# Patient Record
Sex: MALE | Race: BLACK OR AFRICAN AMERICAN | Employment: FULL TIME | URBAN - METROPOLITAN AREA
[De-identification: names, ages, dates, MRNs, and addresses within clinical notes are randomized per-mention and may not be internally consistent; named-entity substitution may affect disease eponyms.]

---

## 2017-01-09 ENCOUNTER — TELEPHONE (OUTPATIENT)
Dept: PULMONOLOGY | Age: 52
End: 2017-01-09

## 2017-01-10 ENCOUNTER — TELEPHONE (OUTPATIENT)
Dept: PULMONOLOGY | Age: 52
End: 2017-01-10

## 2017-01-17 ENCOUNTER — TELEPHONE (OUTPATIENT)
Dept: FAMILY MEDICINE CLINIC | Age: 52
End: 2017-01-17

## 2017-01-17 RX ORDER — PHENTERMINE HYDROCHLORIDE 37.5 MG/1
37.5 TABLET ORAL
Qty: 30 TABLET | Refills: 0 | Status: SHIPPED | OUTPATIENT
Start: 2017-01-17 | End: 2017-02-15 | Stop reason: SDUPTHER

## 2017-02-15 ENCOUNTER — OFFICE VISIT (OUTPATIENT)
Dept: FAMILY MEDICINE CLINIC | Age: 52
End: 2017-02-15

## 2017-02-15 VITALS
WEIGHT: 255.4 LBS | DIASTOLIC BLOOD PRESSURE: 84 MMHG | HEIGHT: 73 IN | BODY MASS INDEX: 33.85 KG/M2 | SYSTOLIC BLOOD PRESSURE: 126 MMHG | HEART RATE: 74 BPM | RESPIRATION RATE: 16 BRPM | OXYGEN SATURATION: 97 %

## 2017-02-15 DIAGNOSIS — I10 ESSENTIAL HYPERTENSION: Primary | ICD-10-CM

## 2017-02-15 DIAGNOSIS — E66.9 OBESITY (BMI 30.0-34.9): ICD-10-CM

## 2017-02-15 PROCEDURE — 99213 OFFICE O/P EST LOW 20 MIN: CPT | Performed by: FAMILY MEDICINE

## 2017-02-15 RX ORDER — PHENTERMINE HYDROCHLORIDE 37.5 MG/1
37.5 TABLET ORAL
Qty: 30 TABLET | Refills: 0 | Status: SHIPPED | OUTPATIENT
Start: 2017-02-15 | End: 2017-03-23 | Stop reason: SDUPTHER

## 2017-02-28 RX ORDER — ESCITALOPRAM OXALATE 10 MG/1
TABLET ORAL
Qty: 135 TABLET | Refills: 0 | OUTPATIENT
Start: 2017-02-28

## 2017-02-28 RX ORDER — ESCITALOPRAM OXALATE 10 MG/1
TABLET ORAL
Qty: 45 TABLET | Refills: 0 | Status: SHIPPED | OUTPATIENT
Start: 2017-02-28 | End: 2017-04-04 | Stop reason: SDUPTHER

## 2017-03-09 RX ORDER — TESTOSTERONE CYPIONATE 200 MG/ML
INJECTION INTRAMUSCULAR
Qty: 10 ML | Refills: 0 | Status: SHIPPED | OUTPATIENT
Start: 2017-03-09 | End: 2017-08-14 | Stop reason: SDUPTHER

## 2017-03-23 ENCOUNTER — OFFICE VISIT (OUTPATIENT)
Dept: FAMILY MEDICINE CLINIC | Age: 52
End: 2017-03-23

## 2017-03-23 VITALS
SYSTOLIC BLOOD PRESSURE: 130 MMHG | WEIGHT: 252 LBS | DIASTOLIC BLOOD PRESSURE: 78 MMHG | HEART RATE: 86 BPM | HEIGHT: 73 IN | BODY MASS INDEX: 33.4 KG/M2 | RESPIRATION RATE: 18 BRPM | OXYGEN SATURATION: 98 %

## 2017-03-23 DIAGNOSIS — M79.662 BILATERAL CALF PAIN: Primary | ICD-10-CM

## 2017-03-23 DIAGNOSIS — E66.9 OBESITY (BMI 30-39.9): ICD-10-CM

## 2017-03-23 DIAGNOSIS — M79.672 LEFT FOOT PAIN: ICD-10-CM

## 2017-03-23 DIAGNOSIS — M79.661 BILATERAL CALF PAIN: Primary | ICD-10-CM

## 2017-03-23 PROCEDURE — 99213 OFFICE O/P EST LOW 20 MIN: CPT | Performed by: NURSE PRACTITIONER

## 2017-03-23 RX ORDER — PHENTERMINE HYDROCHLORIDE 37.5 MG/1
37.5 TABLET ORAL
Qty: 30 TABLET | Refills: 0 | Status: SHIPPED | OUTPATIENT
Start: 2017-03-23 | End: 2017-04-22

## 2017-04-04 RX ORDER — ESCITALOPRAM OXALATE 10 MG/1
TABLET ORAL
Qty: 45 TABLET | Refills: 0 | Status: SHIPPED | OUTPATIENT
Start: 2017-04-04 | End: 2017-05-09 | Stop reason: SDUPTHER

## 2017-04-06 ENCOUNTER — OFFICE VISIT (OUTPATIENT)
Dept: FAMILY MEDICINE CLINIC | Age: 52
End: 2017-04-06

## 2017-04-06 VITALS
DIASTOLIC BLOOD PRESSURE: 72 MMHG | HEIGHT: 73 IN | WEIGHT: 250.8 LBS | RESPIRATION RATE: 16 BRPM | SYSTOLIC BLOOD PRESSURE: 130 MMHG | BODY MASS INDEX: 33.24 KG/M2 | HEART RATE: 75 BPM | OXYGEN SATURATION: 97 %

## 2017-04-06 DIAGNOSIS — E34.9 TESTOSTERONE DEFICIENCY: ICD-10-CM

## 2017-04-06 DIAGNOSIS — N64.4 NIPPLE SORENESS: Primary | ICD-10-CM

## 2017-04-06 PROCEDURE — 99213 OFFICE O/P EST LOW 20 MIN: CPT | Performed by: FAMILY MEDICINE

## 2017-04-06 ASSESSMENT — PATIENT HEALTH QUESTIONNAIRE - PHQ9
SUM OF ALL RESPONSES TO PHQ9 QUESTIONS 1 & 2: 0
1. LITTLE INTEREST OR PLEASURE IN DOING THINGS: 0
2. FEELING DOWN, DEPRESSED OR HOPELESS: 0
SUM OF ALL RESPONSES TO PHQ QUESTIONS 1-9: 0

## 2017-04-24 ENCOUNTER — OFFICE VISIT (OUTPATIENT)
Dept: FAMILY MEDICINE CLINIC | Age: 52
End: 2017-04-24

## 2017-04-24 VITALS
HEART RATE: 81 BPM | DIASTOLIC BLOOD PRESSURE: 72 MMHG | WEIGHT: 247 LBS | BODY MASS INDEX: 32.74 KG/M2 | SYSTOLIC BLOOD PRESSURE: 126 MMHG | OXYGEN SATURATION: 98 % | HEIGHT: 73 IN | RESPIRATION RATE: 14 BRPM

## 2017-04-24 DIAGNOSIS — E55.9 VITAMIN D DEFICIENCY: ICD-10-CM

## 2017-04-24 DIAGNOSIS — K21.9 GASTROESOPHAGEAL REFLUX DISEASE, ESOPHAGITIS PRESENCE NOT SPECIFIED: ICD-10-CM

## 2017-04-24 DIAGNOSIS — N64.4 BREAST PAIN IN MALE: ICD-10-CM

## 2017-04-24 DIAGNOSIS — R74.8 ELEVATED LIVER ENZYMES: ICD-10-CM

## 2017-04-24 DIAGNOSIS — M79.672 LEFT FOOT PAIN: ICD-10-CM

## 2017-04-24 DIAGNOSIS — E34.9 TESTOSTERONE DEFICIENCY: ICD-10-CM

## 2017-04-24 DIAGNOSIS — E66.9 OBESITY (BMI 30.0-34.9): ICD-10-CM

## 2017-04-24 DIAGNOSIS — I10 ESSENTIAL HYPERTENSION: Primary | ICD-10-CM

## 2017-04-24 LAB
A/G RATIO: 1.9 (ref 1.1–2.2)
ALBUMIN SERPL-MCNC: 4.7 G/DL (ref 3.4–5)
ALP BLD-CCNC: 73 U/L (ref 40–129)
ALT SERPL-CCNC: 34 U/L (ref 10–40)
ANION GAP SERPL CALCULATED.3IONS-SCNC: 14 MMOL/L (ref 3–16)
AST SERPL-CCNC: 27 U/L (ref 15–37)
BILIRUB SERPL-MCNC: 1.4 MG/DL (ref 0–1)
BUN BLDV-MCNC: 16 MG/DL (ref 7–20)
CALCIUM SERPL-MCNC: 9.2 MG/DL (ref 8.3–10.6)
CHLORIDE BLD-SCNC: 102 MMOL/L (ref 99–110)
CO2: 26 MMOL/L (ref 21–32)
CREAT SERPL-MCNC: 1 MG/DL (ref 0.9–1.3)
GFR AFRICAN AMERICAN: >60
GFR NON-AFRICAN AMERICAN: >60
GLOBULIN: 2.5 G/DL
GLUCOSE BLD-MCNC: 98 MG/DL (ref 70–99)
HEPATITIS C ANTIBODY INTERPRETATION: NORMAL
POTASSIUM SERPL-SCNC: 4.9 MMOL/L (ref 3.5–5.1)
PROSTATE SPECIFIC ANTIGEN: 0.58 NG/ML (ref 0–4)
SODIUM BLD-SCNC: 142 MMOL/L (ref 136–145)
TOTAL PROTEIN: 7.2 G/DL (ref 6.4–8.2)
VITAMIN D 25-HYDROXY: 38 NG/ML

## 2017-04-24 PROCEDURE — 99214 OFFICE O/P EST MOD 30 MIN: CPT | Performed by: FAMILY MEDICINE

## 2017-04-24 PROCEDURE — 36415 COLL VENOUS BLD VENIPUNCTURE: CPT | Performed by: FAMILY MEDICINE

## 2017-04-24 RX ORDER — LISINOPRIL 40 MG/1
TABLET ORAL
Qty: 30 TABLET | Refills: 5 | Status: SHIPPED | OUTPATIENT
Start: 2017-04-24 | End: 2017-07-11 | Stop reason: SDUPTHER

## 2017-04-26 LAB
SEX HORMONE BINDING GLOBULIN: 25 NMOL/L (ref 11–80)
TESTOSTERONE FREE PERCENT: 2.1 % (ref 1.6–2.9)
TESTOSTERONE FREE, CALC: 95 PG/ML (ref 47–244)
TESTOSTERONE TOTAL-MALE: 444 NG/DL (ref 300–890)

## 2017-05-10 RX ORDER — ESCITALOPRAM OXALATE 10 MG/1
TABLET ORAL
Qty: 45 TABLET | Refills: 0 | Status: SHIPPED | OUTPATIENT
Start: 2017-05-10 | End: 2017-06-05 | Stop reason: SDUPTHER

## 2017-06-12 RX ORDER — PRAVASTATIN SODIUM 40 MG
TABLET ORAL
Qty: 30 TABLET | Refills: 0 | Status: SHIPPED | OUTPATIENT
Start: 2017-06-12 | End: 2017-07-14

## 2017-07-11 ENCOUNTER — OFFICE VISIT (OUTPATIENT)
Dept: FAMILY MEDICINE CLINIC | Age: 52
End: 2017-07-11

## 2017-07-11 VITALS
HEIGHT: 73 IN | OXYGEN SATURATION: 98 % | WEIGHT: 256 LBS | SYSTOLIC BLOOD PRESSURE: 132 MMHG | RESPIRATION RATE: 10 BRPM | BODY MASS INDEX: 33.93 KG/M2 | HEART RATE: 72 BPM | DIASTOLIC BLOOD PRESSURE: 76 MMHG

## 2017-07-11 DIAGNOSIS — Q61.02 MULTIPLE RENAL CYSTS: ICD-10-CM

## 2017-07-11 DIAGNOSIS — J02.9 PHARYNGITIS, UNSPECIFIED ETIOLOGY: ICD-10-CM

## 2017-07-11 DIAGNOSIS — R11.0 NAUSEA: ICD-10-CM

## 2017-07-11 DIAGNOSIS — J31.0 CHRONIC RHINITIS: ICD-10-CM

## 2017-07-11 DIAGNOSIS — R42 VERTIGO: ICD-10-CM

## 2017-07-11 DIAGNOSIS — R10.11 RUQ ABDOMINAL PAIN: Primary | ICD-10-CM

## 2017-07-11 DIAGNOSIS — I10 ESSENTIAL HYPERTENSION: ICD-10-CM

## 2017-07-11 DIAGNOSIS — F51.01 PRIMARY INSOMNIA: ICD-10-CM

## 2017-07-11 LAB
BASOPHILS ABSOLUTE: 0.1 K/UL (ref 0–0.2)
BASOPHILS RELATIVE PERCENT: 1.1 %
EOSINOPHILS ABSOLUTE: 0.1 K/UL (ref 0–0.6)
EOSINOPHILS RELATIVE PERCENT: 2.3 %
HCT VFR BLD CALC: 51.6 % (ref 40.5–52.5)
HEMOGLOBIN: 17.5 G/DL (ref 13.5–17.5)
LYMPHOCYTES ABSOLUTE: 1.1 K/UL (ref 1–5.1)
LYMPHOCYTES RELATIVE PERCENT: 19.9 %
MCH RBC QN AUTO: 32.2 PG (ref 26–34)
MCHC RBC AUTO-ENTMCNC: 34 G/DL (ref 31–36)
MCV RBC AUTO: 94.8 FL (ref 80–100)
MONOCYTES ABSOLUTE: 0.8 K/UL (ref 0–1.3)
MONOCYTES RELATIVE PERCENT: 14 %
NEUTROPHILS ABSOLUTE: 3.4 K/UL (ref 1.7–7.7)
NEUTROPHILS RELATIVE PERCENT: 62.7 %
PDW BLD-RTO: 14.8 % (ref 12.4–15.4)
PLATELET # BLD: 234 K/UL (ref 135–450)
PMV BLD AUTO: 8.4 FL (ref 5–10.5)
RBC # BLD: 5.44 M/UL (ref 4.2–5.9)
WBC # BLD: 5.4 K/UL (ref 4–11)

## 2017-07-11 PROCEDURE — 36415 COLL VENOUS BLD VENIPUNCTURE: CPT | Performed by: FAMILY MEDICINE

## 2017-07-11 PROCEDURE — 99214 OFFICE O/P EST MOD 30 MIN: CPT | Performed by: FAMILY MEDICINE

## 2017-07-11 RX ORDER — ZOLPIDEM TARTRATE 10 MG/1
10 TABLET ORAL NIGHTLY PRN
Qty: 30 TABLET | Refills: 5 | Status: SHIPPED | OUTPATIENT
Start: 2017-07-11 | End: 2018-01-24 | Stop reason: SDUPTHER

## 2017-07-11 RX ORDER — LISINOPRIL 40 MG/1
TABLET ORAL
Qty: 30 TABLET | Refills: 5 | Status: SHIPPED | OUTPATIENT
Start: 2017-07-11 | End: 2017-09-13

## 2017-07-11 RX ORDER — MONTELUKAST SODIUM 10 MG/1
TABLET ORAL
Qty: 30 TABLET | Refills: 5 | Status: SHIPPED | OUTPATIENT
Start: 2017-07-11 | End: 2017-12-14 | Stop reason: SDUPTHER

## 2017-07-12 LAB
A/G RATIO: 1.5 (ref 1.1–2.2)
ALBUMIN SERPL-MCNC: 4.8 G/DL (ref 3.4–5)
ALP BLD-CCNC: 77 U/L (ref 40–129)
ALT SERPL-CCNC: 37 U/L (ref 10–40)
ANION GAP SERPL CALCULATED.3IONS-SCNC: 19 MMOL/L (ref 3–16)
AST SERPL-CCNC: 32 U/L (ref 15–37)
BILIRUB SERPL-MCNC: 1.4 MG/DL (ref 0–1)
BUN BLDV-MCNC: 14 MG/DL (ref 7–20)
CALCIUM SERPL-MCNC: 9.8 MG/DL (ref 8.3–10.6)
CHLORIDE BLD-SCNC: 99 MMOL/L (ref 99–110)
CO2: 23 MMOL/L (ref 21–32)
CREAT SERPL-MCNC: 1.1 MG/DL (ref 0.9–1.3)
GFR AFRICAN AMERICAN: >60
GFR NON-AFRICAN AMERICAN: >60
GLOBULIN: 3.1 G/DL
GLUCOSE BLD-MCNC: 90 MG/DL (ref 70–99)
LIPASE: 43 U/L (ref 13–60)
POTASSIUM SERPL-SCNC: 4.3 MMOL/L (ref 3.5–5.1)
SODIUM BLD-SCNC: 141 MMOL/L (ref 136–145)
TOTAL PROTEIN: 7.9 G/DL (ref 6.4–8.2)

## 2017-07-13 ENCOUNTER — HOSPITAL ENCOUNTER (OUTPATIENT)
Dept: ULTRASOUND IMAGING | Age: 52
Discharge: HOME | End: 2017-07-13
Attending: FAMILY MEDICINE | Admitting: FAMILY MEDICINE

## 2017-07-13 DIAGNOSIS — R11.0 NAUSEA: ICD-10-CM

## 2017-07-13 DIAGNOSIS — Q61.02 MULTIPLE RENAL CYSTS: ICD-10-CM

## 2017-07-13 DIAGNOSIS — R10.11 RIGHT UPPER QUADRANT PAIN: ICD-10-CM

## 2017-07-13 DIAGNOSIS — R10.11 RUQ ABDOMINAL PAIN: ICD-10-CM

## 2017-07-14 ENCOUNTER — PATIENT MESSAGE (OUTPATIENT)
Dept: FAMILY MEDICINE CLINIC | Age: 52
End: 2017-07-14

## 2017-07-14 DIAGNOSIS — E78.00 HYPERCHOLESTEREMIA: Primary | ICD-10-CM

## 2017-07-14 DIAGNOSIS — I10 ESSENTIAL HYPERTENSION: ICD-10-CM

## 2017-07-14 RX ORDER — ROSUVASTATIN CALCIUM 10 MG/1
10 TABLET, COATED ORAL DAILY
Qty: 90 TABLET | Refills: 0 | Status: SHIPPED | OUTPATIENT
Start: 2017-07-14 | End: 2018-01-01 | Stop reason: SDUPTHER

## 2017-08-14 RX ORDER — TESTOSTERONE CYPIONATE 200 MG/ML
INJECTION INTRAMUSCULAR
Qty: 10 ML | Refills: 0 | Status: SHIPPED | OUTPATIENT
Start: 2017-08-14 | End: 2018-08-09 | Stop reason: SDUPTHER

## 2017-09-13 ENCOUNTER — PATIENT MESSAGE (OUTPATIENT)
Dept: FAMILY MEDICINE CLINIC | Age: 52
End: 2017-09-13

## 2017-09-13 RX ORDER — VALSARTAN 160 MG/1
160 TABLET ORAL DAILY
Qty: 30 TABLET | Refills: 2 | Status: SHIPPED | OUTPATIENT
Start: 2017-09-13 | End: 2017-12-14 | Stop reason: SDUPTHER

## 2017-09-18 DIAGNOSIS — J31.0 CHRONIC RHINITIS: ICD-10-CM

## 2017-09-20 RX ORDER — NAPROXEN 500 MG/1
TABLET ORAL
Qty: 60 TABLET | Refills: 5 | Status: SHIPPED | OUTPATIENT
Start: 2017-09-20 | End: 2018-07-24

## 2017-09-20 RX ORDER — ESOMEPRAZOLE MAGNESIUM 40 MG/1
CAPSULE, DELAYED RELEASE ORAL
Qty: 30 CAPSULE | Refills: 5 | Status: SHIPPED | OUTPATIENT
Start: 2017-09-20 | End: 2018-02-08 | Stop reason: SDUPTHER

## 2017-10-04 RX ORDER — AMLODIPINE BESYLATE 5 MG/1
TABLET ORAL
Qty: 30 TABLET | Refills: 5 | Status: SHIPPED | OUTPATIENT
Start: 2017-10-04 | End: 2018-01-24 | Stop reason: SDUPTHER

## 2017-11-20 ENCOUNTER — OFFICE VISIT (OUTPATIENT)
Dept: FAMILY MEDICINE CLINIC | Age: 52
End: 2017-11-20

## 2017-11-20 VITALS
HEIGHT: 73 IN | RESPIRATION RATE: 16 BRPM | HEART RATE: 82 BPM | DIASTOLIC BLOOD PRESSURE: 82 MMHG | WEIGHT: 267.8 LBS | BODY MASS INDEX: 35.49 KG/M2 | SYSTOLIC BLOOD PRESSURE: 134 MMHG

## 2017-11-20 DIAGNOSIS — J31.0 OTHER CHRONIC RHINITIS: ICD-10-CM

## 2017-11-20 DIAGNOSIS — I10 ESSENTIAL HYPERTENSION: Primary | ICD-10-CM

## 2017-11-20 PROCEDURE — 99213 OFFICE O/P EST LOW 20 MIN: CPT | Performed by: NURSE PRACTITIONER

## 2017-11-20 RX ORDER — PHENTERMINE HYDROCHLORIDE 37.5 MG/1
37.5 TABLET ORAL
Qty: 30 TABLET | Refills: 0 | Status: SHIPPED | OUTPATIENT
Start: 2017-11-20 | End: 2017-12-14 | Stop reason: SDUPTHER

## 2017-11-24 ENCOUNTER — TELEPHONE (OUTPATIENT)
Dept: FAMILY MEDICINE CLINIC | Age: 52
End: 2017-11-24

## 2017-11-24 RX ORDER — FLUTICASONE PROPIONATE 50 MCG
2 SPRAY, SUSPENSION (ML) NASAL DAILY
Qty: 1 BOTTLE | Refills: 11 | Status: SHIPPED | OUTPATIENT
Start: 2017-11-24 | End: 2017-12-14 | Stop reason: ALTCHOICE

## 2017-11-24 NOTE — TELEPHONE ENCOUNTER
UNABLE TO LEAVE [facility] FOR PT. MISAEL CHANGED VERAMYST NASAL SPRAY TO FLUTICASONE DUE TO VERAMYST BEING UNAVAILABLE.

## 2017-12-09 ENCOUNTER — PATIENT MESSAGE (OUTPATIENT)
Dept: FAMILY MEDICINE CLINIC | Age: 52
End: 2017-12-09

## 2017-12-09 DIAGNOSIS — E78.00 HYPERCHOLESTEREMIA: Primary | ICD-10-CM

## 2017-12-09 DIAGNOSIS — Z13.220 NEED FOR LIPID SCREENING: ICD-10-CM

## 2017-12-11 NOTE — TELEPHONE ENCOUNTER
From: Hesham White  To: Benigno Jiang MD  Sent: 12/9/2017 9:32 PM EST  Subject: Non-Urgent Medical Question    I have not a lipid panel this year and need it for my job. I need to get done with results before dec 14. Can I have a standing order to go another [facility] lab? Drawing it on my 12-14 appt may be too late. Thank you.

## 2017-12-12 DIAGNOSIS — E78.00 HYPERCHOLESTEREMIA: ICD-10-CM

## 2017-12-12 DIAGNOSIS — I10 ESSENTIAL HYPERTENSION: ICD-10-CM

## 2017-12-12 LAB
A/G RATIO: 2 (ref 1.1–2.2)
ALBUMIN SERPL-MCNC: 4.9 G/DL (ref 3.4–5)
ALP BLD-CCNC: 74 U/L (ref 40–129)
ALT SERPL-CCNC: 38 U/L (ref 10–40)
ANION GAP SERPL CALCULATED.3IONS-SCNC: 15 MMOL/L (ref 3–16)
AST SERPL-CCNC: 27 U/L (ref 15–37)
BILIRUB SERPL-MCNC: 1.1 MG/DL (ref 0–1)
BUN BLDV-MCNC: 17 MG/DL (ref 7–20)
CALCIUM SERPL-MCNC: 9.6 MG/DL (ref 8.3–10.6)
CHLORIDE BLD-SCNC: 102 MMOL/L (ref 99–110)
CHOLESTEROL, TOTAL: 142 MG/DL (ref 0–199)
CO2: 25 MMOL/L (ref 21–32)
CREAT SERPL-MCNC: 1.3 MG/DL (ref 0.9–1.3)
GFR AFRICAN AMERICAN: >60
GFR NON-AFRICAN AMERICAN: 58
GLOBULIN: 2.5 G/DL
GLUCOSE BLD-MCNC: 85 MG/DL (ref 70–99)
HDLC SERPL-MCNC: 48 MG/DL (ref 40–60)
LDL CHOLESTEROL CALCULATED: 72 MG/DL
POTASSIUM SERPL-SCNC: 4.3 MMOL/L (ref 3.5–5.1)
SODIUM BLD-SCNC: 142 MMOL/L (ref 136–145)
TOTAL PROTEIN: 7.4 G/DL (ref 6.4–8.2)
TRIGL SERPL-MCNC: 112 MG/DL (ref 0–150)
VLDLC SERPL CALC-MCNC: 22 MG/DL

## 2017-12-13 RX ORDER — ESCITALOPRAM OXALATE 10 MG/1
TABLET ORAL
Qty: 45 TABLET | Refills: 0 | Status: SHIPPED | OUTPATIENT
Start: 2017-12-13 | End: 2017-12-14 | Stop reason: SDUPTHER

## 2017-12-14 ENCOUNTER — OFFICE VISIT (OUTPATIENT)
Dept: FAMILY MEDICINE CLINIC | Age: 52
End: 2017-12-14

## 2017-12-14 VITALS
DIASTOLIC BLOOD PRESSURE: 74 MMHG | RESPIRATION RATE: 16 BRPM | HEART RATE: 80 BPM | HEIGHT: 73 IN | BODY MASS INDEX: 33.88 KG/M2 | WEIGHT: 255.6 LBS | SYSTOLIC BLOOD PRESSURE: 128 MMHG

## 2017-12-14 DIAGNOSIS — E66.09 CLASS 1 OBESITY DUE TO EXCESS CALORIES WITH SERIOUS COMORBIDITY AND BODY MASS INDEX (BMI) OF 33.0 TO 33.9 IN ADULT: ICD-10-CM

## 2017-12-14 DIAGNOSIS — J31.0 OTHER CHRONIC RHINITIS: ICD-10-CM

## 2017-12-14 DIAGNOSIS — I10 ESSENTIAL HYPERTENSION: Primary | ICD-10-CM

## 2017-12-14 PROCEDURE — 99213 OFFICE O/P EST LOW 20 MIN: CPT | Performed by: NURSE PRACTITIONER

## 2017-12-14 RX ORDER — PHENTERMINE HYDROCHLORIDE 37.5 MG/1
37.5 TABLET ORAL
Qty: 30 TABLET | Refills: 0 | Status: SHIPPED | OUTPATIENT
Start: 2017-12-14 | End: 2018-01-24 | Stop reason: SDUPTHER

## 2017-12-14 RX ORDER — VALSARTAN 160 MG/1
160 TABLET ORAL DAILY
Qty: 30 TABLET | Refills: 5 | Status: SHIPPED | OUTPATIENT
Start: 2017-12-14 | End: 2018-02-08 | Stop reason: SDUPTHER

## 2017-12-14 RX ORDER — MONTELUKAST SODIUM 10 MG/1
TABLET ORAL
Qty: 30 TABLET | Refills: 5 | Status: SHIPPED | OUTPATIENT
Start: 2017-12-14 | End: 2018-02-08 | Stop reason: SDUPTHER

## 2017-12-14 RX ORDER — ESCITALOPRAM OXALATE 10 MG/1
TABLET ORAL
Qty: 45 TABLET | Refills: 5 | Status: SHIPPED | OUTPATIENT
Start: 2017-12-14 | End: 2018-03-16 | Stop reason: SDUPTHER

## 2017-12-14 NOTE — PROGRESS NOTES
Zoraida Irving  54 y.o. male    1965      CC: Weight loss follow up. Chief Complaint   Patient presents with    Weight Loss     ROUTINE WEIGHT LOSS FOLLOW UP      HPI  End of month 1 on adipex. Down 12 lbs on the medication. Has been doing well on the medication. BP running good. Has been working out, but sporatic related to schedule. Exercise is about 3 days weekly - was 5-6 previously. Does the treadmill mainly   Some [employer] machine. Side effects - none - takes it prior to work or in am on his off days. (works 3rd shift. When took in ams, would have trouble sleeping. That way he gets the best effect from the medication while he is up and eating. Diet - Has been eating less - twice daily with reduced appetite. Schedule dictates, as well. Eats prior to work and at 3 am at work. Has cut down on caffeine. Has cut down on coffee. Soda intake has cut down. Has stopped energy drinks as well. Urine is delayed slightly on the med. Was coughing with lisinopril and had steroid pills for 10 d. Helped. Then had cough again. Noted that it was from the med - tried on and off the medication. Has a cough at times with eating or drinking things. There is a delayed reaction with this swallow. Allergies   Allergen Reactions    Iodine Other (See Comments)     IVP dye reaction. Could not stop sneezing. Used a second time, and took benadryl prior and no issues.  Lisinopril Other (See Comments)     Cough           Physical Exam      Constitutional: Oriented to person, place, and time and well-developed, well-nourished, and in no distress. No distress. Neck: Carotid bruit is not present. Cardiovascular: Normal rate, regular rhythm and normal heart sounds. Exam reveals no gallop and no friction rub. No murmur heard. Pulmonary/Chest: Effort normal and breath sounds normal. No respiratory distress. He has no wheezes. No rales. Exhibits no tenderness.    Musculoskeletal: Exhibits no edema. Neurological: Alert and oriented to person, place, and time. Skin: Skin is warm and dry. No diaphoresis  Psychiatric: Mood, memory, affect and judgment normal.   Nursing note and vitals reviewed. Vitals:    12/14/17 1121   BP: 128/74   Site: Left Arm   Position: Sitting   Cuff Size: Large Adult   Pulse: 80   Resp: 16   Weight: 255 lb 9.6 oz (115.9 kg)   Height: 6' 1\" (1.854 m)     Body mass index is 33.72 kg/m². Wt Readings from Last 3 Encounters:   12/14/17 255 lb 9.6 oz (115.9 kg)   11/20/17 267 lb 12.8 oz (121.5 kg)   07/11/17 256 lb (116.1 kg)     BP Readings from Last 3 Encounters:   12/14/17 128/74   11/20/17 134/82   07/11/17 132/76        No results found for this visit on 12/14/17. Assessment    1. Essential hypertension  phentermine (ADIPEX-P) 37.5 MG tablet   2. Other chronic rhinitis     3. Class 1 obesity due to excess calories with serious comorbidity and body mass index (BMI) of 33.0 to 33.9 in adult  phentermine (ADIPEX-P) 37.5 MG tablet         Plan    Fill of month 2 adipex given. Continue diet and exercise. Congratulated on Kassie morillo. Return in about 1 month (around 1/14/2018) for Weight Management. There are no Patient Instructions on file for this visit.

## 2018-01-02 RX ORDER — ROSUVASTATIN CALCIUM 10 MG/1
10 TABLET, COATED ORAL DAILY
Qty: 90 TABLET | Refills: 0 | Status: SHIPPED | OUTPATIENT
Start: 2018-01-02 | End: 2018-02-08 | Stop reason: SDUPTHER

## 2018-01-24 ENCOUNTER — OFFICE VISIT (OUTPATIENT)
Dept: FAMILY MEDICINE CLINIC | Age: 53
End: 2018-01-24

## 2018-01-24 VITALS
WEIGHT: 252.8 LBS | BODY MASS INDEX: 33.5 KG/M2 | SYSTOLIC BLOOD PRESSURE: 128 MMHG | DIASTOLIC BLOOD PRESSURE: 74 MMHG | HEIGHT: 73 IN

## 2018-01-24 DIAGNOSIS — E66.09 CLASS 1 OBESITY DUE TO EXCESS CALORIES WITH SERIOUS COMORBIDITY AND BODY MASS INDEX (BMI) OF 33.0 TO 33.9 IN ADULT: ICD-10-CM

## 2018-01-24 DIAGNOSIS — I10 ESSENTIAL HYPERTENSION: ICD-10-CM

## 2018-01-24 DIAGNOSIS — N41.0 ACUTE PROSTATITIS: ICD-10-CM

## 2018-01-24 DIAGNOSIS — Z12.5 SCREENING FOR PROSTATE CANCER: Primary | ICD-10-CM

## 2018-01-24 LAB
BILIRUBIN, POC: ABNORMAL
BLOOD URINE, POC: ABNORMAL
CLARITY, POC: CLEAR
COLOR, POC: ABNORMAL
GLUCOSE URINE, POC: ABNORMAL
KETONES, POC: ABNORMAL
LEUKOCYTE EST, POC: ABNORMAL
NITRITE, POC: ABNORMAL
PH, POC: 6.5
PROSTATE SPECIFIC ANTIGEN: 8.47 NG/ML (ref 0–4)
PROTEIN, POC: ABNORMAL
SPECIFIC GRAVITY, POC: 1.02
UROBILINOGEN, POC: 0.2

## 2018-01-24 PROCEDURE — 81002 URINALYSIS NONAUTO W/O SCOPE: CPT | Performed by: NURSE PRACTITIONER

## 2018-01-24 PROCEDURE — 99214 OFFICE O/P EST MOD 30 MIN: CPT | Performed by: NURSE PRACTITIONER

## 2018-01-24 RX ORDER — ZOLPIDEM TARTRATE 10 MG/1
10 TABLET ORAL NIGHTLY PRN
Qty: 30 TABLET | Refills: 2 | Status: SHIPPED | OUTPATIENT
Start: 2018-01-24 | End: 2018-02-23

## 2018-01-24 RX ORDER — CIPROFLOXACIN 500 MG/1
500 TABLET, FILM COATED ORAL 2 TIMES DAILY
Qty: 60 TABLET | Refills: 0 | Status: SHIPPED | OUTPATIENT
Start: 2018-01-24 | End: 2018-02-23

## 2018-01-24 RX ORDER — PHENTERMINE HYDROCHLORIDE 37.5 MG/1
37.5 TABLET ORAL
Qty: 30 TABLET | Refills: 0 | Status: SHIPPED | OUTPATIENT
Start: 2018-01-24 | End: 2018-02-23

## 2018-01-24 RX ORDER — AMLODIPINE BESYLATE 5 MG/1
TABLET ORAL
Qty: 30 TABLET | Refills: 5 | Status: SHIPPED | OUTPATIENT
Start: 2018-01-24 | End: 2018-07-10 | Stop reason: SDUPTHER

## 2018-01-25 ENCOUNTER — PATIENT MESSAGE (OUTPATIENT)
Dept: FAMILY MEDICINE CLINIC | Age: 53
End: 2018-01-25

## 2018-02-09 RX ORDER — ROSUVASTATIN CALCIUM 10 MG/1
10 TABLET, COATED ORAL DAILY
Qty: 90 TABLET | Refills: 0 | Status: SHIPPED | OUTPATIENT
Start: 2018-02-09 | End: 2018-11-08 | Stop reason: SDUPTHER

## 2018-02-09 RX ORDER — MONTELUKAST SODIUM 10 MG/1
TABLET ORAL
Qty: 30 TABLET | Refills: 2 | Status: SHIPPED | OUTPATIENT
Start: 2018-02-09 | End: 2018-04-16 | Stop reason: SDUPTHER

## 2018-02-09 RX ORDER — VALSARTAN 160 MG/1
160 TABLET ORAL DAILY
Qty: 30 TABLET | Refills: 2 | Status: SHIPPED | OUTPATIENT
Start: 2018-02-09 | End: 2018-04-16 | Stop reason: SDUPTHER

## 2018-02-09 RX ORDER — ESOMEPRAZOLE MAGNESIUM 40 MG/1
CAPSULE, DELAYED RELEASE ORAL
Qty: 30 CAPSULE | Refills: 2 | Status: SHIPPED | OUTPATIENT
Start: 2018-02-09 | End: 2018-04-16 | Stop reason: SDUPTHER

## 2018-03-16 ENCOUNTER — OFFICE VISIT (OUTPATIENT)
Dept: FAMILY MEDICINE CLINIC | Age: 53
End: 2018-03-16

## 2018-03-16 VITALS
SYSTOLIC BLOOD PRESSURE: 160 MMHG | HEART RATE: 84 BPM | DIASTOLIC BLOOD PRESSURE: 100 MMHG | WEIGHT: 256 LBS | HEIGHT: 73 IN | BODY MASS INDEX: 33.93 KG/M2

## 2018-03-16 DIAGNOSIS — N40.1 BENIGN PROSTATIC HYPERPLASIA WITH URINARY HESITANCY: ICD-10-CM

## 2018-03-16 DIAGNOSIS — K63.5 POLYP OF COLON, UNSPECIFIED PART OF COLON, UNSPECIFIED TYPE: ICD-10-CM

## 2018-03-16 DIAGNOSIS — R97.20 ELEVATED PSA: ICD-10-CM

## 2018-03-16 DIAGNOSIS — R35.0 URINARY FREQUENCY: ICD-10-CM

## 2018-03-16 DIAGNOSIS — R39.11 BENIGN PROSTATIC HYPERPLASIA WITH URINARY HESITANCY: ICD-10-CM

## 2018-03-16 DIAGNOSIS — L30.9 DERMATITIS: ICD-10-CM

## 2018-03-16 DIAGNOSIS — N41.0 ACUTE PROSTATITIS: Primary | ICD-10-CM

## 2018-03-16 DIAGNOSIS — R79.89 LOW TESTOSTERONE: ICD-10-CM

## 2018-03-16 DIAGNOSIS — I10 ESSENTIAL HYPERTENSION: ICD-10-CM

## 2018-03-16 PROCEDURE — 99214 OFFICE O/P EST MOD 30 MIN: CPT | Performed by: FAMILY MEDICINE

## 2018-03-16 RX ORDER — ESCITALOPRAM OXALATE 10 MG/1
TABLET ORAL
Qty: 45 TABLET | Refills: 5 | Status: SHIPPED | OUTPATIENT
Start: 2018-03-16 | End: 2018-04-16 | Stop reason: SDUPTHER

## 2018-03-16 RX ORDER — DOXAZOSIN 8 MG/1
4-8 TABLET ORAL NIGHTLY
Qty: 30 TABLET | Refills: 5 | Status: SHIPPED | OUTPATIENT
Start: 2018-03-16 | End: 2018-08-02 | Stop reason: SDUPTHER

## 2018-04-16 ENCOUNTER — OFFICE VISIT (OUTPATIENT)
Dept: FAMILY MEDICINE CLINIC | Age: 53
End: 2018-04-16

## 2018-04-16 VITALS
DIASTOLIC BLOOD PRESSURE: 76 MMHG | RESPIRATION RATE: 14 BRPM | OXYGEN SATURATION: 99 % | SYSTOLIC BLOOD PRESSURE: 124 MMHG | HEIGHT: 73 IN | BODY MASS INDEX: 33.4 KG/M2 | HEART RATE: 70 BPM | WEIGHT: 252 LBS

## 2018-04-16 DIAGNOSIS — I10 ESSENTIAL HYPERTENSION: ICD-10-CM

## 2018-04-16 DIAGNOSIS — D12.6 ADENOMATOUS POLYP OF COLON, UNSPECIFIED PART OF COLON: Primary | ICD-10-CM

## 2018-04-16 DIAGNOSIS — R79.89 LOW TESTOSTERONE: ICD-10-CM

## 2018-04-16 DIAGNOSIS — J31.0 OTHER CHRONIC RHINITIS: ICD-10-CM

## 2018-04-16 DIAGNOSIS — R97.20 ELEVATED PSA: ICD-10-CM

## 2018-04-16 DIAGNOSIS — N41.0 ACUTE PROSTATITIS: ICD-10-CM

## 2018-04-16 DIAGNOSIS — E78.00 HYPERCHOLESTEREMIA: ICD-10-CM

## 2018-04-16 DIAGNOSIS — E55.9 VITAMIN D DEFICIENCY: ICD-10-CM

## 2018-04-16 DIAGNOSIS — F33.41 RECURRENT MAJOR DEPRESSIVE DISORDER, IN PARTIAL REMISSION (HCC): ICD-10-CM

## 2018-04-16 DIAGNOSIS — Z13.220 NEED FOR LIPID SCREENING: ICD-10-CM

## 2018-04-16 LAB
BASOPHILS ABSOLUTE: 0 K/UL (ref 0–0.2)
BASOPHILS RELATIVE PERCENT: 0.9 %
EOSINOPHILS ABSOLUTE: 0 K/UL (ref 0–0.6)
EOSINOPHILS RELATIVE PERCENT: 1 %
HCT VFR BLD CALC: 41.6 % (ref 40.5–52.5)
HEMOGLOBIN: 14.7 G/DL (ref 13.5–17.5)
LYMPHOCYTES ABSOLUTE: 0.9 K/UL (ref 1–5.1)
LYMPHOCYTES RELATIVE PERCENT: 23 %
MCH RBC QN AUTO: 32.5 PG (ref 26–34)
MCHC RBC AUTO-ENTMCNC: 35.3 G/DL (ref 31–36)
MCV RBC AUTO: 92.2 FL (ref 80–100)
MONOCYTES ABSOLUTE: 0.5 K/UL (ref 0–1.3)
MONOCYTES RELATIVE PERCENT: 12.4 %
NEUTROPHILS ABSOLUTE: 2.5 K/UL (ref 1.7–7.7)
NEUTROPHILS RELATIVE PERCENT: 62.7 %
PDW BLD-RTO: 14.4 % (ref 12.4–15.4)
PLATELET # BLD: 236 K/UL (ref 135–450)
PMV BLD AUTO: 7.9 FL (ref 5–10.5)
RBC # BLD: 4.51 M/UL (ref 4.2–5.9)
WBC # BLD: 4 K/UL (ref 4–11)

## 2018-04-16 PROCEDURE — 99214 OFFICE O/P EST MOD 30 MIN: CPT | Performed by: FAMILY MEDICINE

## 2018-04-16 RX ORDER — ESCITALOPRAM OXALATE 20 MG/1
TABLET ORAL
Qty: 30 TABLET | Refills: 5 | Status: SHIPPED | OUTPATIENT
Start: 2018-04-16 | End: 2018-09-12 | Stop reason: SDUPTHER

## 2018-04-16 RX ORDER — ESOMEPRAZOLE MAGNESIUM 40 MG/1
CAPSULE, DELAYED RELEASE ORAL
Qty: 30 CAPSULE | Refills: 5 | Status: SHIPPED | OUTPATIENT
Start: 2018-04-16 | End: 2018-09-27 | Stop reason: SDUPTHER

## 2018-04-16 RX ORDER — MONTELUKAST SODIUM 10 MG/1
TABLET ORAL
Qty: 30 TABLET | Refills: 5 | Status: SHIPPED | OUTPATIENT
Start: 2018-04-16 | End: 2019-07-13 | Stop reason: SDUPTHER

## 2018-04-16 RX ORDER — VALSARTAN 160 MG/1
160 TABLET ORAL DAILY
Qty: 30 TABLET | Refills: 5 | Status: SHIPPED | OUTPATIENT
Start: 2018-04-16 | End: 2018-08-02 | Stop reason: ALTCHOICE

## 2018-04-16 ASSESSMENT — PATIENT HEALTH QUESTIONNAIRE - PHQ9
SUM OF ALL RESPONSES TO PHQ9 QUESTIONS 1 & 2: 0
2. FEELING DOWN, DEPRESSED OR HOPELESS: 0
1. LITTLE INTEREST OR PLEASURE IN DOING THINGS: 0
SUM OF ALL RESPONSES TO PHQ QUESTIONS 1-9: 0

## 2018-04-17 LAB
A/G RATIO: 2 (ref 1.1–2.2)
ALBUMIN SERPL-MCNC: 4.9 G/DL (ref 3.4–5)
ALP BLD-CCNC: 76 U/L (ref 40–129)
ALT SERPL-CCNC: 54 U/L (ref 10–40)
ANION GAP SERPL CALCULATED.3IONS-SCNC: 14 MMOL/L (ref 3–16)
AST SERPL-CCNC: 35 U/L (ref 15–37)
BILIRUB SERPL-MCNC: 1.4 MG/DL (ref 0–1)
BUN BLDV-MCNC: 15 MG/DL (ref 7–20)
CALCIUM SERPL-MCNC: 9.6 MG/DL (ref 8.3–10.6)
CHLORIDE BLD-SCNC: 98 MMOL/L (ref 99–110)
CO2: 27 MMOL/L (ref 21–32)
CREAT SERPL-MCNC: 1.2 MG/DL (ref 0.9–1.3)
GFR AFRICAN AMERICAN: >60
GFR NON-AFRICAN AMERICAN: >60
GLOBULIN: 2.4 G/DL
GLUCOSE BLD-MCNC: 102 MG/DL (ref 70–99)
POTASSIUM SERPL-SCNC: 4.2 MMOL/L (ref 3.5–5.1)
SODIUM BLD-SCNC: 139 MMOL/L (ref 136–145)
TOTAL PROTEIN: 7.3 G/DL (ref 6.4–8.2)
VITAMIN D 25-HYDROXY: 23.1 NG/ML

## 2018-04-18 LAB
PROSTATE SPECIFIC ANTIGEN FREE: 0.2 UG/L
PROSTATE SPECIFIC ANTIGEN PERCENT FREE: 28.6 %
PROSTATE SPECIFIC ANTIGEN: 0.7 UG/L (ref 0–4)
SEX HORMONE BINDING GLOBULIN: 28 NMOL/L (ref 11–80)
TESTOSTERONE FREE-NONMALE: 54.3 PG/ML (ref 47–244)
TESTOSTERONE TOTAL: 255 NG/DL (ref 220–1000)

## 2018-05-28 ENCOUNTER — PATIENT MESSAGE (OUTPATIENT)
Dept: FAMILY MEDICINE CLINIC | Age: 53
End: 2018-05-28

## 2018-05-29 RX ORDER — CHOLECALCIFEROL (VITAMIN D3) 125 MCG
5-10 CAPSULE ORAL NIGHTLY
Qty: 60 TABLET | Refills: 2 | Status: SHIPPED | OUTPATIENT
Start: 2018-05-29 | End: 2018-08-02 | Stop reason: SDUPTHER

## 2018-06-04 ENCOUNTER — OFFICE VISIT (OUTPATIENT)
Dept: FAMILY MEDICINE CLINIC | Age: 53
End: 2018-06-04

## 2018-06-04 VITALS
OXYGEN SATURATION: 98 % | BODY MASS INDEX: 35.76 KG/M2 | RESPIRATION RATE: 18 BRPM | TEMPERATURE: 99.5 F | HEIGHT: 73 IN | HEART RATE: 86 BPM | WEIGHT: 269.8 LBS | DIASTOLIC BLOOD PRESSURE: 82 MMHG | SYSTOLIC BLOOD PRESSURE: 152 MMHG

## 2018-06-04 DIAGNOSIS — R10.84 GENERALIZED ABDOMINAL PAIN: ICD-10-CM

## 2018-06-04 DIAGNOSIS — Z77.21 EXPOSURE TO BLOOD OR BODY FLUID: ICD-10-CM

## 2018-06-04 DIAGNOSIS — S16.1XXA STRAIN OF NECK MUSCLE, INITIAL ENCOUNTER: ICD-10-CM

## 2018-06-04 DIAGNOSIS — R39.15 URINARY URGENCY: ICD-10-CM

## 2018-06-04 DIAGNOSIS — R33.9 URINARY RETENTION: Primary | ICD-10-CM

## 2018-06-04 DIAGNOSIS — H83.03 LABYRINTHITIS OF BOTH EARS: ICD-10-CM

## 2018-06-04 DIAGNOSIS — R52 BODY ACHES: ICD-10-CM

## 2018-06-04 DIAGNOSIS — R53.83 FATIGUE, UNSPECIFIED TYPE: ICD-10-CM

## 2018-06-04 DIAGNOSIS — M79.10 MYALGIA: ICD-10-CM

## 2018-06-04 LAB
A/G RATIO: 1.7 (ref 1.1–2.2)
ALBUMIN SERPL-MCNC: 4.5 G/DL (ref 3.4–5)
ALP BLD-CCNC: 87 U/L (ref 40–129)
ALT SERPL-CCNC: 31 U/L (ref 10–40)
ANION GAP SERPL CALCULATED.3IONS-SCNC: 14 MMOL/L (ref 3–16)
AST SERPL-CCNC: 23 U/L (ref 15–37)
BASOPHILS ABSOLUTE: 0 K/UL (ref 0–0.2)
BASOPHILS RELATIVE PERCENT: 0.5 %
BILIRUB SERPL-MCNC: 1.2 MG/DL (ref 0–1)
BILIRUBIN, POC: ABNORMAL
BLOOD URINE, POC: ABNORMAL
BUN BLDV-MCNC: 10 MG/DL (ref 7–20)
C-REACTIVE PROTEIN: 56.5 MG/L (ref 0–5.1)
CALCIUM SERPL-MCNC: 9.4 MG/DL (ref 8.3–10.6)
CHLORIDE BLD-SCNC: 100 MMOL/L (ref 99–110)
CLARITY, POC: CLEAR
CO2: 28 MMOL/L (ref 21–32)
COLOR, POC: YELLOW
CREAT SERPL-MCNC: 1.2 MG/DL (ref 0.9–1.3)
EOSINOPHILS ABSOLUTE: 0 K/UL (ref 0–0.6)
EOSINOPHILS RELATIVE PERCENT: 0.5 %
GFR AFRICAN AMERICAN: >60
GFR NON-AFRICAN AMERICAN: >60
GLOBULIN: 2.7 G/DL
GLUCOSE BLD-MCNC: 117 MG/DL (ref 70–99)
GLUCOSE URINE, POC: ABNORMAL
HAV IGM SER IA-ACNC: NORMAL
HCT VFR BLD CALC: 43.1 % (ref 40.5–52.5)
HEMOGLOBIN: 15.1 G/DL (ref 13.5–17.5)
HEPATITIS B CORE IGM ANTIBODY: NORMAL
HEPATITIS B SURFACE ANTIGEN INTERPRETATION: NORMAL
HEPATITIS C ANTIBODY INTERPRETATION: NORMAL
KETONES, POC: ABNORMAL
LEUKOCYTE EST, POC: ABNORMAL
LIPASE: 28 U/L (ref 13–60)
LYMPHOCYTES ABSOLUTE: 0.5 K/UL (ref 1–5.1)
LYMPHOCYTES RELATIVE PERCENT: 9.2 %
MCH RBC QN AUTO: 32.5 PG (ref 26–34)
MCHC RBC AUTO-ENTMCNC: 35 G/DL (ref 31–36)
MCV RBC AUTO: 92.9 FL (ref 80–100)
MONOCYTES ABSOLUTE: 1.1 K/UL (ref 0–1.3)
MONOCYTES RELATIVE PERCENT: 18.1 %
NEUTROPHILS ABSOLUTE: 4.3 K/UL (ref 1.7–7.7)
NEUTROPHILS RELATIVE PERCENT: 71.7 %
NITRITE, POC: ABNORMAL
PDW BLD-RTO: 14.2 % (ref 12.4–15.4)
PH, POC: 7
PLATELET # BLD: 210 K/UL (ref 135–450)
PMV BLD AUTO: 8 FL (ref 5–10.5)
POTASSIUM SERPL-SCNC: 4.3 MMOL/L (ref 3.5–5.1)
PROTEIN, POC: ABNORMAL
RBC # BLD: 4.64 M/UL (ref 4.2–5.9)
SEDIMENTATION RATE, ERYTHROCYTE: 32 MM/HR (ref 0–20)
SODIUM BLD-SCNC: 142 MMOL/L (ref 136–145)
SPECIFIC GRAVITY, POC: 1.02
TOTAL CK: 177 U/L (ref 39–308)
TOTAL PROTEIN: 7.2 G/DL (ref 6.4–8.2)
TSH SERPL DL<=0.05 MIU/L-ACNC: 2.72 UIU/ML (ref 0.27–4.2)
UROBILINOGEN, POC: 0.2
WBC # BLD: 6 K/UL (ref 4–11)

## 2018-06-04 PROCEDURE — 81002 URINALYSIS NONAUTO W/O SCOPE: CPT | Performed by: NURSE PRACTITIONER

## 2018-06-04 PROCEDURE — 99214 OFFICE O/P EST MOD 30 MIN: CPT | Performed by: NURSE PRACTITIONER

## 2018-06-04 RX ORDER — PREDNISONE 10 MG/1
TABLET ORAL
Qty: 36 TABLET | Refills: 0 | Status: SHIPPED | OUTPATIENT
Start: 2018-06-04 | End: 2018-07-05 | Stop reason: SDUPTHER

## 2018-06-05 LAB
HIV AG/AB: NORMAL
HIV ANTIGEN: NORMAL
HIV-1 ANTIBODY: NORMAL
HIV-2 AB: NORMAL

## 2018-06-06 ENCOUNTER — PATIENT MESSAGE (OUTPATIENT)
Dept: FAMILY MEDICINE CLINIC | Age: 53
End: 2018-06-06

## 2018-06-06 DIAGNOSIS — R79.82 ELEVATED C-REACTIVE PROTEIN (CRP): Primary | ICD-10-CM

## 2018-06-06 DIAGNOSIS — M25.50 ARTHRALGIA, UNSPECIFIED JOINT: ICD-10-CM

## 2018-06-06 LAB
PROSTATE SPECIFIC ANTIGEN FREE: 0.1 UG/L
PROSTATE SPECIFIC ANTIGEN PERCENT FREE: 20 %
PROSTATE SPECIFIC ANTIGEN: 0.5 UG/L (ref 0–4)

## 2018-06-07 DIAGNOSIS — R79.82 ELEVATED C-REACTIVE PROTEIN (CRP): ICD-10-CM

## 2018-06-07 DIAGNOSIS — M25.50 ARTHRALGIA, UNSPECIFIED JOINT: ICD-10-CM

## 2018-06-07 LAB — RHEUMATOID FACTOR: <10 IU/ML

## 2018-06-08 ENCOUNTER — TELEPHONE (OUTPATIENT)
Dept: FAMILY MEDICINE CLINIC | Age: 53
End: 2018-06-08

## 2018-06-08 LAB
ANA INTERPRETATION: NORMAL
ANTI-NUCLEAR ANTIBODY (ANA): NEGATIVE

## 2018-06-09 LAB — CCP IGG ANTIBODIES: 7 UNITS (ref 0–19)

## 2018-06-25 RX ORDER — DOXAZOSIN 8 MG/1
4-8 TABLET ORAL NIGHTLY
Qty: 30 TABLET | Refills: 5 | OUTPATIENT
Start: 2018-06-25

## 2018-06-28 ENCOUNTER — INITIAL CONSULT (OUTPATIENT)
Dept: SURGERY | Age: 53
End: 2018-06-28

## 2018-06-28 VITALS
HEIGHT: 73 IN | DIASTOLIC BLOOD PRESSURE: 95 MMHG | HEART RATE: 67 BPM | SYSTOLIC BLOOD PRESSURE: 157 MMHG | WEIGHT: 271 LBS | BODY MASS INDEX: 35.92 KG/M2

## 2018-06-28 DIAGNOSIS — D17.1 LIPOMA OF ANTERIOR CHEST WALL: Primary | ICD-10-CM

## 2018-06-28 PROCEDURE — 99203 OFFICE O/P NEW LOW 30 MIN: CPT | Performed by: SURGERY

## 2018-06-28 ASSESSMENT — ENCOUNTER SYMPTOMS
GASTROINTESTINAL NEGATIVE: 1
RESPIRATORY NEGATIVE: 1

## 2018-07-03 ENCOUNTER — PATIENT MESSAGE (OUTPATIENT)
Dept: FAMILY MEDICINE CLINIC | Age: 53
End: 2018-07-03

## 2018-07-03 DIAGNOSIS — H83.03 LABYRINTHITIS OF BOTH EARS: ICD-10-CM

## 2018-07-03 DIAGNOSIS — S16.1XXA STRAIN OF NECK MUSCLE, INITIAL ENCOUNTER: ICD-10-CM

## 2018-07-05 RX ORDER — PREDNISONE 10 MG/1
TABLET ORAL
Qty: 36 TABLET | Refills: 0 | Status: SHIPPED | OUTPATIENT
Start: 2018-07-05 | End: 2018-07-24

## 2018-07-06 ENCOUNTER — TELEPHONE (OUTPATIENT)
Dept: INTERNAL MEDICINE CLINIC | Age: 53
End: 2018-07-06

## 2018-07-10 RX ORDER — ESCITALOPRAM OXALATE 20 MG/1
TABLET ORAL
Qty: 30 TABLET | Refills: 5 | OUTPATIENT
Start: 2018-07-10

## 2018-07-10 RX ORDER — AMLODIPINE BESYLATE 5 MG/1
TABLET ORAL
Qty: 30 TABLET | Refills: 5 | Status: SHIPPED | OUTPATIENT
Start: 2018-07-10 | End: 2019-01-12 | Stop reason: SDUPTHER

## 2018-07-10 RX ORDER — DOXAZOSIN 8 MG/1
4-8 TABLET ORAL NIGHTLY
Qty: 30 TABLET | Refills: 5 | OUTPATIENT
Start: 2018-07-10

## 2018-07-10 NOTE — TELEPHONE ENCOUNTER
From: Marcos Arce  Sent: 7/10/2018 10:12 AM EDT  Subject: Medication Renewal Request    Marcos Arce would like a refill of the following medications:     amLODIPine (NORVASC) 5 MG tablet JORGE ALBERTO Child - CNP]    Preferred pharmacy: [address] [place] [address],[address] [facility], [address] [place] [address] [phone] -  [phone]    Comment:      Medication renewals requested in this message routed separately:     doxazosin (CARDURA) 8 MG tablet Too Henning MD]     vitamin D-3 (CHOLECALCIFEROL) 5000 units TABS Too Henning MD]

## 2018-07-12 DIAGNOSIS — R73.9 HYPERGLYCEMIA: Primary | ICD-10-CM

## 2018-07-12 RX ORDER — BLOOD-GLUCOSE METER
1 KIT MISCELLANEOUS DAILY
Qty: 1 DEVICE | Refills: 0 | Status: SHIPPED | OUTPATIENT
Start: 2018-07-12 | End: 2018-10-05

## 2018-07-12 RX ORDER — LANCETS 30 GAUGE
EACH MISCELLANEOUS
Qty: 50 EACH | Refills: 1 | Status: SHIPPED | OUTPATIENT
Start: 2018-07-12 | End: 2018-08-02 | Stop reason: SDUPTHER

## 2018-07-24 ENCOUNTER — OFFICE VISIT (OUTPATIENT)
Dept: RHEUMATOLOGY | Age: 53
End: 2018-07-24

## 2018-07-24 ENCOUNTER — TELEPHONE (OUTPATIENT)
Dept: SURGERY | Age: 53
End: 2018-07-24

## 2018-07-24 ENCOUNTER — TELEPHONE (OUTPATIENT)
Dept: INTERNAL MEDICINE CLINIC | Age: 53
End: 2018-07-24

## 2018-07-24 VITALS
SYSTOLIC BLOOD PRESSURE: 136 MMHG | HEIGHT: 73 IN | DIASTOLIC BLOOD PRESSURE: 84 MMHG | WEIGHT: 269.4 LBS | HEART RATE: 77 BPM | BODY MASS INDEX: 35.7 KG/M2 | OXYGEN SATURATION: 96 %

## 2018-07-24 DIAGNOSIS — M31.6 TEMPORAL ARTERITIS (HCC): Primary | ICD-10-CM

## 2018-07-24 DIAGNOSIS — M35.3 POLYMYALGIA RHEUMATICA (HCC): ICD-10-CM

## 2018-07-24 DIAGNOSIS — Z13.820 SCREENING FOR OSTEOPOROSIS: ICD-10-CM

## 2018-07-24 DIAGNOSIS — M31.6 TEMPORAL ARTERITIS (HCC): ICD-10-CM

## 2018-07-24 LAB
A/G RATIO: 2 (ref 1.1–2.2)
ALBUMIN SERPL-MCNC: 4.7 G/DL (ref 3.4–5)
ALP BLD-CCNC: 78 U/L (ref 40–129)
ALT SERPL-CCNC: 42 U/L (ref 10–40)
ANION GAP SERPL CALCULATED.3IONS-SCNC: 16 MMOL/L (ref 3–16)
AST SERPL-CCNC: 27 U/L (ref 15–37)
BASOPHILS ABSOLUTE: 0 K/UL (ref 0–0.2)
BASOPHILS RELATIVE PERCENT: 0.7 %
BILIRUB SERPL-MCNC: 1.5 MG/DL (ref 0–1)
BUN BLDV-MCNC: 12 MG/DL (ref 7–20)
C-REACTIVE PROTEIN: 6.1 MG/L (ref 0–5.1)
CALCIUM SERPL-MCNC: 9.6 MG/DL (ref 8.3–10.6)
CHLORIDE BLD-SCNC: 103 MMOL/L (ref 99–110)
CO2: 24 MMOL/L (ref 21–32)
CREAT SERPL-MCNC: 1.3 MG/DL (ref 0.9–1.3)
EOSINOPHILS ABSOLUTE: 0.1 K/UL (ref 0–0.6)
EOSINOPHILS RELATIVE PERCENT: 1.3 %
GFR AFRICAN AMERICAN: >60
GFR NON-AFRICAN AMERICAN: 58
GLOBULIN: 2.4 G/DL
GLUCOSE BLD-MCNC: 111 MG/DL (ref 70–99)
HCT VFR BLD CALC: 45.9 % (ref 40.5–52.5)
HEMOGLOBIN: 15.6 G/DL (ref 13.5–17.5)
LYMPHOCYTES ABSOLUTE: 0.8 K/UL (ref 1–5.1)
LYMPHOCYTES RELATIVE PERCENT: 19.1 %
MCH RBC QN AUTO: 32.1 PG (ref 26–34)
MCHC RBC AUTO-ENTMCNC: 33.9 G/DL (ref 31–36)
MCV RBC AUTO: 94.7 FL (ref 80–100)
MONOCYTES ABSOLUTE: 0.6 K/UL (ref 0–1.3)
MONOCYTES RELATIVE PERCENT: 13.5 %
NEUTROPHILS ABSOLUTE: 2.8 K/UL (ref 1.7–7.7)
NEUTROPHILS RELATIVE PERCENT: 65.4 %
PDW BLD-RTO: 14.7 % (ref 12.4–15.4)
PLATELET # BLD: 190 K/UL (ref 135–450)
PMV BLD AUTO: 8.2 FL (ref 5–10.5)
POTASSIUM SERPL-SCNC: 4.5 MMOL/L (ref 3.5–5.1)
RBC # BLD: 4.84 M/UL (ref 4.2–5.9)
SEDIMENTATION RATE, ERYTHROCYTE: 6 MM/HR (ref 0–20)
SODIUM BLD-SCNC: 143 MMOL/L (ref 136–145)
TOTAL CK: 245 U/L (ref 39–308)
TOTAL PROTEIN: 7.1 G/DL (ref 6.4–8.2)
WBC # BLD: 4.3 K/UL (ref 4–11)

## 2018-07-24 PROCEDURE — 99245 OFF/OP CONSLTJ NEW/EST HI 55: CPT | Performed by: INTERNAL MEDICINE

## 2018-07-24 RX ORDER — PREDNISONE 20 MG/1
60 TABLET ORAL DAILY
Qty: 90 TABLET | Refills: 1 | Status: SHIPPED | OUTPATIENT
Start: 2018-07-24 | End: 2018-08-28 | Stop reason: SDUPTHER

## 2018-07-24 NOTE — PROGRESS NOTES
2018  Patient Name: Rosa Maria Louise  : 1965  Medical Record: [id number]    MEDICATIONS  Current Outpatient Prescriptions   Medication Sig Dispense Refill    predniSONE (DELTASONE) 20 MG tablet Take 3 tablets by mouth daily 90 tablet 1    Blood Glucose Monitoring Suppl (FREESTYLE LITE) PAULA 1 Device by Does not apply route daily 1 Device 0    blood glucose test strips (FREESTYLE LITE) strip 1 each by In Vitro route daily As needed. 100 each 1    Lancets MISC uad daily 50 each 1    amLODIPine (NORVASC) 5 MG tablet TAKE 1 TABLET BY MOUTH DAILY 30 tablet 5    melatonin 5 MG TABS tablet Take 1-2 tablets by mouth nightly 60 tablet 2    montelukast (SINGULAIR) 10 MG tablet TAKE 1 TABLET BY MOUTH DAILY 30 tablet 5    valsartan (DIOVAN) 160 MG tablet Take 1 tablet by mouth daily 30 tablet 5    esomeprazole (NEXIUM) 40 MG delayed release capsule TAKE ONE CAPSULE BY MOUTH DAILY 30 capsule 5    escitalopram (LEXAPRO) 20 MG tablet TAKE 1  TABLET BY MOUTH DAILY 30 tablet 5    vitamin D-3 (CHOLECALCIFEROL) 5000 units TABS Take 1 tablet by mouth daily 30 tablet 5    doxazosin (CARDURA) 8 MG tablet Take 0.5-1 tablets by mouth nightly 30 tablet 5    rosuvastatin (CRESTOR) 10 MG tablet Take 1 tablet by mouth daily 90 tablet 0    testosterone cypionate (DEPOTESTOTERONE CYPIONATE) 200 MG/ML injection INJECT 1 ML IN THE MUSCLE EVERY 14 DAYS 10 mL 0    hydrochlorothiazide (HYDRODIURIL) 25 MG tablet Take 0.5-1 tablets by mouth daily 30 tablet 5    aspirin (ASPIRIN LOW DOSE) 81 MG tablet Take 1 tablet by mouth daily 365 tablet 0     No current facility-administered medications for this visit. ALLERGIES  Allergies   Allergen Reactions    Iodine Other (See Comments)     IVP dye reaction. Could not stop sneezing. Used a second time, and took benadryl prior and no issues.  Lisinopril Other (See Comments)     Cough           Comments  No specialty comments available.     REFERRING PHYSICIAN: Dr. Jolane Mcburney no organomegaly, no mass, no rebound, no guarding   :  No costovertebral angle tenderness   Integument:  Well hydrated, no rash or telangiectasias  Lymphatic:  No lymphadenopathy noted   Neurologic:   Alert & oriented x 3, CN 2-12 normal, no focal deficits noted. Sensations Intact. Muscle strength 5/5 proximally and distally in upper and lower extremities.    Psychiatric:  Speech and behavior appropriate           LABS AND IMAGING  Outside data reviewed and in HPI    Lab Results   Component Value Date    WBC 6.0 06/04/2018    RBC 4.64 06/04/2018    HGB 15.1 06/04/2018    HCT 43.1 06/04/2018     06/04/2018    MCV 92.9 06/04/2018    MCH 32.5 06/04/2018    MCHC 35.0 06/04/2018    RDW 14.2 06/04/2018    SEGSPCT 73.0 08/18/2011    LYMPHOPCT 9.2 06/04/2018    MONOPCT 18.1 06/04/2018    EOSPCT 0.6 08/18/2011    BASOPCT 0.5 06/04/2018    MONOSABS 1.1 06/04/2018    LYMPHSABS 0.5 06/04/2018    EOSABS 0.0 06/04/2018    BASOSABS 0.0 06/04/2018    DIFFTYPE Auto 08/18/2011       Chemistry        Component Value Date/Time     06/04/2018 0849    K 4.3 06/04/2018 0849     06/04/2018 0849    CO2 28 06/04/2018 0849    BUN 10 06/04/2018 0849    CREATININE 1.2 06/04/2018 0849        Component Value Date/Time    CALCIUM 9.4 06/04/2018 0849    ALKPHOS 87 06/04/2018 0849    AST 23 06/04/2018 0849    ALT 31 06/04/2018 0849    BILITOT 1.2 (H) 06/04/2018 0849          Lab Results   Component Value Date    SEDRATE 32 (H) 06/04/2018     Lab Results   Component Value Date    CRP 56.5 (H) 06/04/2018     Lab Results   Component Value Date    ZEUS Negative 06/07/2018     Lab Results   Component Value Date    RF <10.0 06/07/2018    CCPABIGG 7 06/07/2018     Lab Results   Component Value Date    ZEUS Negative 06/07/2018    ANAINT see below 06/07/2018     No results found for: DSDNAG, DSDNAIGGIFA  No results found for: SSAROAB, SSALAAB  No results found for: SMAB, RNPAB  No results found for: CENTABIGG  No results found for: C3, C4, ACE  Lab Results   Component Value Date    VITD25 23.1 04/16/2018       ASSESSMENT AND PLAN      Assessment/Plan:      ASSESSMENT:    1. Temporal arteritis ([facility] [address]    2. Polymyalgia rheumatica ([facility] [address]    3. Screening for osteoporosis        PLAN:     Xiang Lu was seen today for new patient, annual exam, knee pain and hand pain. Diagnoses and all orders for this visit:    Temporal arteritis ([facility] [address]  -     predniSONE (DELTASONE) 20 MG tablet; Take 3 tablets by mouth daily  -     Hepatitis B Core Antibody, IgM; Future  -     Hepatitis B Surface Antigen; Future  -     Hepatitis C Antibody; Future  -     C-Reactive Protein; Future  -     Sedimentation Rate; Future  -     CBC Auto Differential; Future  -     Comprehensive Metabolic Panel; Future  -     Joaquina Titer IgG by IFA Reflex; Future  -     Rosenda Kim MD  -     CK; Future    Polymyalgia rheumatica (HCC)  -     predniSONE (DELTASONE) 20 MG tablet; Take 3 tablets by mouth daily  -     Hepatitis B Core Antibody, IgM; Future  -     Hepatitis B Surface Antigen; Future  -     Hepatitis C Antibody; Future  -     C-Reactive Protein; Future  -     Sedimentation Rate; Future  -     CBC Auto Differential; Future  -     Comprehensive Metabolic Panel; Future  -     Joaquina Titer IgG by IFA Reflex; Future  -     Rosenda Kim MD  -     CK; Future    Screening for osteoporosis  -     DEXA Bone Density Axial Skeleton; Future    -Symptoms of jaw claudication, polymyalgia rheumatica, ESR 32, CRP 56.5, significant response to prednisone-most likely possibility temporal arteritis. I will refer to vascular surgery for bilateral temporal artery biopsy. I will start prednisone 60 mg daily and continue aspirin 81 mg daily. We will check DEXA scan to evaluate for bone density. Advised to continue calcium and vitamin D. Advised to call us or go to the ER if symptoms worsen or fail to improve.      Side effects of prednisone including osteoporosis, avascular necrosis, weight gain, MOOD CHANGES, trouble sleeping, easy bruising, cataract, adrenal insufficiency, glucose intolerance were explained in detail    Time spent:  Time spent with the patient 80  minutes and 50% of the time spent on diagnosis, management, medication side effects and reviewing medical records    The patient indicates understanding of these issues and agrees with the plan. Return in about 8 days (around 8/1/2018). The risks and benefits of my recommendations, as well as other treatment options, benefits and side effects were discussed with the patient. All questions were answered. I reviewed patient's history, referral documents and electronic medical records  Copy of consult note is being routed electronically/faxed to referring physician         ######################################################################    I thank you for giving me the opportunity to participate in [facility] care. If you have any questions or concerns please feel free to contact me. I look forward to following  Magali Williamson along with you. Electronically signed by: Dayna Norman MD, 7/24/2018 10:49 AM    Documentation was done using voice recognition dragon software. Every effort was made to ensure accuracy; however, inadvertent unintentional computerized transcription errors may be present.

## 2018-07-24 NOTE — LETTER
significant response to prednisone-most likely possibility temporal arteritis. I will refer to vascular surgery for bilateral temporal artery biopsy. I will start prednisone 60 mg daily and continue aspirin 81 mg daily. We will check DEXA scan to evaluate for bone density. Advised to continue calcium and vitamin D. Advised to call us or go to the ER if symptoms worsen or fail to improve. Side effects of prednisone including osteoporosis, avascular necrosis, weight gain, MOOD CHANGES, trouble sleeping, easy bruising, cataract, adrenal insufficiency, glucose intolerance were explained in detail    Time spent:  Time spent with the patient 80  minutes and 50% of the time spent on diagnosis, management, medication side effects and reviewing medical records    The patient indicates understanding of these issues and agrees with the plan. Return in about 8 days (around 8/1/2018). The risks and benefits of my recommendations, as well as other treatment options, benefits and side effects were discussed with the patient. All questions were answered. I reviewed patient's history, referral documents and electronic medical records  Copy of consult note is being routed electronically/faxed to referring physician             If you have questions, please do not hesitate to call me. I look forward to following Víctor Morel along with you.     Sincerely,        Gabby Dela Cruz MD

## 2018-07-25 ENCOUNTER — TELEPHONE (OUTPATIENT)
Dept: SURGERY | Age: 53
End: 2018-07-25

## 2018-07-25 LAB
HEPATITIS B CORE IGM ANTIBODY: NORMAL
HEPATITIS B SURFACE ANTIGEN INTERPRETATION: NORMAL
HEPATITIS C ANTIBODY INTERPRETATION: NORMAL

## 2018-07-25 NOTE — TELEPHONE ENCOUNTER
Left a voice mail message asking patient to return call to [phone] -  Regarding a Temporal Artery Bx request by Dr. Gilford Ables.

## 2018-07-27 ENCOUNTER — PROCEDURE VISIT (OUTPATIENT)
Dept: SURGERY | Age: 53
End: 2018-07-27

## 2018-07-27 VITALS
HEART RATE: 88 BPM | BODY MASS INDEX: 35.65 KG/M2 | SYSTOLIC BLOOD PRESSURE: 138 MMHG | DIASTOLIC BLOOD PRESSURE: 86 MMHG | HEIGHT: 73 IN | WEIGHT: 269 LBS

## 2018-07-27 DIAGNOSIS — M31.6 TEMPORAL ARTERITIS SYNDROME (HCC): Primary | ICD-10-CM

## 2018-07-27 DIAGNOSIS — G44.1 OTHER VASCULAR HEADACHE: ICD-10-CM

## 2018-07-27 LAB — ANA BY IFA: NORMAL

## 2018-07-27 PROCEDURE — 37609 LIGATION/BX TEMPORAL ARTERY: CPT | Performed by: SURGERY

## 2018-08-01 ENCOUNTER — TELEPHONE (OUTPATIENT)
Dept: SURGERY | Age: 53
End: 2018-08-01

## 2018-08-02 ENCOUNTER — OFFICE VISIT (OUTPATIENT)
Dept: RHEUMATOLOGY | Age: 53
End: 2018-08-02

## 2018-08-02 ENCOUNTER — OFFICE VISIT (OUTPATIENT)
Dept: FAMILY MEDICINE CLINIC | Age: 53
End: 2018-08-02

## 2018-08-02 VITALS
SYSTOLIC BLOOD PRESSURE: 132 MMHG | HEART RATE: 74 BPM | DIASTOLIC BLOOD PRESSURE: 84 MMHG | RESPIRATION RATE: 16 BRPM | BODY MASS INDEX: 35.52 KG/M2 | WEIGHT: 268 LBS | HEIGHT: 73 IN | OXYGEN SATURATION: 96 %

## 2018-08-02 VITALS
DIASTOLIC BLOOD PRESSURE: 98 MMHG | SYSTOLIC BLOOD PRESSURE: 158 MMHG | HEART RATE: 64 BPM | HEIGHT: 73 IN | WEIGHT: 268 LBS | BODY MASS INDEX: 35.52 KG/M2 | OXYGEN SATURATION: 96 %

## 2018-08-02 DIAGNOSIS — M31.6 TEMPORAL ARTERITIS (HCC): Primary | ICD-10-CM

## 2018-08-02 DIAGNOSIS — R73.03 PREDIABETES: Primary | ICD-10-CM

## 2018-08-02 DIAGNOSIS — R73.9 ELEVATED BLOOD SUGAR: ICD-10-CM

## 2018-08-02 DIAGNOSIS — M35.3 POLYMYALGIA RHEUMATICA (HCC): ICD-10-CM

## 2018-08-02 DIAGNOSIS — M31.6 TEMPORAL ARTERITIS (HCC): ICD-10-CM

## 2018-08-02 DIAGNOSIS — Z79.52 LONG TERM (CURRENT) USE OF SYSTEMIC STEROIDS: ICD-10-CM

## 2018-08-02 DIAGNOSIS — I10 ESSENTIAL HYPERTENSION: ICD-10-CM

## 2018-08-02 DIAGNOSIS — Z79.52 LONG TERM SYSTEMIC STEROID USER: ICD-10-CM

## 2018-08-02 LAB — HBA1C MFR BLD: 6 %

## 2018-08-02 PROCEDURE — 99214 OFFICE O/P EST MOD 30 MIN: CPT | Performed by: INTERNAL MEDICINE

## 2018-08-02 PROCEDURE — 99214 OFFICE O/P EST MOD 30 MIN: CPT | Performed by: NURSE PRACTITIONER

## 2018-08-02 PROCEDURE — 83036 HEMOGLOBIN GLYCOSYLATED A1C: CPT | Performed by: NURSE PRACTITIONER

## 2018-08-02 RX ORDER — DOXAZOSIN 8 MG/1
4-8 TABLET ORAL NIGHTLY
Qty: 30 TABLET | Refills: 5 | Status: SHIPPED | OUTPATIENT
Start: 2018-08-02 | End: 2018-09-17 | Stop reason: SDUPTHER

## 2018-08-02 RX ORDER — LANCETS 30 GAUGE
EACH MISCELLANEOUS
Qty: 100 EACH | Refills: 1 | Status: SHIPPED | OUTPATIENT
Start: 2018-08-02 | End: 2018-10-05

## 2018-08-02 RX ORDER — ALENDRONATE SODIUM 70 MG/1
70 TABLET ORAL
Qty: 4 TABLET | Refills: 3 | Status: SHIPPED | OUTPATIENT
Start: 2018-08-02 | End: 2018-09-28 | Stop reason: SDUPTHER

## 2018-08-02 RX ORDER — CHOLECALCIFEROL (VITAMIN D3) 125 MCG
5-10 CAPSULE ORAL NIGHTLY
Qty: 60 TABLET | Refills: 2 | Status: SHIPPED | OUTPATIENT
Start: 2018-08-02 | End: 2018-09-17 | Stop reason: SDUPTHER

## 2018-08-02 NOTE — PATIENT INSTRUCTIONS
If fasting sugar consistently over 130 fasting or 160 2 hours after meals, notify office. Add the water pill 1/2 tab daily for BP control and notify if BP not well controlled.

## 2018-08-02 NOTE — PROGRESS NOTES
sites over temples. Psychiatric: Mood, memory, affect and judgment normal.   Nursing note and vitals reviewed. Vitals:    08/02/18 1454   BP: 132/84   Site: Left Arm   Position: Sitting   Cuff Size: Large Adult   Pulse: 74   Resp: 16   SpO2: 96%   Weight: 268 lb (121.6 kg)   Height: 6' 1\" (1.854 m)     Body mass index is 35.36 kg/m². Wt Readings from Last 3 Encounters:   08/02/18 268 lb (121.6 kg)   08/02/18 268 lb (121.6 kg)   07/27/18 269 lb (122 kg)     BP Readings from Last 3 Encounters:   08/02/18 132/84   08/02/18 (!) 158/98   07/27/18 138/86        Results for POC orders placed in visit on 08/02/18   POCT glycosylated hemoglobin (Hb A1C)   Result Value Ref Range    Hemoglobin A1C 6.0 %       Assessment     Diagnosis Orders   1. Prediabetes  blood glucose test strips (FREESTYLE LITE) strip   2. Elevated blood sugar  POCT glycosylated hemoglobin (Hb A1C)   3. Long term systemic steroid user     4. Essential hypertension     5. Polymyalgia rheumatica ([facility] [address]     6. Temporal arteritis (HCC)           Plan    Increase the norvasc to 10 mg daily. Or try 1/2 HCTZ to regimen for BP control. Add metformin if sugars rise. Parameters given. Due to the chronic steroid use needed for his Temporal arteritis and Polymyalgia rheumatica, the sugars are likely to rise. Discussed diet and exercise to toleration to control. Duration of today's visit was 30 minutes, with greater than 50% being counseling and care planning. Discussion surrounded diet control of sugars, testing of sugars, sugar parameters, the effect of steroids on the sugars. The likelihood that the sugars will rise with the continued steroid use despite good diet and exercise management. Return in about 3 months (around 11/2/2018) for prediabetes and HTN. Patient Instructions   If fasting sugar consistently over 130 fasting or 160 2 hours after meals, notify office.    Add the water pill 1/2 tab daily for BP control and notify if BP not well controlled.

## 2018-08-02 NOTE — PROGRESS NOTES
Comments  No specialty comments available. Background history:  Ana Haley is a 50 y.o. male asthma, GERD, hypertension, hyperlipidemia, migraine who is being seen for follow up evaluation of  musculoskeletal pain. His symptoms started in June 2018 with widespread musculoskeletal pain. He also had associated stiffness. Symptoms are worse around the shoulders and hips. Left shoulder was worse than the right shoulder His whole body felt achy, stiff and sore. He had difficulty climbing the steps, getting in and out of the car, reaching to the cupboard. He also had associated fever, chills or night sweats. He had difficulty sleeping due to pain and stiffness. He had occasional headaches, blurry vision and pain in the jaw with chewing. He denies any extremity weakness. He was given 2 courses of prednisone with significant improvement but his symptoms returned off of prednisone. He is off of prednisone for past 5 days and his symptoms have started to come back. Workup showed normal RF, CCP, ESR 32 and CRP 56.5. He denies psoriasis, inflammatory bowel disease, inflammatory back pain, dactylitis, enthesitis or tenosynovitis. Reviewed notes by [facility]: The patient was referred due to muscle and joint pains. Workup was done which revealed negative RF, CCP, elevated ESR and CRP. Interim History: He presents for a follow-up appointment at a polymyalgia rheumatica/temporal arteritis. He had bilateral temporal artery biopsy which was unremarkable. He was on prednisone for few weeks before the biopsy was done. He is on prednisone 60 mg daily and has noticed that significant improvement in stiffness around shoulders and hips and jaw pain. He denies any headaches, vision changes, extremity weakness. He denies any side effects with prednisone.   HPI  ROS    REVIEW OF SYSTEMS: Positive for fatigue, fever, double vision, ringing in ears, dysphagia, shortness of breath, constipation, recommendations, as well as other treatment options, benefits and side effects were discussed with the patient. All questions were answered. ######################################################################    I thank you for giving me the opportunity to participate in [facility]. If you have any questions or concerns please feel free to contact me. I look forward to following  Miles Narrow along with you. Electronically signed by: Sylvia Clark MD, 8/2/2018 10:32 AM    Documentation was done using voice recognition dragon software. Every effort was made to ensure accuracy; however, inadvertent unintentional computerized transcription errors may be present.

## 2018-08-03 PROBLEM — M35.3 POLYMYALGIA RHEUMATICA (HCC): Status: ACTIVE | Noted: 2018-08-03

## 2018-08-03 PROBLEM — M31.6 TEMPORAL ARTERITIS (HCC): Status: ACTIVE | Noted: 2018-08-03

## 2018-08-09 DIAGNOSIS — E34.9 TESTOSTERONE DEFICIENCY: Primary | ICD-10-CM

## 2018-08-09 DIAGNOSIS — M35.3 POLYMYALGIA RHEUMATICA (HCC): ICD-10-CM

## 2018-08-09 DIAGNOSIS — M31.6 TEMPORAL ARTERITIS (HCC): ICD-10-CM

## 2018-08-09 RX ORDER — TESTOSTERONE CYPIONATE 200 MG/ML
200 INJECTION INTRAMUSCULAR
Qty: 10 ML | Refills: 0 | Status: SHIPPED | OUTPATIENT
Start: 2018-08-09 | End: 2019-03-12 | Stop reason: SDUPTHER

## 2018-08-09 RX ORDER — TRAMADOL HYDROCHLORIDE 50 MG/1
50 TABLET ORAL EVERY 4 HOURS PRN
Qty: 42 TABLET | Refills: 0 | Status: SHIPPED | OUTPATIENT
Start: 2018-08-09 | End: 2018-09-28 | Stop reason: SDUPTHER

## 2018-08-09 NOTE — TELEPHONE ENCOUNTER
----- Message -----  Cindy Pulido From: Elham Ruiz. Jennifer Gunter     Sent: 8/9/2018  2:12 PM EDT       To: Axel Lentz, APRN - CNP  Subject: Non-Urgent Medical Question    Aide Heard,  Is it ok for Vielka Neat to start the testosterone again? Also, He is not doing well. Hes bloated from prednisone, having chest pains, not sleeping and more pain in his hips and side of head. His rheumatoid MD [place]. Can you call in a light pain med for him? It took me 2 days to convince him to send you this message. He hasnt even seen Dr. Jeet Elmore about this condition, thats why Im contacting you. Thank you, Satish Spine in refill of T and said I don't see why he would be able to restart. Also sent tramadol for pain.

## 2018-08-23 ENCOUNTER — HOSPITAL ENCOUNTER (OUTPATIENT)
Dept: WOMENS IMAGING | Age: 53
Discharge: HOME | End: 2018-08-23
Admitting: INTERNAL MEDICINE

## 2018-08-23 DIAGNOSIS — Z13.820 ENCOUNTER FOR SCREENING FOR OSTEOPOROSIS: ICD-10-CM

## 2018-08-27 ENCOUNTER — TELEPHONE (OUTPATIENT)
Dept: INTERNAL MEDICINE CLINIC | Age: 53
End: 2018-08-27

## 2018-08-30 ENCOUNTER — TELEPHONE (OUTPATIENT)
Dept: FAMILY MEDICINE CLINIC | Age: 53
End: 2018-08-30

## 2018-08-30 DIAGNOSIS — H40.053 RAISED INTRAOCULAR PRESSURE OF BOTH EYES: Primary | ICD-10-CM

## 2018-09-12 RX ORDER — ESCITALOPRAM OXALATE 20 MG/1
TABLET ORAL
Qty: 30 TABLET | Refills: 5 | Status: SHIPPED | OUTPATIENT
Start: 2018-09-12 | End: 2019-04-02 | Stop reason: SDUPTHER

## 2018-09-12 RX ORDER — AMLODIPINE BESYLATE 5 MG/1
TABLET ORAL
Qty: 30 TABLET | Refills: 5 | OUTPATIENT
Start: 2018-09-12

## 2018-09-12 NOTE — TELEPHONE ENCOUNTER
From: Leonides Fulton  Sent: 9/12/2018 4:26 PM EDT  Subject: Medication Renewal Request    Leonides Fulton would like a refill of the following medications:     escitalopram (LEXAPRO) 20 MG tablet Kyle Silva MD]    Preferred pharmacy: [address] [place] [address],[address] [facility], [address] [place], [place] [phone] - F [phone]    Comment:      Medication renewals requested in this message routed separately:     amLODIPine (NORVASC) 5 MG tablet Abril Kim, APRN - CNP]

## 2018-09-12 NOTE — TELEPHONE ENCOUNTER
From: Ana Haley  Sent: 9/12/2018 4:26 PM EDT  Subject: Medication Renewal Request    Ana Haley would like a refill of the following medications:     amLODIPine (NORVASC) 5 MG tablet JORGE ALBERTO Hall CNP]    Preferred pharmacy: [address] [place] [address],[address], [address] [place] [address] RD - P [phone] - F [phone]    Comment:      Medication renewals requested in this message routed separately:     escitalopram (LEXAPRO) 20 MG tablet Jennifer Stevenson MD]

## 2018-09-17 RX ORDER — CHOLECALCIFEROL (VITAMIN D3) 125 MCG
5-10 CAPSULE ORAL NIGHTLY
Qty: 60 TABLET | Refills: 2 | Status: SHIPPED | OUTPATIENT
Start: 2018-09-17 | End: 2021-12-16 | Stop reason: ALTCHOICE

## 2018-09-17 RX ORDER — DOXAZOSIN 8 MG/1
4-8 TABLET ORAL NIGHTLY
Qty: 30 TABLET | Refills: 2 | Status: SHIPPED | OUTPATIENT
Start: 2018-09-17 | End: 2019-02-15 | Stop reason: SDUPTHER

## 2018-09-17 NOTE — TELEPHONE ENCOUNTER
From: Maribel Martin  Sent: 9/17/2018 3:55 PM EDT  Subject: Medication Renewal Request    Maribel Martin would like a refill of the following medications:     doxazosin (CARDURA) 8 MG tablet JORGE ALBERTO Almeida CNP]     melatonin 5 MG TABS tablet JORGE ALBERTO Almeida - CNP]    Preferred pharmacy: [address] [phone] - F [phone]    Comment:

## 2018-09-20 ENCOUNTER — OFFICE VISIT (OUTPATIENT)
Dept: RHEUMATOLOGY | Age: 53
End: 2018-09-20

## 2018-09-20 VITALS
DIASTOLIC BLOOD PRESSURE: 85 MMHG | BODY MASS INDEX: 35.12 KG/M2 | HEART RATE: 64 BPM | SYSTOLIC BLOOD PRESSURE: 121 MMHG | HEIGHT: 73 IN | WEIGHT: 265 LBS

## 2018-09-20 DIAGNOSIS — Z79.52 LONG TERM (CURRENT) USE OF SYSTEMIC STEROIDS: ICD-10-CM

## 2018-09-20 DIAGNOSIS — M35.3 POLYMYALGIA RHEUMATICA (HCC): ICD-10-CM

## 2018-09-20 DIAGNOSIS — M31.6 TEMPORAL ARTERITIS (HCC): Primary | ICD-10-CM

## 2018-09-20 DIAGNOSIS — M31.6 TEMPORAL ARTERITIS (HCC): ICD-10-CM

## 2018-09-20 LAB
A/G RATIO: 2.1 (ref 1.1–2.2)
ALBUMIN SERPL-MCNC: 4.4 G/DL (ref 3.4–5)
ALP BLD-CCNC: 77 U/L (ref 40–129)
ALT SERPL-CCNC: 32 U/L (ref 10–40)
ANION GAP SERPL CALCULATED.3IONS-SCNC: 15 MMOL/L (ref 3–16)
AST SERPL-CCNC: 15 U/L (ref 15–37)
BASOPHILS ABSOLUTE: 0 K/UL (ref 0–0.2)
BASOPHILS RELATIVE PERCENT: 0.6 %
BILIRUB SERPL-MCNC: 1.4 MG/DL (ref 0–1)
BUN BLDV-MCNC: 16 MG/DL (ref 7–20)
C-REACTIVE PROTEIN: 3.1 MG/L (ref 0–5.1)
CALCIUM SERPL-MCNC: 9.3 MG/DL (ref 8.3–10.6)
CHLORIDE BLD-SCNC: 102 MMOL/L (ref 99–110)
CO2: 26 MMOL/L (ref 21–32)
CREAT SERPL-MCNC: 1.1 MG/DL (ref 0.9–1.3)
EOSINOPHILS ABSOLUTE: 0 K/UL (ref 0–0.6)
EOSINOPHILS RELATIVE PERCENT: 0.4 %
GFR AFRICAN AMERICAN: >60
GFR NON-AFRICAN AMERICAN: >60
GLOBULIN: 2.1 G/DL
GLUCOSE BLD-MCNC: 136 MG/DL (ref 70–99)
HCT VFR BLD CALC: 42.1 % (ref 40.5–52.5)
HEMOGLOBIN: 14.3 G/DL (ref 13.5–17.5)
LYMPHOCYTES ABSOLUTE: 1.7 K/UL (ref 1–5.1)
LYMPHOCYTES RELATIVE PERCENT: 21.5 %
MCH RBC QN AUTO: 32.4 PG (ref 26–34)
MCHC RBC AUTO-ENTMCNC: 34 G/DL (ref 31–36)
MCV RBC AUTO: 95.2 FL (ref 80–100)
MONOCYTES ABSOLUTE: 0.7 K/UL (ref 0–1.3)
MONOCYTES RELATIVE PERCENT: 8.8 %
NEUTROPHILS ABSOLUTE: 5.6 K/UL (ref 1.7–7.7)
NEUTROPHILS RELATIVE PERCENT: 68.7 %
PDW BLD-RTO: 15.8 % (ref 12.4–15.4)
PLATELET # BLD: 224 K/UL (ref 135–450)
PMV BLD AUTO: 7.8 FL (ref 5–10.5)
POTASSIUM SERPL-SCNC: 4.2 MMOL/L (ref 3.5–5.1)
RBC # BLD: 4.42 M/UL (ref 4.2–5.9)
SODIUM BLD-SCNC: 143 MMOL/L (ref 136–145)
TOTAL PROTEIN: 6.5 G/DL (ref 6.4–8.2)
WBC # BLD: 8.1 K/UL (ref 4–11)

## 2018-09-20 PROCEDURE — 99214 OFFICE O/P EST MOD 30 MIN: CPT | Performed by: INTERNAL MEDICINE

## 2018-09-20 RX ORDER — FOLIC ACID 1 MG/1
1 TABLET ORAL DAILY
Qty: 30 TABLET | Refills: 3 | Status: SHIPPED | OUTPATIENT
Start: 2018-09-20 | End: 2019-03-12 | Stop reason: ALTCHOICE

## 2018-09-20 NOTE — PATIENT INSTRUCTIONS
- MRI BRAIN W WO CONTRAST; Future  - CBC Auto Differential; Future  - Comprehensive Metabolic Panel; Future  - C-Reactive Protein; Future  - Sedimentation Rate; Future  - methotrexate (RHEUMATREX) 2.5 MG chemo tablet; Take 4 pills/week for 2 weeks,  increase to 6 pills/week for 2 weeks and then 8 pills/week thereafter. Take all pills at the same time  Dispense: 40 tablet; Refill: 1  - folic acid (FOLVITE) 1 MG tablet; Take 1 tablet by mouth daily  Dispense: 30 tablet;  Refill: 3

## 2018-09-20 NOTE — PROGRESS NOTES
2018  Patient Name: Payton Evans  : 1965  Medical Record: [id number]    MEDICATIONS  Current Outpatient Prescriptions   Medication Sig Dispense Refill    methotrexate (RHEUMATREX) 2.5 MG chemo tablet Take 4 pills/week for 2 weeks,  increase to 6 pills/week for 2 weeks and then 8 pills/week thereafter. Take all pills at the same time 40 tablet 1    folic acid (FOLVITE) 1 MG tablet Take 1 tablet by mouth daily 30 tablet 3    doxazosin (CARDURA) 8 MG tablet Take 0.5-1 tablets by mouth nightly 30 tablet 2    melatonin 5 MG TABS tablet Take 1-2 tablets by mouth nightly 60 tablet 2    escitalopram (LEXAPRO) 20 MG tablet TAKE 1  TABLET BY MOUTH DAILY 30 tablet 5    predniSONE (DELTASONE) 5 MG tablet Take 7 tabs/day for 1 week, 6 tabs/day for 1 week, 5 tabs/day for 1 week and then 4 tabs daily thereafter 154 tablet 0    testosterone cypionate (DEPOTESTOTERONE CYPIONATE) 200 MG/ML injection Inject 1 mL into the muscle every 14 days for 10 doses. . 10 mL 0    alendronate (FOSAMAX) 70 MG tablet Take 1 tablet by mouth every 7 days 4 tablet 3    blood glucose test strips (FREESTYLE LITE) strip 1 each by In Vitro route 2 times daily As needed.  100 each 1    Lancets MISC  each 1    Blood Glucose Monitoring Suppl (FREESTYLE LITE) PAULA 1 Device by Does not apply route daily 1 Device 0    amLODIPine (NORVASC) 5 MG tablet TAKE 1 TABLET BY MOUTH DAILY 30 tablet 5    montelukast (SINGULAIR) 10 MG tablet TAKE 1 TABLET BY MOUTH DAILY 30 tablet 5    esomeprazole (NEXIUM) 40 MG delayed release capsule TAKE ONE CAPSULE BY MOUTH DAILY 30 capsule 5    vitamin D-3 (CHOLECALCIFEROL) 5000 units TABS Take 1 tablet by mouth daily 30 tablet 5    rosuvastatin (CRESTOR) 10 MG tablet Take 1 tablet by mouth daily 90 tablet 0    aspirin (ASPIRIN LOW DOSE) 81 MG tablet Take 1 tablet by mouth daily 365 tablet 0    hydrochlorothiazide (HYDRODIURIL) 25 MG tablet Take 0.5-1 tablets by mouth daily 30 tablet 5     No is complaining of worsening achiness and stiffness around the hips, occasional headaches and occasional jaw pain with chewing hard solid foods. He had bilateral temporal artery biopsy which was unremarkable. He was on prednisone for few weeks before the biopsy was done. HPI  ROS    REVIEW OF SYSTEMS: sleep disturbance  Constitutional: No unanticipated weight loss   Integumentary: No rash, photosensitivity, malar rash, livedo reticularis, alopecia and Raynaud's symptoms, sclerodactyly, skin tightening  Eyes: negative for dry eyes, visual disturbance and persistent redness, discharge from eyes   ENT: - No  loss of hearing, vertigo, or recurrent ear infections.  - No history of nasal/oral ulcers. - No history of sicca symptoms. Cardiovascular: No history of pericarditis, chest pain or murmur or palpitations  Respiratory: No cough or history of interstitial lung disease. No history of pleurisy. No history of tuberculosis or atypical infections. Gastrointestinal: No history of esophageal dysmotility. No inflammatory bowel disease. Genitourinary: No history renal disease, miscarriages. Hematologic/Lymphatic: No abnormal bruising or bleeding, blood clots or swollen lymph nodes. Neurological: No history seizure or focal weakness. No history of neuropathies, paresthesias or hyperesthesias, facial droop  Psychiatric: No history of bipolar disease, anxiety, depression  Endocrine: Denies any thyroid / parathyroid disease and osteoporosis  Allergic/Immunologic: No nasal congestion or hives. I have reviewed patients Past medical History, Social History and Family History as mentioned in her chart and this remains unchanged from previous. Past Medical History:   Diagnosis Date    Asthma     Colon polyps 06/21/2018    COLONOSCOPY, DR CARTER GARCIA, CECUM POLYP TUBULAR ADENOMAS.  Gastritis 06/21/2018    EGD, DR CARTER GARCIA, MILD CHRONIC GASTRITIS.     GERD (gastroesophageal reflux 07/24/2018 1111    ALKPHOS 78 07/24/2018 1111    AST 27 07/24/2018 1111    ALT 42 (H) 07/24/2018 1111    BILITOT 1.5 (H) 07/24/2018 1111          Lab Results   Component Value Date    SEDRATE 6 07/24/2018     Lab Results   Component Value Date    CRP 6.1 (H) 07/24/2018     Lab Results   Component Value Date    ZEUS Negative 06/07/2018     Lab Results   Component Value Date    RF <10.0 06/07/2018    CCPABIGG 7 06/07/2018     Lab Results   Component Value Date    ZEUS Negative 06/07/2018    ANAINT see below 06/07/2018     No results found for: DSDNAG, DSDNAIGGIFA  No results found for: SSAROAB, SSALAAB  No results found for: SMAB, RNPAB  No results found for: CENTABIGG  No results found for: C3, C4, ACE  Lab Results   Component Value Date    VITD25 23.1 04/16/2018       ASSESSMENT AND PLAN      Assessment/Plan:      ASSESSMENT:    1. Temporal arteritis ([facility] [address]    2. Polymyalgia rheumatica ([facility] [address]    3. Long term (current) use of systemic steroids        PLAN:   1. Temporal arteritis ([facility] [address]  Biopsy results inconclusive-he was on the prednisone for a few weeks before biopsy was done  -Elevated ESR and CRP  -Significant improvement in stiffness on prednisone-now with mild recurrence with fast prednisone taper.  -Prednisone tapered off as due to ocular hypertension  -We will discuss with ophthalmology about increasing the prednisone dose  -He will continue prednisone 20 mg for now  -I will start methotrexate and folic acid  -History of precancerous colon polyps-we'll hold off on starting actemra  -I will also order MRI of the brain to rule out intracranial pathology  -Continue aspirin 81 mg daily  -Advised to call his back if he has recurrence of headache, vision changes, jaw pain or extremity weakness      - MRI BRAIN W WO CONTRAST; Future  - CBC Auto Differential; Future  - Comprehensive Metabolic Panel; Future  - C-Reactive Protein; Future  - Sedimentation Rate; Future  - methotrexate (RHEUMATREX) 2.5 MG chemo tablet;  Take 4 pills/week for 2 weeks,  increase to 6 pills/week for 2 weeks and then 8 pills/week thereafter. Take all pills at the same time  Dispense: 40 tablet; Refill: 1  - folic acid (FOLVITE) 1 MG tablet; Take 1 tablet by mouth daily  Dispense: 30 tablet; Refill: 3    2. Polymyalgia rheumatica (HCC)  - methotrexate (RHEUMATREX) 2.5 MG chemo tablet; Take 4 pills/week for 2 weeks,  increase to 6 pills/week for 2 weeks and then 8 pills/week thereafter. Take all pills at the same time  Dispense: 40 tablet; Refill: 1  - folic acid (FOLVITE) 1 MG tablet; Take 1 tablet by mouth daily  Dispense: 30 tablet; Refill: 3    3. Long term (current) use of systemic steroids  Continue calcium, vitamin D and Fosamax    Addendum: Discussed with ophthalmology. Okay to increase prednisone if necessary. Ocular pressure controlled on Combivent drops    The patient indicates understanding of these issues and agrees with the plan. Return in about 4 weeks (around 10/18/2018). The risks and benefits of my recommendations, as well as other treatment options, benefits and side effects were discussed with the patient. All questions were answered. ######################################################################    I thank you for giving me the opportunity to participate in [facility]. If you have any questions or concerns please feel free to contact me. I look forward to following  Yefri Abdi along with you. Electronically signed by: Lindsay Madsen MD, 9/20/2018 12:25 PM    Documentation was done using voice recognition dragon software. Every effort was made to ensure accuracy; however, inadvertent unintentional computerized transcription errors may be present.

## 2018-09-21 LAB — SEDIMENTATION RATE, ERYTHROCYTE: 9 MM/HR (ref 0–20)

## 2018-09-24 ENCOUNTER — TELEPHONE (OUTPATIENT)
Dept: INTERNAL MEDICINE CLINIC | Age: 53
End: 2018-09-24

## 2018-09-27 RX ORDER — ESOMEPRAZOLE MAGNESIUM 40 MG/1
CAPSULE, DELAYED RELEASE ORAL
Qty: 30 CAPSULE | Refills: 5 | Status: SHIPPED | OUTPATIENT
Start: 2018-09-27 | End: 2018-09-28 | Stop reason: SDUPTHER

## 2018-09-28 DIAGNOSIS — M35.3 POLYMYALGIA RHEUMATICA (HCC): ICD-10-CM

## 2018-09-28 DIAGNOSIS — Z79.52 LONG TERM (CURRENT) USE OF SYSTEMIC STEROIDS: ICD-10-CM

## 2018-09-28 DIAGNOSIS — M31.6 TEMPORAL ARTERITIS (HCC): ICD-10-CM

## 2018-09-28 RX ORDER — ESOMEPRAZOLE MAGNESIUM 40 MG/1
CAPSULE, DELAYED RELEASE ORAL
Qty: 30 CAPSULE | Refills: 5 | Status: SHIPPED | OUTPATIENT
Start: 2018-09-28 | End: 2019-11-01 | Stop reason: SDUPTHER

## 2018-09-28 RX ORDER — ALENDRONATE SODIUM 70 MG/1
70 TABLET ORAL
Qty: 4 TABLET | Refills: 1 | Status: SHIPPED | OUTPATIENT
Start: 2018-09-28 | End: 2018-11-20 | Stop reason: SDUPTHER

## 2018-10-01 ENCOUNTER — TELEPHONE (OUTPATIENT)
Dept: FAMILY MEDICINE CLINIC | Age: 53
End: 2018-10-01

## 2018-10-01 ENCOUNTER — TELEPHONE (OUTPATIENT)
Dept: RHEUMATOLOGY | Age: 53
End: 2018-10-01

## 2018-10-01 DIAGNOSIS — R90.89 ABNORMAL BRAIN MRI: Primary | ICD-10-CM

## 2018-10-01 RX ORDER — TRAMADOL HYDROCHLORIDE 50 MG/1
TABLET ORAL
Qty: 42 TABLET | Refills: 0 | Status: SHIPPED | OUTPATIENT
Start: 2018-10-01 | End: 2019-01-27 | Stop reason: SDUPTHER

## 2018-10-02 ENCOUNTER — TELEPHONE (OUTPATIENT)
Dept: RHEUMATOLOGY | Age: 53
End: 2018-10-02

## 2018-10-05 ENCOUNTER — OFFICE VISIT (OUTPATIENT)
Dept: FAMILY MEDICINE CLINIC | Age: 53
End: 2018-10-05
Payer: COMMERCIAL

## 2018-10-05 VITALS
OXYGEN SATURATION: 98 % | WEIGHT: 267 LBS | HEIGHT: 73 IN | HEART RATE: 72 BPM | RESPIRATION RATE: 16 BRPM | DIASTOLIC BLOOD PRESSURE: 76 MMHG | BODY MASS INDEX: 35.39 KG/M2 | SYSTOLIC BLOOD PRESSURE: 118 MMHG

## 2018-10-05 DIAGNOSIS — E34.9 TESTOSTERONE DEFICIENCY: ICD-10-CM

## 2018-10-05 DIAGNOSIS — E78.00 HYPERCHOLESTEREMIA: ICD-10-CM

## 2018-10-05 DIAGNOSIS — M35.3 POLYMYALGIA RHEUMATICA (HCC): ICD-10-CM

## 2018-10-05 DIAGNOSIS — M79.10 MYALGIA: Primary | ICD-10-CM

## 2018-10-05 DIAGNOSIS — R97.20 ELEVATED PSA MEASUREMENT: ICD-10-CM

## 2018-10-05 DIAGNOSIS — R53.83 FATIGUE, UNSPECIFIED TYPE: ICD-10-CM

## 2018-10-05 DIAGNOSIS — R29.90 NEUROLOGICAL SYMPTOMS: ICD-10-CM

## 2018-10-05 DIAGNOSIS — R90.89 ABNORMAL BRAIN MRI: ICD-10-CM

## 2018-10-05 LAB
PROSTATE SPECIFIC ANTIGEN: 0.45 NG/ML (ref 0–4)
TOTAL CK: 109 U/L (ref 39–308)

## 2018-10-05 PROCEDURE — 99214 OFFICE O/P EST MOD 30 MIN: CPT | Performed by: NURSE PRACTITIONER

## 2018-10-05 RX ORDER — ONDANSETRON 8 MG/1
8 TABLET, ORALLY DISINTEGRATING ORAL EVERY 8 HOURS PRN
Qty: 20 TABLET | Refills: 2 | Status: SHIPPED | OUTPATIENT
Start: 2018-10-05 | End: 2018-12-14 | Stop reason: SDUPTHER

## 2018-10-05 NOTE — PROGRESS NOTES
Samantha Arvizu  50 y.o. male    1965      CC: Low energy, muscle pain. Chief Complaint   Patient presents with    Fatigue     PT CO LOW ENERGY LEVELS AND PAIN IN MUSCLES, ONGOING SINCE JUNE. WIFE HAS SENT [employer] ABOUT THIS. HAS SEEN A RHEUMATOLOGIST AND GOES THURS TO SEE A NEUROLOGIST.  Injections     DISCUSS FLU SHOT- CURRENTLY ON PREDNISONE. HPI   MRI OF BRAIN ABNORMAL  Has appt Dr Grant Mena - On Tuesday. Has been seeing [facility], as well. Optic nerves look fine and will be giving them the MRI results. Want to rule out Lyme here. No signs of glaucoma. But will have to be off prednisone to test.   She does think is giant cell arteritis and polymyalgia rheumatica. Had prednisone prior to biopsy. He was sick after trim to [facility]. Has muscle spasms. Has feeling like being squeezed too tight. Worse if laying for a while or sitting for a while. Is around the top of abdomen around rib cage. A squeezing and sharp pain and pressure. Pain in the back of the head at base of the neck. The left lateral thigh goes numb - when stands at work - when wearing belt. Fatigue, mood is different. Pressure and pain with urination and frequency - wants to be sure that PSA is not rising. Has not had much energy with the prednisone - has had overall malaise. Has been on the crestor restarted 1 within a month. No worse pain with that. Testosterone  1 cc Every 2 weeks     Allergies   Allergen Reactions    Dye  [Barium-Containing Compounds]      Other reaction(s): sneezing    Iodine Other (See Comments)     IVP dye reaction. Could not stop sneezing. Used a second time, and took benadryl prior and no issues.  Lisinopril Other (See Comments)     Cough         Physical  Examination    Physical Exam   Constitutional: He is oriented to person, place, and time. He appears distressed (low energy levels headaches. ). HENT:   Head: Normocephalic.    Cardiovascular:

## 2018-10-08 LAB
LYME (B. BURGDORFERI) AB IGG WB: NEGATIVE
LYME AB IGM BY WB:: NEGATIVE

## 2018-10-09 LAB
SEX HORMONE BINDING GLOBULIN: 13 NMOL/L (ref 11–80)
TESTOSTERONE FREE-NONMALE: 94.8 PG/ML (ref 47–244)
TESTOSTERONE TOTAL: 315 NG/DL (ref 220–1000)

## 2018-10-15 DIAGNOSIS — E78.00 HYPERCHOLESTEREMIA: ICD-10-CM

## 2018-10-15 LAB
CHOLESTEROL, TOTAL: 205 MG/DL (ref 0–199)
HDLC SERPL-MCNC: 64 MG/DL (ref 40–60)
LDL CHOLESTEROL CALCULATED: 117 MG/DL
TRIGL SERPL-MCNC: 121 MG/DL (ref 0–150)
VLDLC SERPL CALC-MCNC: 24 MG/DL

## 2018-10-18 ENCOUNTER — PATIENT MESSAGE (OUTPATIENT)
Dept: FAMILY MEDICINE CLINIC | Age: 53
End: 2018-10-18

## 2018-10-19 ENCOUNTER — PATIENT MESSAGE (OUTPATIENT)
Dept: FAMILY MEDICINE CLINIC | Age: 53
End: 2018-10-19

## 2018-10-19 DIAGNOSIS — R73.9 BLOOD GLUCOSE ELEVATED: Primary | ICD-10-CM

## 2018-10-19 DIAGNOSIS — E66.01 CLASS 2 SEVERE OBESITY DUE TO EXCESS CALORIES WITH SERIOUS COMORBIDITY AND BODY MASS INDEX (BMI) OF 35.0 TO 35.9 IN ADULT (HCC): Primary | ICD-10-CM

## 2018-10-22 PROBLEM — E66.01 CLASS 2 SEVERE OBESITY DUE TO EXCESS CALORIES WITH SERIOUS COMORBIDITY AND BODY MASS INDEX (BMI) OF 35.0 TO 35.9 IN ADULT (HCC): Status: ACTIVE | Noted: 2018-10-22

## 2018-10-22 PROBLEM — E66.812 CLASS 2 SEVERE OBESITY DUE TO EXCESS CALORIES WITH SERIOUS COMORBIDITY AND BODY MASS INDEX (BMI) OF 35.0 TO 35.9 IN ADULT: Status: ACTIVE | Noted: 2018-10-22

## 2018-10-22 RX ORDER — PHENTERMINE HYDROCHLORIDE 37.5 MG/1
37.5 CAPSULE ORAL EVERY MORNING
Qty: 30 CAPSULE | Refills: 0 | Status: SHIPPED | OUTPATIENT
Start: 2018-10-22 | End: 2018-11-21

## 2018-10-23 ENCOUNTER — TELEPHONE (OUTPATIENT)
Dept: FAMILY MEDICINE CLINIC | Age: 53
End: 2018-10-23

## 2018-10-30 ENCOUNTER — TELEPHONE (OUTPATIENT)
Dept: INTERNAL MEDICINE CLINIC | Age: 53
End: 2018-10-30

## 2018-11-01 ENCOUNTER — OFFICE VISIT (OUTPATIENT)
Dept: RHEUMATOLOGY | Age: 53
End: 2018-11-01
Payer: COMMERCIAL

## 2018-11-01 VITALS
BODY MASS INDEX: 35.12 KG/M2 | HEART RATE: 73 BPM | TEMPERATURE: 97.5 F | SYSTOLIC BLOOD PRESSURE: 127 MMHG | WEIGHT: 265 LBS | DIASTOLIC BLOOD PRESSURE: 87 MMHG | HEIGHT: 73 IN

## 2018-11-01 DIAGNOSIS — M31.6 TEMPORAL ARTERITIS (HCC): ICD-10-CM

## 2018-11-01 DIAGNOSIS — M35.3 POLYMYALGIA RHEUMATICA (HCC): ICD-10-CM

## 2018-11-01 DIAGNOSIS — Z51.11 MAINTENANCE CHEMOTHERAPY: ICD-10-CM

## 2018-11-01 DIAGNOSIS — M31.6 TEMPORAL ARTERITIS (HCC): Primary | ICD-10-CM

## 2018-11-01 DIAGNOSIS — Z79.52 LONG TERM (CURRENT) USE OF SYSTEMIC STEROIDS: ICD-10-CM

## 2018-11-01 LAB
ALT SERPL-CCNC: 33 U/L (ref 10–40)
AST SERPL-CCNC: 25 U/L (ref 15–37)
BASOPHILS ABSOLUTE: 0 K/UL (ref 0–0.2)
BASOPHILS RELATIVE PERCENT: 0.5 %
C-REACTIVE PROTEIN: 2.7 MG/L (ref 0–5.1)
CREAT SERPL-MCNC: 1.2 MG/DL (ref 0.9–1.3)
EOSINOPHILS ABSOLUTE: 0.1 K/UL (ref 0–0.6)
EOSINOPHILS RELATIVE PERCENT: 1 %
GFR AFRICAN AMERICAN: >60
GFR NON-AFRICAN AMERICAN: >60
HCT VFR BLD CALC: 39.3 % (ref 40.5–52.5)
HEMOGLOBIN: 13.5 G/DL (ref 13.5–17.5)
LYMPHOCYTES ABSOLUTE: 1 K/UL (ref 1–5.1)
LYMPHOCYTES RELATIVE PERCENT: 18.8 %
MCH RBC QN AUTO: 33.1 PG (ref 26–34)
MCHC RBC AUTO-ENTMCNC: 34.3 G/DL (ref 31–36)
MCV RBC AUTO: 96.5 FL (ref 80–100)
MONOCYTES ABSOLUTE: 0.6 K/UL (ref 0–1.3)
MONOCYTES RELATIVE PERCENT: 11.9 %
NEUTROPHILS ABSOLUTE: 3.5 K/UL (ref 1.7–7.7)
NEUTROPHILS RELATIVE PERCENT: 67.8 %
PDW BLD-RTO: 16.1 % (ref 12.4–15.4)
PLATELET # BLD: 255 K/UL (ref 135–450)
PMV BLD AUTO: 7.4 FL (ref 5–10.5)
RBC # BLD: 4.07 M/UL (ref 4.2–5.9)
SEDIMENTATION RATE, ERYTHROCYTE: 10 MM/HR (ref 0–20)
WBC # BLD: 5.2 K/UL (ref 4–11)

## 2018-11-01 PROCEDURE — 99214 OFFICE O/P EST MOD 30 MIN: CPT | Performed by: INTERNAL MEDICINE

## 2018-11-01 RX ORDER — PREDNISONE 1 MG/1
TABLET ORAL
Qty: 77 TABLET | Refills: 1 | Status: SHIPPED | OUTPATIENT
Start: 2018-11-01 | End: 2019-02-21 | Stop reason: SDUPTHER

## 2018-11-01 NOTE — PROGRESS NOTES
and his ocular pressure is under control. He is also on Fosamax. He denies any side effects with methotrexate or Fosamax. He denies any recent fevers or infections. He had bilateral temporal artery biopsy which was unremarkable. He was on prednisone for few weeks before the biopsy was done. HPI  ROS    REVIEW OF SYSTEMS: sleep disturbance  Constitutional: No unanticipated weight loss   Integumentary: No rash, photosensitivity, malar rash, livedo reticularis, alopecia and Raynaud's symptoms, sclerodactyly, skin tightening  Eyes: negative for dry eyes, visual disturbance and persistent redness, discharge from eyes   ENT: - No  loss of hearing, vertigo, or recurrent ear infections.  - No history of nasal/oral ulcers. - No history of sicca symptoms. Cardiovascular: No history of pericarditis, chest pain or murmur or palpitations  Respiratory: No cough or history of interstitial lung disease. No history of pleurisy. No history of tuberculosis or atypical infections. Gastrointestinal: No history of esophageal dysmotility. No inflammatory bowel disease. Genitourinary: No history renal disease, miscarriages. Hematologic/Lymphatic: No abnormal bruising or bleeding, blood clots or swollen lymph nodes. Neurological: No history seizure or focal weakness. No history of neuropathies, paresthesias or hyperesthesias, facial droop  Psychiatric: No history of bipolar disease, anxiety, depression  Endocrine: Denies any thyroid / parathyroid disease and osteoporosis  Allergic/Immunologic: No nasal congestion or hives. I have reviewed patients Past medical History, Social History and Family History as mentioned in her chart and this remains unchanged from previous. Past Medical History:   Diagnosis Date    Asthma     Colon polyps 06/21/2018    COLONOSCOPY, DR CARTER GARCIA, CECUM POLYP TUBULAR ADENOMAS.  Gastritis 06/21/2018    EGD, DR CARTER GARCIA, MILD CHRONIC GASTRITIS.     GERD (gastroesophageal reflux disease)     Hyperlipidemia     Hyperplastic rectal polyp 06/21/2018    COLONOSCOPY, DR CARTER GARCIA, HYPERPLASTIC POLYP RECTAL.  Hypertension     Migraine     Pneumonia     x 3    Testosterone deficiency 4/6/2017     Past Surgical History:   Procedure Laterality Date    CHOLECYSTECTOMY, LAPAROSCOPIC  8-11-11    COLONOSCOPY  06/21/2018    COLONOSCOPY, DR CARTER GARCIA, COLON POLYPS X2, RECTAL POLYP X1.    NASAL SEPTUM SURGERY      and palatoplasty     Social History     Social History    Marital status:      Spouse name: N/A    Number of children: N/A    Years of education: N/A     Occupational History          Social History Main Topics    Smoking status: Never Smoker    Smokeless tobacco: Never Used    Alcohol use Yes      Comment: rare    Drug use: No    Sexual activity: Not on file     Other Topics Concern    Not on file     Social History Narrative    [employer] 4x/wk. 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 Family History   Problem Relation Age of Onset    High Blood Pressure Mother     Cancer Father         testicular, multiple myeloma    Heart Attack Father 47        CABG 4V    Other Father         MRSA    No Known Problems Brother     Early Death Maternal Uncle          PHYSICAL EXAM   Vitals:    11/01/18 1030   BP: 127/87   Site: Right Upper Arm   Pulse: 73   Temp: 97.5 °F (36.4 °C)   Weight: 265 lb (120.2 kg)   Height: 6' 1\" (1.854 m)     Physical Exam  Constitutional:  Well developed, well nourished, no acute distress, non-toxic appearance   Musculoskeletal:    Ambulates without assistance, normal gait  Neck: Full ROM, no tenderness, supple   Upper Extremities        Shoulder: FROM        Elbow:  Full ROM, no synovitis, no deformity        Wrist: Full ROM, no synovitis, no deformity, no ulnar deviation        Fingers: No sclerodactly, no active raynaud's, no ulcers.              MCPs: No
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
## 2018-11-20 DIAGNOSIS — Z79.52 LONG TERM (CURRENT) USE OF SYSTEMIC STEROIDS: ICD-10-CM

## 2018-11-20 RX ORDER — ALENDRONATE SODIUM 70 MG/1
70 TABLET ORAL
Qty: 4 TABLET | Refills: 1 | Status: SHIPPED | OUTPATIENT
Start: 2018-11-20 | End: 2019-04-01

## 2018-12-03 ENCOUNTER — PATIENT MESSAGE (OUTPATIENT)
Dept: FAMILY MEDICINE CLINIC | Age: 53
End: 2018-12-03

## 2018-12-06 DIAGNOSIS — M31.6 TEMPORAL ARTERITIS (HCC): ICD-10-CM

## 2018-12-06 DIAGNOSIS — M35.3 POLYMYALGIA RHEUMATICA (HCC): ICD-10-CM

## 2018-12-12 ENCOUNTER — OFFICE VISIT (OUTPATIENT)
Dept: FAMILY MEDICINE CLINIC | Age: 53
End: 2018-12-12
Payer: COMMERCIAL

## 2018-12-12 VITALS
SYSTOLIC BLOOD PRESSURE: 138 MMHG | BODY MASS INDEX: 35.23 KG/M2 | HEIGHT: 73 IN | WEIGHT: 265.8 LBS | DIASTOLIC BLOOD PRESSURE: 86 MMHG | HEART RATE: 72 BPM | OXYGEN SATURATION: 97 %

## 2018-12-12 DIAGNOSIS — M31.6 TEMPORAL ARTERITIS (HCC): ICD-10-CM

## 2018-12-12 DIAGNOSIS — E55.9 VITAMIN D DEFICIENCY: ICD-10-CM

## 2018-12-12 DIAGNOSIS — R59.1 LYMPHADENOPATHY OF HEAD AND NECK: ICD-10-CM

## 2018-12-12 DIAGNOSIS — N41.0 ACUTE PROSTATITIS: ICD-10-CM

## 2018-12-12 DIAGNOSIS — M35.3 POLYMYALGIA RHEUMATICA (HCC): Primary | ICD-10-CM

## 2018-12-12 DIAGNOSIS — R53.83 FATIGUE, UNSPECIFIED TYPE: ICD-10-CM

## 2018-12-12 DIAGNOSIS — E66.01 CLASS 2 SEVERE OBESITY DUE TO EXCESS CALORIES WITH SERIOUS COMORBIDITY AND BODY MASS INDEX (BMI) OF 35.0 TO 35.9 IN ADULT (HCC): ICD-10-CM

## 2018-12-12 LAB
A/G RATIO: 2.7 (ref 1.1–2.2)
ALBUMIN SERPL-MCNC: 4.8 G/DL (ref 3.4–5)
ALP BLD-CCNC: 66 U/L (ref 40–129)
ALT SERPL-CCNC: 34 U/L (ref 10–40)
ANION GAP SERPL CALCULATED.3IONS-SCNC: 17 MMOL/L (ref 3–16)
ANISOCYTOSIS: ABNORMAL
AST SERPL-CCNC: 21 U/L (ref 15–37)
ATYPICAL LYMPHOCYTE RELATIVE PERCENT: 2 % (ref 0–6)
BANDED NEUTROPHILS RELATIVE PERCENT: 2 % (ref 0–7)
BASOPHILS ABSOLUTE: 0 K/UL (ref 0–0.2)
BASOPHILS RELATIVE PERCENT: 1 %
BILIRUB SERPL-MCNC: 0.9 MG/DL (ref 0–1)
BUN BLDV-MCNC: 18 MG/DL (ref 7–20)
C-REACTIVE PROTEIN: 2.2 MG/L (ref 0–5.1)
CALCIUM SERPL-MCNC: 9.4 MG/DL (ref 8.3–10.6)
CHLORIDE BLD-SCNC: 102 MMOL/L (ref 99–110)
CO2: 25 MMOL/L (ref 21–32)
CREAT SERPL-MCNC: 1.3 MG/DL (ref 0.9–1.3)
EOSINOPHILS ABSOLUTE: 0 K/UL (ref 0–0.6)
EOSINOPHILS RELATIVE PERCENT: 1 %
FOLATE: 12.18 NG/ML (ref 4.78–24.2)
GFR AFRICAN AMERICAN: >60
GFR NON-AFRICAN AMERICAN: 58
GLOBULIN: 1.8 G/DL
GLUCOSE BLD-MCNC: 89 MG/DL (ref 70–99)
HCT VFR BLD CALC: 41.3 % (ref 40.5–52.5)
HEMOGLOBIN: 14.1 G/DL (ref 13.5–17.5)
LYMPHOCYTES ABSOLUTE: 1.2 K/UL (ref 1–5.1)
LYMPHOCYTES RELATIVE PERCENT: 23 %
MACROCYTES: ABNORMAL
MCH RBC QN AUTO: 34 PG (ref 26–34)
MCHC RBC AUTO-ENTMCNC: 34.2 G/DL (ref 31–36)
MCV RBC AUTO: 99.6 FL (ref 80–100)
MONOCYTES ABSOLUTE: 0.6 K/UL (ref 0–1.3)
MONOCYTES RELATIVE PERCENT: 13 %
MYELOCYTE PERCENT: 2 %
NEUTROPHILS ABSOLUTE: 2.8 K/UL (ref 1.7–7.7)
NEUTROPHILS RELATIVE PERCENT: 56 %
PDW BLD-RTO: 15.4 % (ref 12.4–15.4)
PLATELET # BLD: 243 K/UL (ref 135–450)
PMV BLD AUTO: 7.8 FL (ref 5–10.5)
POTASSIUM SERPL-SCNC: 4.4 MMOL/L (ref 3.5–5.1)
PROSTATE SPECIFIC ANTIGEN: 0.43 NG/ML (ref 0–4)
RBC # BLD: 4.15 M/UL (ref 4.2–5.9)
SEDIMENTATION RATE, ERYTHROCYTE: 12 MM/HR (ref 0–20)
SLIDE REVIEW: ABNORMAL
SODIUM BLD-SCNC: 144 MMOL/L (ref 136–145)
TOTAL CK: 128 U/L (ref 39–308)
TOTAL PROTEIN: 6.6 G/DL (ref 6.4–8.2)
TSH REFLEX: 1.62 UIU/ML (ref 0.27–4.2)
VITAMIN B-12: 1230 PG/ML (ref 211–911)
VITAMIN D 25-HYDROXY: 33.9 NG/ML
WBC # BLD: 4.6 K/UL (ref 4–11)

## 2018-12-12 PROCEDURE — 99214 OFFICE O/P EST MOD 30 MIN: CPT | Performed by: NURSE PRACTITIONER

## 2018-12-12 RX ORDER — SULFAMETHOXAZOLE AND TRIMETHOPRIM 800; 160 MG/1; MG/1
1 TABLET ORAL 2 TIMES DAILY
Qty: 60 TABLET | Refills: 0 | Status: SHIPPED | OUTPATIENT
Start: 2018-12-12 | End: 2018-12-17

## 2018-12-12 RX ORDER — PHENTERMINE HYDROCHLORIDE 37.5 MG/1
37.5 TABLET ORAL
Qty: 30 TABLET | Refills: 0 | Status: SHIPPED | OUTPATIENT
Start: 2018-12-12 | End: 2019-01-11

## 2018-12-12 NOTE — PROGRESS NOTES
Italia Henao  50 y.o. male    1965      CC: Follow up autoimmune issues, weight mgmt. Chief Complaint   Patient presents with    Other     ROUTINE FOLLOW UP- STATES NEEDS LABS DONE TODAY. HE IS NOT FASTING.  Weight Loss     ROUTINE WEIGHT LOSS FOLLOW UP FOR [facility]IP[facility], SPOKE TO PHARMACY THEY STATED HE SHOULD BE ABLE TO FILL TODAY EVEN THOUGH LAST FILLED 10/23/18      HPI     AS TAPERING OFF THE PREDNISONE, HE HAS BEEN FEELING WORSE. Body aches, nausea, dizziness, pain and that is causing trouble sleeping. Was not able to sleep yesterday prior to going in to work. Left post auricular node was a hard knot - worse on the left the day before yesterday. Started as eating. Because everything hurts, the mass on collarbone, is painful to touch - worse yesterday. Cough started up. Had sinus infection on the last MRI. Has random cough without PND feeling. Did have some constipation - regular go, but not as easy - was dark and ribbon-like. Really started feeling bad a couple weeks ago. Hit the snooze button. A few times this am.  Pain is like was before. All over, - hips shoulders, in the ribcage and to the back. The shoulder and hip pain did resolve with the prednisone. But it is back now. The ribcage pain was still there when the prednisone was higher. But was reduced. It is worse if standing up. Feels like squeezing from the inside. Has bags under eyes and the temples had started bulging with the prednisone. Current dose is 10 mg of prednisone, but is supposed to be going down to 5 mg. Has trouble going up and down steps. He still felt ok at 20 mg of the prednisone. He was coughing yesterday and then got dizzy and then did not remember everything after that, but he fell down and hit the door knob in his back. When he bends down he will see white stars when he comes back up. Has been staying hydrated. BP up today but from low sleep - 10 hours out of  48 hours.    Had urinary shoulders   Lymphadenopathy:        Head (right side): Posterior auricular adenopathy present. No submental, no submandibular and no tonsillar adenopathy present. Head (left side): Posterior auricular adenopathy present. No submental, no submandibular and no tonsillar adenopathy present. He has no cervical adenopathy. Large post auricular nodes - left > right and tender. Neurological: He is alert and oriented to person, place, and time. Skin: Skin is warm and dry. No rash noted. He is not diaphoretic. Lipoma left chest wall. Psychiatric: His behavior is normal. Judgment and thought content normal.   Very tired appearing   Nursing note and vitals reviewed. Has overall myalgias. Vitals:    12/12/18 1148   BP: 138/86   Site: Left Upper Arm   Position: Sitting   Cuff Size: Large Adult   Pulse: 72   SpO2: 97%   Weight: 265 lb 12.8 oz (120.6 kg)   Height: 6' 1\" (1.854 m)     Body mass index is 35.07 kg/m². Wt Readings from Last 3 Encounters:   12/12/18 265 lb 12.8 oz (120.6 kg)   11/01/18 265 lb (120.2 kg)   10/05/18 267 lb (121.1 kg)     BP Readings from Last 3 Encounters:   12/12/18 138/86   11/01/18 127/87   10/05/18 118/76        No results found for this visit on 12/12/18. Assessment     Diagnosis Orders   1. Polymyalgia rheumatica (HCC)  CBC Auto Differential    Comprehensive Metabolic Panel    TSH with Reflex    C-Reactive Protein    CK    Sedimentation Rate    CBC Auto Differential    Comprehensive Metabolic Panel    TSH with Reflex    C-Reactive Protein    CK    Sedimentation Rate   2. Temporal arteritis (HCC)  CBC Auto Differential    Comprehensive Metabolic Panel    TSH with Reflex    C-Reactive Protein    CK    Sedimentation Rate    CBC Auto Differential    Comprehensive Metabolic Panel    TSH with Reflex    C-Reactive Protein    CK    Sedimentation Rate   3.  Fatigue, unspecified type  CBC Auto Differential    Comprehensive Metabolic Panel    TSH with Reflex

## 2018-12-13 ENCOUNTER — TELEPHONE (OUTPATIENT)
Dept: RHEUMATOLOGY | Age: 53
End: 2018-12-13

## 2018-12-14 ENCOUNTER — PATIENT MESSAGE (OUTPATIENT)
Dept: FAMILY MEDICINE CLINIC | Age: 53
End: 2018-12-14

## 2018-12-14 RX ORDER — ONDANSETRON 8 MG/1
8 TABLET, ORALLY DISINTEGRATING ORAL EVERY 8 HOURS PRN
Qty: 20 TABLET | Refills: 2 | Status: SHIPPED | OUTPATIENT
Start: 2018-12-14 | End: 2019-04-10 | Stop reason: SDUPTHER

## 2018-12-17 ENCOUNTER — PATIENT MESSAGE (OUTPATIENT)
Dept: FAMILY MEDICINE CLINIC | Age: 53
End: 2018-12-17

## 2018-12-17 RX ORDER — AMOXICILLIN AND CLAVULANATE POTASSIUM 875; 125 MG/1; MG/1
1 TABLET, FILM COATED ORAL 2 TIMES DAILY WITH MEALS
Qty: 20 TABLET | Refills: 0 | Status: SHIPPED | OUTPATIENT
Start: 2018-12-17 | End: 2018-12-27

## 2018-12-18 ENCOUNTER — PATIENT MESSAGE (OUTPATIENT)
Dept: FAMILY MEDICINE CLINIC | Age: 53
End: 2018-12-18

## 2018-12-18 DIAGNOSIS — R79.9 ABNORMAL BLOOD SMEAR: Primary | ICD-10-CM

## 2018-12-20 ENCOUNTER — OFFICE VISIT (OUTPATIENT)
Dept: RHEUMATOLOGY | Age: 53
End: 2018-12-20
Payer: COMMERCIAL

## 2018-12-20 VITALS
WEIGHT: 256 LBS | HEART RATE: 74 BPM | DIASTOLIC BLOOD PRESSURE: 88 MMHG | BODY MASS INDEX: 33.93 KG/M2 | HEIGHT: 73 IN | TEMPERATURE: 98.2 F | SYSTOLIC BLOOD PRESSURE: 139 MMHG

## 2018-12-20 DIAGNOSIS — Z51.11 MAINTENANCE CHEMOTHERAPY: ICD-10-CM

## 2018-12-20 DIAGNOSIS — R59.1 LYMPHADENOPATHY: ICD-10-CM

## 2018-12-20 DIAGNOSIS — M35.3 POLYMYALGIA RHEUMATICA (HCC): ICD-10-CM

## 2018-12-20 DIAGNOSIS — M31.6 TEMPORAL ARTERITIS (HCC): Primary | ICD-10-CM

## 2018-12-20 DIAGNOSIS — M31.6 TEMPORAL ARTERITIS (HCC): ICD-10-CM

## 2018-12-20 DIAGNOSIS — Z79.52 LONG TERM (CURRENT) USE OF SYSTEMIC STEROIDS: ICD-10-CM

## 2018-12-20 LAB
BASOPHILS ABSOLUTE: 0 K/UL (ref 0–0.2)
BASOPHILS RELATIVE PERCENT: 0.4 %
EOSINOPHILS ABSOLUTE: 0 K/UL (ref 0–0.6)
EOSINOPHILS RELATIVE PERCENT: 0.5 %
HCT VFR BLD CALC: 42.8 % (ref 40.5–52.5)
HEMOGLOBIN: 14.7 G/DL (ref 13.5–17.5)
LYMPHOCYTES ABSOLUTE: 0.5 K/UL (ref 1–5.1)
LYMPHOCYTES RELATIVE PERCENT: 6.7 %
MCH RBC QN AUTO: 34.2 PG (ref 26–34)
MCHC RBC AUTO-ENTMCNC: 34.4 G/DL (ref 31–36)
MCV RBC AUTO: 99.3 FL (ref 80–100)
MONOCYTES ABSOLUTE: 0.5 K/UL (ref 0–1.3)
MONOCYTES RELATIVE PERCENT: 6.7 %
NEUTROPHILS ABSOLUTE: 6.2 K/UL (ref 1.7–7.7)
NEUTROPHILS RELATIVE PERCENT: 85.7 %
PDW BLD-RTO: 14.8 % (ref 12.4–15.4)
PLATELET # BLD: 275 K/UL (ref 135–450)
PMV BLD AUTO: 8 FL (ref 5–10.5)
RBC # BLD: 4.3 M/UL (ref 4.2–5.9)
WBC # BLD: 7.2 K/UL (ref 4–11)

## 2018-12-20 PROCEDURE — 99214 OFFICE O/P EST MOD 30 MIN: CPT | Performed by: INTERNAL MEDICINE

## 2018-12-20 RX ORDER — BRIMONIDINE TARTRATE, TIMOLOL MALEATE 2; 5 MG/ML; MG/ML
1 SOLUTION/ DROPS OPHTHALMIC EVERY 12 HOURS
COMMUNITY
End: 2019-06-06

## 2018-12-20 NOTE — PROGRESS NOTES
shoulders and hips, pain in the joints after decreasing her prednisone to 10 MG daily. He gets occasional headache lasting for half an hour. He denies vision changes, jaw pain or extremity weakness. He has been stressed out lately and not sleeping well and feels that could be contributing to the pain. He is also on Fosamax. He denies any side effects with methotrexate or Fosamax. He denies any recent fevers or infections.  -He has bilateral cervical lymphadenopathy. CBC showed 2% myelocytes and 1% macrocytes. He scheduled to see the oncologist.  He denies any fever, weight loss or night sweats. He had bilateral temporal artery biopsy which was unremarkable. He was on prednisone for few weeks before the biopsy was done. HPI  ROS    REVIEW OF SYSTEMS: sleep disturbance  Constitutional: No unanticipated weight loss   Integumentary: No rash, photosensitivity, malar rash, livedo reticularis, alopecia and Raynaud's symptoms, sclerodactyly, skin tightening  Eyes: negative for dry eyes, visual disturbance and persistent redness, discharge from eyes   ENT: - No  loss of hearing, vertigo, or recurrent ear infections.  - No history of nasal/oral ulcers. - No history of sicca symptoms. Cardiovascular: No history of pericarditis, chest pain or murmur or palpitations  Respiratory: No cough or history of interstitial lung disease. No history of pleurisy. No history of tuberculosis or atypical infections. Gastrointestinal: No history of esophageal dysmotility. No inflammatory bowel disease. Genitourinary: No history renal disease, miscarriages. Hematologic/Lymphatic: No abnormal bruising or bleeding, blood clots or swollen lymph nodes. Neurological: No history seizure or focal weakness.  No history of neuropathies, paresthesias or hyperesthesias, facial droop  Psychiatric: No history of bipolar disease, anxiety, depression  Endocrine: Denies any thyroid / parathyroid disease and Physical Exam  Constitutional:  Well developed, well nourished, no acute distress, non-toxic appearance   Musculoskeletal:    Ambulates without assistance, normal gait  Neck: Full ROM, no tenderness, supple   Upper Extremities        Shoulder: FROM        Elbow:  Full ROM, no synovitis, no deformity        Wrist: Full ROM, no synovitis, no deformity, no ulnar deviation        Fingers: No sclerodactly, no active raynaud's, no ulcers. MCPs: No synovitis, no deformity             PIPs:  No synovitis, no deformity             DIPs: No synovitis, no deformity             Nails: No pitting, no telangiectasias. Lower Extremities        Hip: Full ROM, no tenderness to palpation        Knee: Full ROM, bilateral knee crepitus+        Ankle: Full ROM, no swelling or erythema        MTPs: No swelling or erythema  Back- no tenderness. Eyes:  PERRL, extra ocular movements intact, conjunctiva normal   HEENT:  Atraumatic, normocephalic, external ears normal, oropharynx moist, no pharyngeal exudates. No scalp tenderness, temporal artery not palpable, bilaterally Linear scar on the temples  Respiratory:  No respiratory distress  GI:  Soft, nondistended, normal bowel sounds, nontender, no organomegaly, no mass, no rebound, no guarding   :  No costovertebral angle tenderness   Integument:  Well hydrated, no rash or telangiectasias  Lymphatic:  No lymphadenopathy noted   Neurologic:   Alert & oriented x 3, CN 2-12 normal, no focal deficits noted. Sensations Intact. Muscle strength 5/5 proximally and distally in upper and lower extremities.    Psychiatric:  Speech and behavior appropriate           LABS AND IMAGING  Outside data reviewed and in HPI    Lab Results   Component Value Date    WBC 4.6 12/12/2018    RBC 4.15 12/12/2018    HGB 14.1 12/12/2018    HCT 41.3 12/12/2018     12/12/2018    MCV 99.6 12/12/2018    MCH 34.0 12/12/2018    MCHC 34.2 12/12/2018    RDW 15.4 12/12/2018    SEGSPCT 73.0 08/18/2011

## 2018-12-21 ENCOUNTER — TELEPHONE (OUTPATIENT)
Dept: RHEUMATOLOGY | Age: 53
End: 2018-12-21

## 2018-12-21 DIAGNOSIS — R74.8 ELEVATED LIVER ENZYMES: Primary | ICD-10-CM

## 2018-12-21 LAB
A/G RATIO: 2.6 (ref 1.1–2.2)
ALBUMIN SERPL-MCNC: 4.4 G/DL (ref 3.1–4.9)
ALBUMIN SERPL-MCNC: 5.1 G/DL (ref 3.4–5)
ALP BLD-CCNC: 71 U/L (ref 40–129)
ALPHA-1-GLOBULIN: 0.2 G/DL (ref 0.2–0.4)
ALPHA-2-GLOBULIN: 0.6 G/DL (ref 0.4–1.1)
ALT SERPL-CCNC: 42 U/L (ref 10–40)
ANION GAP SERPL CALCULATED.3IONS-SCNC: 15 MMOL/L (ref 3–16)
AST SERPL-CCNC: 25 U/L (ref 15–37)
BETA GLOBULIN: 1.2 G/DL (ref 0.9–1.6)
BILIRUB SERPL-MCNC: 1.2 MG/DL (ref 0–1)
BUN BLDV-MCNC: 14 MG/DL (ref 7–20)
CALCIUM SERPL-MCNC: 10 MG/DL (ref 8.3–10.6)
CHLORIDE BLD-SCNC: 99 MMOL/L (ref 99–110)
CO2: 25 MMOL/L (ref 21–32)
CREAT SERPL-MCNC: 1 MG/DL (ref 0.9–1.3)
GAMMA GLOBULIN: 0.8 G/DL (ref 0.6–1.8)
GFR AFRICAN AMERICAN: >60
GFR NON-AFRICAN AMERICAN: >60
GLOBULIN: 2 G/DL
GLUCOSE BLD-MCNC: 120 MG/DL (ref 70–99)
POTASSIUM SERPL-SCNC: 4.6 MMOL/L (ref 3.5–5.1)
SODIUM BLD-SCNC: 139 MMOL/L (ref 136–145)
SPE/IFE INTERPRETATION: NORMAL
TOTAL PROTEIN: 7.1 G/DL (ref 6.4–8.2)

## 2018-12-21 NOTE — TELEPHONE ENCOUNTER
Notes recorded by Madhavi Burgess on 12/21/2018 at 12:32 PM EST  LVM informing pt of message info per Dr. Dinorah Avendaño    ------    Notes recorded by Mike Gilliland MD on 12/21/2018 at 12:27 PM EST  Please call the patient and let him know that his liver enzymes are slightly elevated.  I will repeat his liver function test in one week and if persistently elevated will decrease his methotrexate dose

## 2019-01-02 ENCOUNTER — PATIENT MESSAGE (OUTPATIENT)
Dept: FAMILY MEDICINE CLINIC | Age: 54
End: 2019-01-02

## 2019-01-08 RX ORDER — LEUCOVORIN CALCIUM 5 MG/1
TABLET ORAL
Qty: 5 TABLET | Refills: 5 | Status: SHIPPED | OUTPATIENT
Start: 2019-01-08 | End: 2019-02-21

## 2019-01-11 ENCOUNTER — TELEPHONE (OUTPATIENT)
Dept: FAMILY MEDICINE CLINIC | Age: 54
End: 2019-01-11

## 2019-01-14 RX ORDER — AMLODIPINE BESYLATE 5 MG/1
TABLET ORAL
Qty: 30 TABLET | Refills: 3 | Status: SHIPPED | OUTPATIENT
Start: 2019-01-14 | End: 2019-04-02 | Stop reason: SDUPTHER

## 2019-01-16 ENCOUNTER — OFFICE VISIT (OUTPATIENT)
Dept: INFECTIOUS DISEASES | Age: 54
End: 2019-01-16
Payer: COMMERCIAL

## 2019-01-16 VITALS
HEIGHT: 73 IN | SYSTOLIC BLOOD PRESSURE: 132 MMHG | WEIGHT: 275 LBS | DIASTOLIC BLOOD PRESSURE: 88 MMHG | TEMPERATURE: 97.8 F | BODY MASS INDEX: 36.45 KG/M2

## 2019-01-16 DIAGNOSIS — R53.83 FATIGUE, UNSPECIFIED TYPE: ICD-10-CM

## 2019-01-16 DIAGNOSIS — R52 COMPLAINTS OF TOTAL BODY PAIN: ICD-10-CM

## 2019-01-16 DIAGNOSIS — M31.6 TEMPORAL ARTERITIS (HCC): ICD-10-CM

## 2019-01-16 DIAGNOSIS — M35.3 POLYMYALGIA RHEUMATICA (HCC): ICD-10-CM

## 2019-01-16 DIAGNOSIS — R53.83 FATIGUE, UNSPECIFIED TYPE: Primary | ICD-10-CM

## 2019-01-16 LAB
ALBUMIN SERPL-MCNC: 4.6 G/DL (ref 3.4–5)
ALP BLD-CCNC: 62 U/L (ref 40–129)
ALT SERPL-CCNC: 29 U/L (ref 10–40)
AST SERPL-CCNC: 20 U/L (ref 15–37)
BILIRUB SERPL-MCNC: 0.9 MG/DL (ref 0–1)
BILIRUBIN DIRECT: <0.2 MG/DL (ref 0–0.3)
BILIRUBIN, INDIRECT: NORMAL MG/DL (ref 0–1)
C-REACTIVE PROTEIN: 0.9 MG/L (ref 0–5.1)
TOTAL PROTEIN: 6.4 G/DL (ref 6.4–8.2)

## 2019-01-16 PROCEDURE — 99205 OFFICE O/P NEW HI 60 MIN: CPT | Performed by: INTERNAL MEDICINE

## 2019-01-17 LAB — TOTAL SYPHILLIS IGG/IGM: NORMAL

## 2019-01-21 LAB — BLOOD CULTURE, ROUTINE: NORMAL

## 2019-01-22 LAB — CULTURE, BLOOD 2: NORMAL

## 2019-01-23 ENCOUNTER — TELEPHONE (OUTPATIENT)
Dept: INFECTIOUS DISEASES | Age: 54
End: 2019-01-23

## 2019-01-27 DIAGNOSIS — M31.6 TEMPORAL ARTERITIS (HCC): ICD-10-CM

## 2019-01-27 DIAGNOSIS — M35.3 POLYMYALGIA RHEUMATICA (HCC): ICD-10-CM

## 2019-01-28 RX ORDER — TRAMADOL HYDROCHLORIDE 50 MG/1
TABLET ORAL
Qty: 42 TABLET | Refills: 0 | Status: SHIPPED | OUTPATIENT
Start: 2019-01-28 | End: 2019-02-04

## 2019-02-04 ENCOUNTER — PATIENT MESSAGE (OUTPATIENT)
Dept: FAMILY MEDICINE CLINIC | Age: 54
End: 2019-02-04

## 2019-02-06 ENCOUNTER — TELEPHONE (OUTPATIENT)
Dept: FAMILY MEDICINE CLINIC | Age: 54
End: 2019-02-06

## 2019-02-11 ENCOUNTER — PATIENT MESSAGE (OUTPATIENT)
Dept: FAMILY MEDICINE CLINIC | Age: 54
End: 2019-02-11

## 2019-02-14 ENCOUNTER — PATIENT MESSAGE (OUTPATIENT)
Dept: FAMILY MEDICINE CLINIC | Age: 54
End: 2019-02-14

## 2019-02-21 ENCOUNTER — OFFICE VISIT (OUTPATIENT)
Dept: RHEUMATOLOGY | Age: 54
End: 2019-02-21
Payer: COMMERCIAL

## 2019-02-21 ENCOUNTER — PATIENT MESSAGE (OUTPATIENT)
Dept: FAMILY MEDICINE CLINIC | Age: 54
End: 2019-02-21

## 2019-02-21 VITALS
SYSTOLIC BLOOD PRESSURE: 193 MMHG | HEART RATE: 65 BPM | DIASTOLIC BLOOD PRESSURE: 100 MMHG | WEIGHT: 253 LBS | HEIGHT: 73 IN | BODY MASS INDEX: 33.53 KG/M2

## 2019-02-21 DIAGNOSIS — R06.02 SOB (SHORTNESS OF BREATH): ICD-10-CM

## 2019-02-21 DIAGNOSIS — R59.1 LYMPHADENOPATHY: ICD-10-CM

## 2019-02-21 DIAGNOSIS — M31.6 TEMPORAL ARTERITIS (HCC): ICD-10-CM

## 2019-02-21 DIAGNOSIS — Z79.52 LONG TERM (CURRENT) USE OF SYSTEMIC STEROIDS: ICD-10-CM

## 2019-02-21 DIAGNOSIS — M35.3 POLYMYALGIA RHEUMATICA (HCC): Primary | ICD-10-CM

## 2019-02-21 DIAGNOSIS — M31.6 TEMPORAL ARTERITIS (HCC): Primary | ICD-10-CM

## 2019-02-21 DIAGNOSIS — M35.3 POLYMYALGIA RHEUMATICA (HCC): ICD-10-CM

## 2019-02-21 LAB
A/G RATIO: 2.4 (ref 1.1–2.2)
ALBUMIN SERPL-MCNC: 4.7 G/DL (ref 3.4–5)
ALP BLD-CCNC: 58 U/L (ref 40–129)
ALT SERPL-CCNC: 31 U/L (ref 10–40)
ANION GAP SERPL CALCULATED.3IONS-SCNC: 15 MMOL/L (ref 3–16)
AST SERPL-CCNC: 25 U/L (ref 15–37)
BASOPHILS ABSOLUTE: 0 K/UL (ref 0–0.2)
BASOPHILS RELATIVE PERCENT: 1 %
BILIRUB SERPL-MCNC: 0.9 MG/DL (ref 0–1)
BUN BLDV-MCNC: 14 MG/DL (ref 7–20)
C-REACTIVE PROTEIN: 2.5 MG/L (ref 0–5.1)
CALCIUM SERPL-MCNC: 9.4 MG/DL (ref 8.3–10.6)
CHLORIDE BLD-SCNC: 104 MMOL/L (ref 99–110)
CHOLESTEROL, TOTAL: 178 MG/DL (ref 0–199)
CO2: 24 MMOL/L (ref 21–32)
CREAT SERPL-MCNC: 1.2 MG/DL (ref 0.9–1.3)
EOSINOPHILS ABSOLUTE: 0.1 K/UL (ref 0–0.6)
EOSINOPHILS RELATIVE PERCENT: 1.9 %
GFR AFRICAN AMERICAN: >60
GFR NON-AFRICAN AMERICAN: >60
GLOBULIN: 2 G/DL
GLUCOSE BLD-MCNC: 111 MG/DL (ref 70–99)
HCT VFR BLD CALC: 43.1 % (ref 40.5–52.5)
HDLC SERPL-MCNC: 56 MG/DL (ref 40–60)
HEMOGLOBIN: 14.6 G/DL (ref 13.5–17.5)
LDL CHOLESTEROL CALCULATED: 95 MG/DL
LYMPHOCYTES ABSOLUTE: 1.1 K/UL (ref 1–5.1)
LYMPHOCYTES RELATIVE PERCENT: 22.5 %
MCH RBC QN AUTO: 33.6 PG (ref 26–34)
MCHC RBC AUTO-ENTMCNC: 34 G/DL (ref 31–36)
MCV RBC AUTO: 98.9 FL (ref 80–100)
MONOCYTES ABSOLUTE: 0.6 K/UL (ref 0–1.3)
MONOCYTES RELATIVE PERCENT: 13.2 %
NEUTROPHILS ABSOLUTE: 3 K/UL (ref 1.7–7.7)
NEUTROPHILS RELATIVE PERCENT: 61.4 %
PDW BLD-RTO: 14.1 % (ref 12.4–15.4)
PLATELET # BLD: 229 K/UL (ref 135–450)
PMV BLD AUTO: 8.3 FL (ref 5–10.5)
POTASSIUM SERPL-SCNC: 4.3 MMOL/L (ref 3.5–5.1)
RBC # BLD: 4.36 M/UL (ref 4.2–5.9)
SEDIMENTATION RATE, ERYTHROCYTE: 10 MM/HR (ref 0–20)
SODIUM BLD-SCNC: 143 MMOL/L (ref 136–145)
TOTAL PROTEIN: 6.7 G/DL (ref 6.4–8.2)
TRIGL SERPL-MCNC: 135 MG/DL (ref 0–150)
VLDLC SERPL CALC-MCNC: 27 MG/DL
WBC # BLD: 4.9 K/UL (ref 4–11)

## 2019-02-21 PROCEDURE — 99214 OFFICE O/P EST MOD 30 MIN: CPT | Performed by: INTERNAL MEDICINE

## 2019-02-21 RX ORDER — PREDNISONE 10 MG/1
10 TABLET ORAL DAILY
Qty: 30 TABLET | Refills: 1 | Status: SHIPPED | OUTPATIENT
Start: 2019-02-21 | End: 2019-06-06 | Stop reason: ALTCHOICE

## 2019-02-21 RX ORDER — TRAMADOL HYDROCHLORIDE 50 MG/1
50 TABLET ORAL EVERY 4 HOURS PRN
COMMUNITY
End: 2019-03-12 | Stop reason: ALTCHOICE

## 2019-02-22 RX ORDER — HYDROCODONE BITARTRATE AND ACETAMINOPHEN 5; 325 MG/1; MG/1
1 TABLET ORAL EVERY 4 HOURS PRN
Qty: 30 TABLET | Refills: 0 | Status: SHIPPED | OUTPATIENT
Start: 2019-02-22 | End: 2019-02-27

## 2019-02-23 LAB — ANGIOTENSIN CONVERTING ENZYME: 27 U/L (ref 9–67)

## 2019-02-25 LAB
QUANTI TB GOLD PLUS: NEGATIVE
QUANTI TB1 MINUS NIL: 0 IU/ML (ref 0–0.34)
QUANTI TB2 MINUS NIL: 0.01 IU/ML (ref 0–0.34)
QUANTIFERON MITOGEN: >10 IU/ML
QUANTIFERON NIL: 0.02 IU/ML

## 2019-02-26 ENCOUNTER — OFFICE VISIT (OUTPATIENT)
Dept: RHEUMATOLOGY | Age: 54
End: 2019-02-26
Payer: COMMERCIAL

## 2019-02-26 VITALS
HEART RATE: 87 BPM | WEIGHT: 274 LBS | BODY MASS INDEX: 37.11 KG/M2 | HEIGHT: 72 IN | SYSTOLIC BLOOD PRESSURE: 126 MMHG | OXYGEN SATURATION: 98 % | DIASTOLIC BLOOD PRESSURE: 72 MMHG

## 2019-02-26 DIAGNOSIS — M31.6 TEMPORAL ARTERITIS (HCC): ICD-10-CM

## 2019-02-26 DIAGNOSIS — Z79.52 LONG TERM (CURRENT) USE OF SYSTEMIC STEROIDS: ICD-10-CM

## 2019-02-26 DIAGNOSIS — R59.1 LYMPHADENOPATHY: ICD-10-CM

## 2019-02-26 DIAGNOSIS — M25.462 PAIN AND SWELLING OF LEFT KNEE: Primary | ICD-10-CM

## 2019-02-26 DIAGNOSIS — M25.562 PAIN AND SWELLING OF LEFT KNEE: Primary | ICD-10-CM

## 2019-02-26 DIAGNOSIS — R06.02 SOB (SHORTNESS OF BREATH): ICD-10-CM

## 2019-02-26 DIAGNOSIS — M35.3 POLYMYALGIA RHEUMATICA (HCC): ICD-10-CM

## 2019-02-26 PROCEDURE — 99214 OFFICE O/P EST MOD 30 MIN: CPT | Performed by: INTERNAL MEDICINE

## 2019-02-28 ENCOUNTER — TELEPHONE (OUTPATIENT)
Dept: INTERNAL MEDICINE CLINIC | Age: 54
End: 2019-02-28

## 2019-02-28 DIAGNOSIS — M25.462 PAIN AND SWELLING OF LEFT KNEE: Primary | ICD-10-CM

## 2019-02-28 DIAGNOSIS — M25.562 PAIN AND SWELLING OF LEFT KNEE: Primary | ICD-10-CM

## 2019-03-05 ENCOUNTER — TELEPHONE (OUTPATIENT)
Dept: INTERNAL MEDICINE CLINIC | Age: 54
End: 2019-03-05

## 2019-03-06 ENCOUNTER — OFFICE VISIT (OUTPATIENT)
Dept: ORTHOPEDIC SURGERY | Age: 54
End: 2019-03-06
Payer: COMMERCIAL

## 2019-03-06 VITALS
WEIGHT: 271 LBS | HEIGHT: 72 IN | BODY MASS INDEX: 36.7 KG/M2 | HEART RATE: 66 BPM | DIASTOLIC BLOOD PRESSURE: 107 MMHG | SYSTOLIC BLOOD PRESSURE: 158 MMHG

## 2019-03-06 DIAGNOSIS — M22.2X1 PATELLOFEMORAL ARTHRALGIA OF BOTH KNEES: ICD-10-CM

## 2019-03-06 DIAGNOSIS — M71.22 POPLITEAL CYST, LEFT: Primary | ICD-10-CM

## 2019-03-06 DIAGNOSIS — M22.2X2 PATELLOFEMORAL ARTHRALGIA OF BOTH KNEES: ICD-10-CM

## 2019-03-06 DIAGNOSIS — G89.29 BILATERAL CHRONIC KNEE PAIN: ICD-10-CM

## 2019-03-06 DIAGNOSIS — M25.562 BILATERAL CHRONIC KNEE PAIN: ICD-10-CM

## 2019-03-06 DIAGNOSIS — M25.561 BILATERAL CHRONIC KNEE PAIN: ICD-10-CM

## 2019-03-06 PROCEDURE — 99203 OFFICE O/P NEW LOW 30 MIN: CPT | Performed by: PHYSICIAN ASSISTANT

## 2019-03-06 RX ORDER — HYDROCODONE BITARTRATE AND ACETAMINOPHEN 5; 325 MG/1; MG/1
1 TABLET ORAL EVERY 6 HOURS PRN
COMMUNITY
End: 2019-10-01 | Stop reason: ALTCHOICE

## 2019-03-06 RX ORDER — TESTOSTERONE CYPIONATE 200 MG/ML
INJECTION INTRAMUSCULAR
COMMUNITY
Start: 2019-01-13 | End: 2019-10-01 | Stop reason: SDUPTHER

## 2019-03-08 ENCOUNTER — HOSPITAL ENCOUNTER (OUTPATIENT)
Dept: ULTRASOUND IMAGING | Age: 54
Discharge: HOME | End: 2019-03-08
Payer: COMMERCIAL

## 2019-03-08 ENCOUNTER — TELEPHONE (OUTPATIENT)
Dept: INTERNAL MEDICINE CLINIC | Age: 54
End: 2019-03-08

## 2019-03-08 DIAGNOSIS — M71.22 POPLITEAL CYST, LEFT: ICD-10-CM

## 2019-03-08 PROCEDURE — 76882 US LMTD JT/FCL EVL NVASC XTR: CPT

## 2019-03-12 ENCOUNTER — PATIENT MESSAGE (OUTPATIENT)
Dept: FAMILY MEDICINE CLINIC | Age: 54
End: 2019-03-12

## 2019-03-12 ENCOUNTER — OFFICE VISIT (OUTPATIENT)
Dept: ENDOCRINOLOGY | Age: 54
End: 2019-03-12
Payer: COMMERCIAL

## 2019-03-12 VITALS
DIASTOLIC BLOOD PRESSURE: 87 MMHG | BODY MASS INDEX: 37.71 KG/M2 | HEART RATE: 67 BPM | RESPIRATION RATE: 16 BRPM | WEIGHT: 278.4 LBS | HEIGHT: 72 IN | OXYGEN SATURATION: 98 % | SYSTOLIC BLOOD PRESSURE: 134 MMHG

## 2019-03-12 DIAGNOSIS — E34.9 TESTOSTERONE DEFICIENCY: ICD-10-CM

## 2019-03-12 DIAGNOSIS — R79.89 ELEVATED PROLACTIN LEVEL: ICD-10-CM

## 2019-03-12 DIAGNOSIS — E23.6 EMPTY SELLA (HCC): Primary | ICD-10-CM

## 2019-03-12 DIAGNOSIS — R73.03 PREDIABETES: ICD-10-CM

## 2019-03-12 PROCEDURE — 99204 OFFICE O/P NEW MOD 45 MIN: CPT | Performed by: INTERNAL MEDICINE

## 2019-03-12 PROCEDURE — 83036 HEMOGLOBIN GLYCOSYLATED A1C: CPT | Performed by: INTERNAL MEDICINE

## 2019-03-13 RX ORDER — TESTOSTERONE CYPIONATE 200 MG/ML
200 INJECTION INTRAMUSCULAR
Qty: 10 ML | Refills: 0 | Status: SHIPPED | OUTPATIENT
Start: 2019-03-13 | End: 2019-06-06 | Stop reason: ALTCHOICE

## 2019-03-14 DIAGNOSIS — R79.89 ELEVATED PROLACTIN LEVEL: ICD-10-CM

## 2019-03-14 DIAGNOSIS — E23.6 EMPTY SELLA (HCC): ICD-10-CM

## 2019-03-14 DIAGNOSIS — R73.03 PREDIABETES: ICD-10-CM

## 2019-03-14 LAB — PROLACTIN: 14 NG/ML

## 2019-03-16 LAB
IGF-1 (INSULIN-LIKE GROWTH I): 202 NG/ML (ref 66–225)
INSULIN-LIKE GROWTH FACTOR-1 Z-SCORE: 1.5

## 2019-03-18 ENCOUNTER — PATIENT MESSAGE (OUTPATIENT)
Dept: FAMILY MEDICINE CLINIC | Age: 54
End: 2019-03-18

## 2019-03-18 ENCOUNTER — TELEPHONE (OUTPATIENT)
Dept: RHEUMATOLOGY | Age: 54
End: 2019-03-18

## 2019-03-18 ENCOUNTER — TELEPHONE (OUTPATIENT)
Dept: INTERNAL MEDICINE CLINIC | Age: 54
End: 2019-03-18

## 2019-03-18 DIAGNOSIS — M31.6 TEMPORAL ARTERITIS (HCC): ICD-10-CM

## 2019-03-18 DIAGNOSIS — M35.3 POLYMYALGIA RHEUMATICA (HCC): ICD-10-CM

## 2019-03-18 DIAGNOSIS — M31.6 TEMPORAL ARTERITIS (HCC): Primary | ICD-10-CM

## 2019-03-19 ENCOUNTER — TELEPHONE (OUTPATIENT)
Dept: INTERNAL MEDICINE CLINIC | Age: 54
End: 2019-03-19

## 2019-03-20 ENCOUNTER — TELEPHONE (OUTPATIENT)
Dept: RHEUMATOLOGY | Age: 54
End: 2019-03-20

## 2019-03-20 ENCOUNTER — OFFICE VISIT (OUTPATIENT)
Dept: RHEUMATOLOGY | Age: 54
End: 2019-03-20
Payer: COMMERCIAL

## 2019-03-20 VITALS
SYSTOLIC BLOOD PRESSURE: 128 MMHG | HEIGHT: 72 IN | BODY MASS INDEX: 37.25 KG/M2 | WEIGHT: 275 LBS | DIASTOLIC BLOOD PRESSURE: 70 MMHG | HEART RATE: 64 BPM

## 2019-03-20 DIAGNOSIS — M31.6 TEMPORAL ARTERITIS (HCC): ICD-10-CM

## 2019-03-20 DIAGNOSIS — M25.462 PAIN AND SWELLING OF LEFT KNEE: ICD-10-CM

## 2019-03-20 DIAGNOSIS — M25.562 PAIN AND SWELLING OF LEFT KNEE: ICD-10-CM

## 2019-03-20 DIAGNOSIS — M79.10 MYALGIA: ICD-10-CM

## 2019-03-20 DIAGNOSIS — R51.9 NONINTRACTABLE HEADACHE, UNSPECIFIED CHRONICITY PATTERN, UNSPECIFIED HEADACHE TYPE: ICD-10-CM

## 2019-03-20 DIAGNOSIS — R59.1 LYMPHADENOPATHY: ICD-10-CM

## 2019-03-20 DIAGNOSIS — Z79.52 LONG TERM (CURRENT) USE OF SYSTEMIC STEROIDS: ICD-10-CM

## 2019-03-20 DIAGNOSIS — M31.6 TEMPORAL ARTERITIS (HCC): Primary | ICD-10-CM

## 2019-03-20 DIAGNOSIS — M35.3 POLYMYALGIA RHEUMATICA (HCC): ICD-10-CM

## 2019-03-20 LAB
C-REACTIVE PROTEIN: 2.2 MG/L (ref 0–5.1)
SEDIMENTATION RATE, ERYTHROCYTE: 11 MM/HR (ref 0–20)
TOTAL CK: 165 U/L (ref 39–308)
TSH REFLEX FT4: 1.87 UIU/ML (ref 0.27–4.2)
VITAMIN B-12: 1385 PG/ML (ref 211–911)
VITAMIN D 25-HYDROXY: 28.5 NG/ML

## 2019-03-20 PROCEDURE — 99214 OFFICE O/P EST MOD 30 MIN: CPT | Performed by: INTERNAL MEDICINE

## 2019-03-21 RX ORDER — ERGOCALCIFEROL 1.25 MG/1
50000 CAPSULE ORAL WEEKLY
Qty: 4 CAPSULE | Refills: 2 | Status: SHIPPED | OUTPATIENT
Start: 2019-03-21 | End: 2020-03-27 | Stop reason: ALTCHOICE

## 2019-03-21 RX ORDER — CHOLECALCIFEROL (VITAMIN D3) 50 MCG
2000 TABLET ORAL DAILY
Qty: 30 TABLET | Refills: 11 | Status: SHIPPED | OUTPATIENT
Start: 2019-03-21

## 2019-04-10 ENCOUNTER — OFFICE VISIT (OUTPATIENT)
Dept: FAMILY MEDICINE CLINIC | Age: 54
End: 2019-04-10
Payer: COMMERCIAL

## 2019-04-10 VITALS
OXYGEN SATURATION: 98 % | SYSTOLIC BLOOD PRESSURE: 138 MMHG | TEMPERATURE: 98.8 F | DIASTOLIC BLOOD PRESSURE: 84 MMHG | WEIGHT: 279.6 LBS | HEIGHT: 72 IN | BODY MASS INDEX: 37.87 KG/M2 | RESPIRATION RATE: 16 BRPM | HEART RATE: 72 BPM

## 2019-04-10 DIAGNOSIS — E78.00 HYPERCHOLESTEREMIA: ICD-10-CM

## 2019-04-10 DIAGNOSIS — M35.3 POLYMYALGIA RHEUMATICA (HCC): Primary | ICD-10-CM

## 2019-04-10 DIAGNOSIS — I10 ESSENTIAL HYPERTENSION: ICD-10-CM

## 2019-04-10 DIAGNOSIS — E55.9 VITAMIN D DEFICIENCY: ICD-10-CM

## 2019-04-10 DIAGNOSIS — M31.6 TEMPORAL ARTERITIS (HCC): ICD-10-CM

## 2019-04-10 PROCEDURE — 99214 OFFICE O/P EST MOD 30 MIN: CPT | Performed by: NURSE PRACTITIONER

## 2019-04-10 RX ORDER — ONDANSETRON 8 MG/1
8 TABLET, ORALLY DISINTEGRATING ORAL EVERY 8 HOURS PRN
Qty: 20 TABLET | Refills: 2 | Status: SHIPPED | OUTPATIENT
Start: 2019-04-10 | End: 2022-03-16 | Stop reason: SDUPTHER

## 2019-04-10 ASSESSMENT — PATIENT HEALTH QUESTIONNAIRE - PHQ9
1. LITTLE INTEREST OR PLEASURE IN DOING THINGS: 0
SUM OF ALL RESPONSES TO PHQ QUESTIONS 1-9: 0
SUM OF ALL RESPONSES TO PHQ QUESTIONS 1-9: 0
SUM OF ALL RESPONSES TO PHQ9 QUESTIONS 1 & 2: 0
2. FEELING DOWN, DEPRESSED OR HOPELESS: 0

## 2019-04-10 NOTE — PROGRESS NOTES
Belén Lynn  48 y.o. male    1965      CC: FOLLOW UP MEDICATION     Chief Complaint   Patient presents with    Discuss Medications     WANTS TO  TALK ABOUT NEW MED  FROM RHEUMATOLOGIST       HPI     Seeing a new rhematologist at  - started actemra. Also on gabapentin. 300 mg 1-2 at night. Has some dizziness with 2 at night. Cannot take it prior to work as makes him too drowsy. Has pain at a 3 currently. Will have pain in the early am.  The gabapentin seems to work for about 12 hours. Feels ok for a few hours into the work day and then the pain increases again. The fatigues is still a big factor. Went on walk, and felt fine during, but really drained after. Has not attempted much activity, and will be working to increase activity over time. Has not rescheduled PFT yet. Had a mistake - was initially scheduled to see pulmonology and have testing in same day. Then had lymph node evaluate. Will have this rescheduled to evaluate the shortness of breath. The rheumatologist wanted to see him in a month after the actemra. Wife has concern about the adipex with BP and cholesterol. Did have a run of elevated BPs. Early appts after work are generally elevated. Still on prednisone 10 mg daily. Allergies   Allergen Reactions    Dye  [Barium-Containing Compounds]      Other reaction(s): sneezing    Iodine Other (See Comments)     IVP dye reaction. Could not stop sneezing. Used a second time, and took benadryl prior and no issues.  Lisinopril Other (See Comments)     Cough         Physical  Examination    Physical Exam   Constitutional: He is oriented to person, place, and time. No distress. HENT:   Head: Normocephalic. Cardiovascular: Normal rate and regular rhythm. Exam reveals no gallop and no friction rub. No murmur heard. Pulmonary/Chest: Effort normal. No accessory muscle usage. No respiratory distress. He has no decreased breath sounds. He has no wheezes.  He has no rhonchi. He has no rales. Abdominal: Soft. He exhibits no distension. There is no tenderness. Has had some nausea. Musculoskeletal: He exhibits tenderness. Pain throughout with more pain int he arms and legs with numbness in the arms and legs. Has pain in the bottoms of the feet. Has sharp pain in the left side of the head at times. No blurred vision at this time. The pain in the torso has resolved. Neurological: He is alert and oriented to person, place, and time. Skin: Skin is warm and dry. He is not diaphoretic. Psychiatric: He has a normal mood and affect. His behavior is normal. Judgment and thought content normal.   Nursing note and vitals reviewed. Vitals:    04/10/19 1416   BP: 138/84   Site: Left Upper Arm   Position: Sitting   Cuff Size: Medium Adult   Pulse: 72   Resp: 16   Temp: 98.8 °F (37.1 °C)   TempSrc: Oral   SpO2: 98%   Weight: 279 lb 9.6 oz (126.8 kg)   Height: 6' (1.829 m)     Body mass index is 37.92 kg/m². Wt Readings from Last 3 Encounters:   04/10/19 279 lb 9.6 oz (126.8 kg)   03/20/19 275 lb (124.7 kg)   03/12/19 278 lb 6.4 oz (126.3 kg)     BP Readings from Last 3 Encounters:   04/10/19 138/84   03/20/19 128/70   03/12/19 134/87        No results found for this visit on 04/10/19. Assessment     Diagnosis Orders   1. Polymyalgia rheumatica ([facility] [address]     2. Temporal arteritis ([facility] [address]     3. Vitamin D deficiency     4. Essential hypertension     5. Hypercholesteremia           Plan  Patient has been started on new medication for polymyalgia rheumatica that could increase his blood pressure and his cholesterol numbers  May need to adjust BP med if elevated - add HCTZ? Check LDL in the summer. Check vitamin D in the summer when LDLs checked  Plan for adipex, but need to keep eye on the BP numbers to be sure not too high. Vit D was low on 5 K daily so on 50K weekly. He is wanting to restart Adipex when he is due at the end of May.  As long as the blood pressure

## 2019-04-11 ENCOUNTER — TELEPHONE (OUTPATIENT)
Dept: ORTHOPEDIC SURGERY | Age: 54
End: 2019-04-11

## 2019-04-11 NOTE — TELEPHONE ENCOUNTER
Prince brasher from [facility] office is calling to get patient CD mailed to them of all his xrays and scans.     Mailing Address is  [place] [address]  [facility]   [address] [facility]    Please call [place] if any questions  [phone]

## 2019-04-11 NOTE — TELEPHONE ENCOUNTER
LVM for [address] informing her his knee xrays were pushed to [facility] PACS system and his doppler will need to be requested from [facility].

## 2019-05-13 RX ORDER — AMLODIPINE BESYLATE 10 MG/1
TABLET ORAL
Qty: 30 TABLET | Refills: 5 | Status: SHIPPED | OUTPATIENT
Start: 2019-05-13 | End: 2019-10-01

## 2019-05-13 RX ORDER — ESCITALOPRAM OXALATE 20 MG/1
TABLET ORAL
Qty: 45 TABLET | Refills: 5 | Status: SHIPPED | OUTPATIENT
Start: 2019-05-13 | End: 2019-07-02 | Stop reason: SDUPTHER

## 2019-06-01 ENCOUNTER — PATIENT MESSAGE (OUTPATIENT)
Dept: FAMILY MEDICINE CLINIC | Age: 54
End: 2019-06-01

## 2019-06-03 RX ORDER — VALSARTAN 160 MG/1
160 TABLET ORAL DAILY
Qty: 30 TABLET | Refills: 5 | Status: SHIPPED | OUTPATIENT
Start: 2019-06-03 | End: 2019-06-03 | Stop reason: SDUPTHER

## 2019-06-03 RX ORDER — VALSARTAN 160 MG/1
160 TABLET ORAL DAILY
Qty: 30 TABLET | Refills: 0 | Status: SHIPPED | OUTPATIENT
Start: 2019-06-03 | End: 2019-10-01 | Stop reason: SDUPTHER

## 2019-06-06 ENCOUNTER — OFFICE VISIT (OUTPATIENT)
Dept: FAMILY MEDICINE CLINIC | Age: 54
End: 2019-06-06
Payer: COMMERCIAL

## 2019-06-06 VITALS
WEIGHT: 286 LBS | BODY MASS INDEX: 38.74 KG/M2 | HEIGHT: 72 IN | RESPIRATION RATE: 16 BRPM | SYSTOLIC BLOOD PRESSURE: 138 MMHG | DIASTOLIC BLOOD PRESSURE: 84 MMHG | HEART RATE: 68 BPM

## 2019-06-06 DIAGNOSIS — R79.89 LOW TESTOSTERONE IN MALE: ICD-10-CM

## 2019-06-06 DIAGNOSIS — D72.819 LEUKOPENIA, UNSPECIFIED TYPE: ICD-10-CM

## 2019-06-06 DIAGNOSIS — H40.60X0 STEROID-INDUCED GLAUCOMA: ICD-10-CM

## 2019-06-06 DIAGNOSIS — R79.89 ELEVATED SERUM CREATININE: ICD-10-CM

## 2019-06-06 DIAGNOSIS — E66.01 CLASS 2 SEVERE OBESITY DUE TO EXCESS CALORIES WITH SERIOUS COMORBIDITY AND BODY MASS INDEX (BMI) OF 38.0 TO 38.9 IN ADULT (HCC): ICD-10-CM

## 2019-06-06 DIAGNOSIS — T38.0X5A STEROID-INDUCED GLAUCOMA: ICD-10-CM

## 2019-06-06 DIAGNOSIS — M31.6 GIANT CELL ARTERITIS (HCC): Primary | ICD-10-CM

## 2019-06-06 DIAGNOSIS — E78.00 HYPERCHOLESTEREMIA: ICD-10-CM

## 2019-06-06 DIAGNOSIS — Q61.02 MULTIPLE RENAL CYSTS: ICD-10-CM

## 2019-06-06 DIAGNOSIS — I10 ESSENTIAL HYPERTENSION: ICD-10-CM

## 2019-06-06 DIAGNOSIS — M19.90 ARTHRITIS: ICD-10-CM

## 2019-06-06 DIAGNOSIS — G56.00 CARPAL TUNNEL SYNDROME, UNSPECIFIED LATERALITY: ICD-10-CM

## 2019-06-06 PROCEDURE — 99214 OFFICE O/P EST MOD 30 MIN: CPT | Performed by: FAMILY MEDICINE

## 2019-06-06 RX ORDER — GABAPENTIN 300 MG/1
CAPSULE ORAL
Refills: 2 | COMMUNITY
Start: 2019-05-13 | End: 2019-10-01 | Stop reason: ALTCHOICE

## 2019-06-06 RX ORDER — DIETHYLPROPION HYDROCHLORIDE 75 MG/1
TABLET ORAL
Qty: 30 TABLET | Refills: 0 | Status: SHIPPED | OUTPATIENT
Start: 2019-06-06 | End: 2019-07-02 | Stop reason: SDUPTHER

## 2019-06-06 NOTE — PATIENT INSTRUCTIONS
Patient Education        Learning About Diuretics for High Blood Pressure  Introduction  Diuretics help to lower blood pressure. This reduces your risk of a heart attack and stroke. It also reduces your risk of kidney disease. Diuretics cause your kidneys to remove sodium and water. They also relax the blood vessel walls. These help lower your blood pressure. Examples  · Chlorthalidone  · Hydrochlorothiazide  Possible side effects  There are some common side effects. They are:  · Too little potassium. · Feeling dizzy. · Rash. · Urinating a lot. · High blood sugar. (But this is not common.)  You may have other side effects. Check the information that comes with your medicine. What to know about taking this medicine  · You may take other medicines for blood pressure. Diuretics can help those work better. They can also prevent extra fluid in your body. · You may need to take potassium pills. Or you may have to watch how much potassium is in your food. Ask your doctor about this. · You may need blood tests to check your kidneys and your potassium level. · Take your medicines exactly as prescribed. Call your doctor if you think you are having a problem with your medicine. · Check with your doctor or pharmacist before you use any other medicines. This includes over-the-counter medicines. Make sure your doctor knows all of the medicines, vitamins, herbal products, and supplements you take. Taking some medicines together can cause problems. Where can you learn more? Go to [email or web address] org and sign in to your [employer] account. Enter D791 in the [facility] box to learn more about \"Learning About Diuretics for High Blood Pressure. \"     If you do not have an account, please click on the \"Sign Up Now\" link. Current as of: July 22, 2018  Content Version: 12.0  © 4700-3604 [employer]. Care instructions adapted under license by [facility] ([facility]).  If you have questions about a medical condition or this instruction, always ask your healthcare professional. [place] [address] any warranty or liability for your use of this information.

## 2019-06-06 NOTE — PROGRESS NOTES
1. Giant cell arteritis (HCC)     2. Steroid-induced glaucoma     3. Class 2 severe obesity due to excess calories with serious comorbidity and body mass index (BMI) of 38.0 to 38.9 in adult (HCC)  Diethylpropion HCl CR 75 MG TB24   4. Arthritis     5. Carpal tunnel syndrome, unspecified laterality     6. Low testosterone in male     7. Multiple renal cysts     8. Essential hypertension     9.  Hypercholesteremia             Plan:      Norvasc/ diovan bump to 320 if stays >140/90 - though machine higher at home along w/ cardura  Fosamax due to steroid use - now stopped  Off steroids and off gtts for iop    F/u rheumatology w/ meds - sulfasalazine w/ actemra  Mood stable on lexapro  Weight concerns / diet/ exercise d/w pt  Start tenuate 75 daily - f/u 1 month  Monitor kidney function  Cont statin  Testosterone injections q2w  Cont vit d supplement    Nory Valdez MD

## 2019-06-07 ENCOUNTER — TELEPHONE (OUTPATIENT)
Dept: INTERNAL MEDICINE CLINIC | Age: 54
End: 2019-06-07

## 2019-06-07 ENCOUNTER — TELEPHONE (OUTPATIENT)
Dept: FAMILY MEDICINE CLINIC | Age: 54
End: 2019-06-07

## 2019-06-07 LAB
A/G RATIO: 2.3 (ref 1.1–2.2)
ALBUMIN SERPL-MCNC: 4.6 G/DL (ref 3.4–5)
ALP BLD-CCNC: 40 U/L (ref 40–129)
ALT SERPL-CCNC: 53 U/L (ref 10–40)
ANION GAP SERPL CALCULATED.3IONS-SCNC: 16 MMOL/L (ref 3–16)
AST SERPL-CCNC: 34 U/L (ref 15–37)
BASOPHILS ABSOLUTE: 0 K/UL (ref 0–0.2)
BASOPHILS RELATIVE PERCENT: 1.5 %
BILIRUB SERPL-MCNC: 1.6 MG/DL (ref 0–1)
BUN BLDV-MCNC: 18 MG/DL (ref 7–20)
CALCIUM SERPL-MCNC: 9.6 MG/DL (ref 8.3–10.6)
CHLORIDE BLD-SCNC: 103 MMOL/L (ref 99–110)
CO2: 24 MMOL/L (ref 21–32)
CREAT SERPL-MCNC: 1.3 MG/DL (ref 0.9–1.3)
EOSINOPHILS ABSOLUTE: 0.1 K/UL (ref 0–0.6)
EOSINOPHILS RELATIVE PERCENT: 2.3 %
GFR AFRICAN AMERICAN: >60
GFR NON-AFRICAN AMERICAN: 58
GLOBULIN: 2 G/DL
GLUCOSE BLD-MCNC: 116 MG/DL (ref 70–99)
HCT VFR BLD CALC: 40.4 % (ref 40.5–52.5)
HEMOGLOBIN: 13.8 G/DL (ref 13.5–17.5)
LYMPHOCYTES ABSOLUTE: 0.9 K/UL (ref 1–5.1)
LYMPHOCYTES RELATIVE PERCENT: 36.1 %
MCH RBC QN AUTO: 32.4 PG (ref 26–34)
MCHC RBC AUTO-ENTMCNC: 34.1 G/DL (ref 31–36)
MCV RBC AUTO: 95 FL (ref 80–100)
MONOCYTES ABSOLUTE: 0.5 K/UL (ref 0–1.3)
MONOCYTES RELATIVE PERCENT: 20.8 %
NEUTROPHILS ABSOLUTE: 1 K/UL (ref 1.7–7.7)
NEUTROPHILS RELATIVE PERCENT: 39.3 %
PDW BLD-RTO: 14.9 % (ref 12.4–15.4)
PLATELET # BLD: 182 K/UL (ref 135–450)
PMV BLD AUTO: 8.7 FL (ref 5–10.5)
POTASSIUM SERPL-SCNC: 3.8 MMOL/L (ref 3.5–5.1)
RBC # BLD: 4.25 M/UL (ref 4.2–5.9)
SODIUM BLD-SCNC: 143 MMOL/L (ref 136–145)
TOTAL PROTEIN: 6.6 G/DL (ref 6.4–8.2)
WBC # BLD: 2.5 K/UL (ref 4–11)

## 2019-06-07 NOTE — TELEPHONE ENCOUNTER
phentiramine not real effective. Wants to try tenuate - has bmi of 38.8.   Having htn, high cholesterol and joint pain  In [facility] , this is time sensitive, so need to get approved in next few days!!

## 2019-06-07 NOTE — TELEPHONE ENCOUNTER
Rafia from [facility] needs more information for the medication DIETHYLPROPION to get approved. Please give her a call back.

## 2019-06-07 NOTE — TELEPHONE ENCOUNTER
Patient had his blood drawn on yesterday and he would like the results. Please give him a call back.

## 2019-06-07 NOTE — TELEPHONE ENCOUNTER
Testosterone is still pending. White blood count is low. This is possible side effect of actrema. May want to check with rheumatologist on this. He should be able to see results in system. Bilirubin and one liver enzyme is mildly elevated. We should recheck this again in next month or so. Limit alcohol intake in meantime.

## 2019-06-08 LAB
SEX HORMONE BINDING GLOBULIN: 32 NMOL/L (ref 11–80)
TESTOSTERONE FREE-NONMALE: 37.1 PG/ML (ref 47–244)
TESTOSTERONE TOTAL: 192 NG/DL (ref 220–1000)

## 2019-06-11 NOTE — TELEPHONE ENCOUNTER
Testosterone is low.   Please verify what dose of depo-testosterone patient has been taking and if he's been compliant taking every 2 weeks

## 2019-06-11 NOTE — TELEPHONE ENCOUNTER
CALLED AND SPOKE TO PATIENT. PATIENT PICKED UP THE MEDICATION THAT INSURANCE IS DENYING AND HAS BEEN ON THIS FOR A FEW DAYS NOW.  SC

## 2019-06-11 NOTE — TELEPHONE ENCOUNTER
Patient can either purchase tenuate out of pocket or I can send in adipex if preferred, but need to do this very soon!

## 2019-06-11 NOTE — TELEPHONE ENCOUNTER
I suggest increasing from 1ml (200mg) to 1.25ml (250mg) injection every 2 weeks. Recheck testosterone level again in 3 months.

## 2019-06-12 ENCOUNTER — TELEPHONE (OUTPATIENT)
Dept: FAMILY MEDICINE CLINIC | Age: 54
End: 2019-06-12

## 2019-07-02 ENCOUNTER — OFFICE VISIT (OUTPATIENT)
Dept: FAMILY MEDICINE CLINIC | Age: 54
End: 2019-07-02
Payer: COMMERCIAL

## 2019-07-02 VITALS
DIASTOLIC BLOOD PRESSURE: 80 MMHG | HEIGHT: 72 IN | HEART RATE: 70 BPM | RESPIRATION RATE: 14 BRPM | WEIGHT: 268 LBS | SYSTOLIC BLOOD PRESSURE: 134 MMHG | BODY MASS INDEX: 36.3 KG/M2

## 2019-07-02 DIAGNOSIS — G57.12 MERALGIA PARESTHETICA OF LEFT SIDE: ICD-10-CM

## 2019-07-02 DIAGNOSIS — E66.01 CLASS 2 SEVERE OBESITY DUE TO EXCESS CALORIES WITH SERIOUS COMORBIDITY AND BODY MASS INDEX (BMI) OF 38.0 TO 38.9 IN ADULT (HCC): ICD-10-CM

## 2019-07-02 DIAGNOSIS — E66.01 CLASS 2 SEVERE OBESITY DUE TO EXCESS CALORIES WITH SERIOUS COMORBIDITY AND BODY MASS INDEX (BMI) OF 35.0 TO 35.9 IN ADULT (HCC): Primary | ICD-10-CM

## 2019-07-02 DIAGNOSIS — D72.819 LEUKOPENIA, UNSPECIFIED TYPE: ICD-10-CM

## 2019-07-02 DIAGNOSIS — M31.6 TEMPORAL ARTERITIS (HCC): ICD-10-CM

## 2019-07-02 DIAGNOSIS — I10 ESSENTIAL HYPERTENSION: ICD-10-CM

## 2019-07-02 PROCEDURE — 99214 OFFICE O/P EST MOD 30 MIN: CPT | Performed by: FAMILY MEDICINE

## 2019-07-02 RX ORDER — DIETHYLPROPION HYDROCHLORIDE 75 MG/1
TABLET ORAL
Qty: 30 TABLET | Refills: 0 | Status: SHIPPED | OUTPATIENT
Start: 2019-07-06 | End: 2019-08-02 | Stop reason: SDUPTHER

## 2019-07-02 RX ORDER — ESCITALOPRAM OXALATE 20 MG/1
TABLET ORAL
Qty: 135 TABLET | Refills: 3 | Status: SHIPPED | OUTPATIENT
Start: 2019-07-02 | End: 2020-01-30 | Stop reason: ALTCHOICE

## 2019-07-02 RX ORDER — ROSUVASTATIN CALCIUM 10 MG/1
10 TABLET, COATED ORAL DAILY
Qty: 90 TABLET | Refills: 3 | Status: SHIPPED | OUTPATIENT
Start: 2019-07-02 | End: 2020-07-09

## 2019-07-02 RX ORDER — DOXAZOSIN 8 MG/1
TABLET ORAL
Qty: 90 TABLET | Refills: 3 | Status: SHIPPED | OUTPATIENT
Start: 2019-07-02 | End: 2019-11-01 | Stop reason: SDUPTHER

## 2019-07-15 RX ORDER — MONTELUKAST SODIUM 10 MG/1
TABLET ORAL
Qty: 30 TABLET | Refills: 5 | Status: SHIPPED | OUTPATIENT
Start: 2019-07-15 | End: 2020-08-10 | Stop reason: SDUPTHER

## 2019-08-02 ENCOUNTER — OFFICE VISIT (OUTPATIENT)
Dept: FAMILY MEDICINE CLINIC | Age: 54
End: 2019-08-02
Payer: COMMERCIAL

## 2019-08-02 VITALS
OXYGEN SATURATION: 99 % | RESPIRATION RATE: 16 BRPM | HEIGHT: 72 IN | SYSTOLIC BLOOD PRESSURE: 132 MMHG | BODY MASS INDEX: 35.76 KG/M2 | HEART RATE: 60 BPM | WEIGHT: 264 LBS | DIASTOLIC BLOOD PRESSURE: 86 MMHG

## 2019-08-02 DIAGNOSIS — E66.01 CLASS 2 SEVERE OBESITY DUE TO EXCESS CALORIES WITH SERIOUS COMORBIDITY AND BODY MASS INDEX (BMI) OF 38.0 TO 38.9 IN ADULT (HCC): ICD-10-CM

## 2019-08-02 DIAGNOSIS — R73.03 PREDIABETES: Primary | ICD-10-CM

## 2019-08-02 LAB — HBA1C MFR BLD: 5.2 %

## 2019-08-02 PROCEDURE — 99213 OFFICE O/P EST LOW 20 MIN: CPT | Performed by: NURSE PRACTITIONER

## 2019-08-02 PROCEDURE — 83036 HEMOGLOBIN GLYCOSYLATED A1C: CPT | Performed by: NURSE PRACTITIONER

## 2019-08-02 RX ORDER — DIETHYLPROPION HYDROCHLORIDE 75 MG/1
TABLET ORAL
Qty: 30 TABLET | Refills: 0 | Status: SHIPPED | OUTPATIENT
Start: 2019-08-02 | End: 2019-09-01

## 2019-08-09 LAB — HBV SURFACE AB TITR SER: <3.5 MIU/ML

## 2019-08-14 LAB — MISCELLANEOUS LAB TEST ORDER: ABNORMAL

## 2019-10-01 ENCOUNTER — OFFICE VISIT (OUTPATIENT)
Dept: FAMILY MEDICINE CLINIC | Age: 54
End: 2019-10-01
Payer: COMMERCIAL

## 2019-10-01 VITALS
OXYGEN SATURATION: 99 % | BODY MASS INDEX: 36.16 KG/M2 | RESPIRATION RATE: 12 BRPM | HEIGHT: 72 IN | SYSTOLIC BLOOD PRESSURE: 150 MMHG | HEART RATE: 68 BPM | DIASTOLIC BLOOD PRESSURE: 80 MMHG | WEIGHT: 267 LBS

## 2019-10-01 DIAGNOSIS — L21.9 SEBORRHEIC DERMATITIS: ICD-10-CM

## 2019-10-01 DIAGNOSIS — R79.89 LOW TESTOSTERONE: ICD-10-CM

## 2019-10-01 DIAGNOSIS — L60.0 INGROWN TOENAIL: ICD-10-CM

## 2019-10-01 DIAGNOSIS — M25.50 POLYARTHRALGIA: ICD-10-CM

## 2019-10-01 DIAGNOSIS — R59.0 CERVICAL LYMPHADENOPATHY: ICD-10-CM

## 2019-10-01 DIAGNOSIS — D70.9 NEUTROPENIA, UNSPECIFIED TYPE (HCC): ICD-10-CM

## 2019-10-01 DIAGNOSIS — M79.10 MYALGIA: ICD-10-CM

## 2019-10-01 DIAGNOSIS — I10 ESSENTIAL HYPERTENSION: Primary | ICD-10-CM

## 2019-10-01 PROCEDURE — 99214 OFFICE O/P EST MOD 30 MIN: CPT | Performed by: FAMILY MEDICINE

## 2019-10-01 RX ORDER — AMLODIPINE BESYLATE 5 MG/1
TABLET ORAL
Qty: 30 TABLET | Refills: 5 | Status: SHIPPED | OUTPATIENT
Start: 2019-10-01 | End: 2020-01-30

## 2019-10-01 RX ORDER — TESTOSTERONE CYPIONATE 200 MG/ML
INJECTION INTRAMUSCULAR
Qty: 6 ML | Refills: 0 | Status: SHIPPED | OUTPATIENT
Start: 2019-10-01 | End: 2021-03-19

## 2019-10-01 RX ORDER — CHLORTHALIDONE 25 MG/1
12.5-25 TABLET ORAL DAILY
Qty: 30 TABLET | Refills: 5 | Status: SHIPPED | OUTPATIENT
Start: 2019-10-01 | End: 2020-03-06 | Stop reason: SDUPTHER

## 2019-10-01 RX ORDER — VALSARTAN 320 MG/1
320 TABLET ORAL DAILY
Qty: 30 TABLET | Refills: 5 | Status: SHIPPED | OUTPATIENT
Start: 2019-10-01 | End: 2020-03-06 | Stop reason: SDUPTHER

## 2019-10-02 ENCOUNTER — TELEPHONE (OUTPATIENT)
Dept: ORTHOPEDIC SURGERY | Age: 54
End: 2019-10-02

## 2019-10-02 ENCOUNTER — TELEPHONE (OUTPATIENT)
Dept: FAMILY MEDICINE CLINIC | Age: 54
End: 2019-10-02

## 2019-10-11 ENCOUNTER — PATIENT MESSAGE (OUTPATIENT)
Dept: FAMILY MEDICINE CLINIC | Age: 54
End: 2019-10-11

## 2019-10-31 RX ORDER — ESOMEPRAZOLE MAGNESIUM 40 MG/1
CAPSULE, DELAYED RELEASE ORAL
Qty: 30 CAPSULE | Refills: 5 | OUTPATIENT
Start: 2019-10-31

## 2019-11-01 ENCOUNTER — OFFICE VISIT (OUTPATIENT)
Dept: FAMILY MEDICINE CLINIC | Age: 54
End: 2019-11-01
Payer: COMMERCIAL

## 2019-11-01 VITALS
SYSTOLIC BLOOD PRESSURE: 126 MMHG | HEART RATE: 80 BPM | WEIGHT: 274 LBS | RESPIRATION RATE: 16 BRPM | DIASTOLIC BLOOD PRESSURE: 76 MMHG | HEIGHT: 72 IN | BODY MASS INDEX: 37.11 KG/M2

## 2019-11-01 DIAGNOSIS — M13.0 POLYARTHRITIS: ICD-10-CM

## 2019-11-01 DIAGNOSIS — R74.8 ELEVATED LIVER ENZYMES: ICD-10-CM

## 2019-11-01 DIAGNOSIS — I10 ESSENTIAL HYPERTENSION: Primary | ICD-10-CM

## 2019-11-01 DIAGNOSIS — R79.89 LOW TESTOSTERONE: ICD-10-CM

## 2019-11-01 DIAGNOSIS — R63.5 WEIGHT GAIN: ICD-10-CM

## 2019-11-01 DIAGNOSIS — E55.9 VITAMIN D DEFICIENCY: ICD-10-CM

## 2019-11-01 DIAGNOSIS — R53.83 FATIGUE, UNSPECIFIED TYPE: ICD-10-CM

## 2019-11-01 DIAGNOSIS — D72.819 LEUKOPENIA, UNSPECIFIED TYPE: ICD-10-CM

## 2019-11-01 PROCEDURE — 99214 OFFICE O/P EST MOD 30 MIN: CPT | Performed by: FAMILY MEDICINE

## 2019-11-01 RX ORDER — PREDNISONE 10 MG/1
10 TABLET ORAL
COMMUNITY
Start: 2019-10-16 | End: 2020-03-27 | Stop reason: ALTCHOICE

## 2019-11-01 RX ORDER — ESOMEPRAZOLE MAGNESIUM 40 MG/1
CAPSULE, DELAYED RELEASE ORAL
Qty: 30 CAPSULE | Refills: 5 | Status: SHIPPED | OUTPATIENT
Start: 2019-11-01 | End: 2020-03-06 | Stop reason: SDUPTHER

## 2019-11-01 RX ORDER — DOXAZOSIN 8 MG/1
TABLET ORAL
Qty: 30 TABLET | Refills: 5 | Status: SHIPPED | OUTPATIENT
Start: 2019-11-01 | End: 2020-03-06 | Stop reason: SDUPTHER

## 2019-11-01 RX ORDER — SULFASALAZINE 500 MG/1
TABLET, DELAYED RELEASE ORAL
COMMUNITY
Start: 2019-10-16 | End: 2020-01-30 | Stop reason: ALTCHOICE

## 2019-12-23 LAB
A/G RATIO: ABNORMAL
ALBUMIN SERPL-MCNC: 4.7 G/DL
ALP BLD-CCNC: 64 U/L
ALT SERPL-CCNC: 31 U/L
AST SERPL-CCNC: 23 U/L
BASOPHILS ABSOLUTE: 25 /ΜL
BASOPHILS RELATIVE PERCENT: 0.6 %
BILIRUB SERPL-MCNC: 1.6 MG/DL (ref 0.1–1.4)
BILIRUBIN DIRECT: 0.29 MG/DL
BILIRUBIN, INDIRECT: 1.31
BILIRUBIN, URINE: NEGATIVE
BLOOD, URINE: NEGATIVE
C-REACTIVE PROTEIN: 5.4
CLARITY: CLEAR
COLOR: YELLOW
CREATININE: 1.4 MG/DL
EOSINOPHILS ABSOLUTE: 119 /ΜL
EOSINOPHILS RELATIVE PERCENT: 2.9 %
GLOBULIN: ABNORMAL
GLUCOSE URINE: NEGATIVE
HCT VFR BLD CALC: 43.6 % (ref 41–53)
HEMOGLOBIN: 14.6 G/DL (ref 13.5–17.5)
KETONES, URINE: NEGATIVE
LEUKOCYTE ESTERASE, URINE: NEGATIVE
LYMPHOCYTES ABSOLUTE: 865 /ΜL
LYMPHOCYTES RELATIVE PERCENT: 21.1 %
MCH RBC QN AUTO: 31.5 PG
MCHC RBC AUTO-ENTMCNC: 33.6 G/DL
MCV RBC AUTO: 93.9 FL
MONOCYTES ABSOLUTE: 619 /ΜL
MONOCYTES RELATIVE PERCENT: 15.1 %
NEUTROPHILS ABSOLUTE: 2.47 /ΜL
NEUTROPHILS RELATIVE PERCENT: 60.3 %
NITRITE, URINE: NEGATIVE
PH UA: 6 (ref 4.5–8)
PLATELET # BLD: 197 K/ΜL
PMV BLD AUTO: 8 FL
PROTEIN TOTAL: 7 G/DL
PROTEIN UA: NEGATIVE
RBC # BLD: 4.64 10^6/ΜL
SEDIMENTATION RATE, ERYTHROCYTE: 11
SPECIFIC GRAVITY, URINE: 1.01
TOTAL CK: 193 U/L
UROBILINOGEN, URINE: NORMAL
WBC # BLD: 4.1 10^3/ML

## 2020-01-30 ENCOUNTER — OFFICE VISIT (OUTPATIENT)
Dept: FAMILY MEDICINE CLINIC | Age: 55
End: 2020-01-30
Payer: COMMERCIAL

## 2020-01-30 VITALS
HEIGHT: 72 IN | HEART RATE: 92 BPM | SYSTOLIC BLOOD PRESSURE: 138 MMHG | OXYGEN SATURATION: 99 % | DIASTOLIC BLOOD PRESSURE: 86 MMHG | WEIGHT: 291 LBS | RESPIRATION RATE: 18 BRPM | BODY MASS INDEX: 39.42 KG/M2

## 2020-01-30 PROCEDURE — 99214 OFFICE O/P EST MOD 30 MIN: CPT | Performed by: FAMILY MEDICINE

## 2020-01-30 RX ORDER — AMLODIPINE BESYLATE 10 MG/1
TABLET ORAL
Qty: 30 TABLET | Refills: 5 | Status: SHIPPED | OUTPATIENT
Start: 2020-01-30 | End: 2020-11-05

## 2020-01-30 RX ORDER — DIETHYLPROPION HYDROCHLORIDE 75 MG/1
TABLET ORAL
Qty: 30 TABLET | Refills: 0 | Status: SHIPPED | OUTPATIENT
Start: 2020-02-03 | End: 2020-03-04

## 2020-01-30 RX ORDER — HYDROXYZINE HYDROCHLORIDE 25 MG/1
25-50 TABLET, FILM COATED ORAL NIGHTLY
Qty: 60 TABLET | Refills: 5 | Status: SHIPPED | OUTPATIENT
Start: 2020-01-30 | End: 2020-02-29

## 2020-01-30 ASSESSMENT — PATIENT HEALTH QUESTIONNAIRE - PHQ9: DEPRESSION UNABLE TO ASSESS: FUNCTIONAL CAPACITY MOTIVATION LIMITS ACCURACY

## 2020-01-30 NOTE — PROGRESS NOTES
Dr. Jody Brown   [facility], [place], [address]  Phone: [phone]    HPI:  Chief Complaint   Patient presents with    Hypertension     HTN ROUTINE FOLLOW UP         Yesenia Condon is a 48 y.o. male here for evaluation of hypertension. He reports that he is currently on 10 mg of prednisone and is back on sulphalasalazine for polyarthritis. He states that he had elevated LFT's and he has been back on sulphalazine for the past couple of weeks. Patient notes that he recently started back on gabapentin 300 mg and has been taking melatonin 5 mg 1 tablet nightly. He reports that he has been using testosterone cypionate 200 mg/mL every 2 weeks. He reports that he will be visiting his rheumatologist, Dr. Juan Alberto Torres, at the end of February. Patient notes that he has urinary frequency with a normal urinary stream. He notes that he took diethylpropion 75 mg in the past and would like to go back on it. Patient states that he took 3000 units of Vitamin D in the past. He reports that he may be changing positions at his job in the near future and may not be working night shift anymore. Denies lower extremity edema.      Vitals:  BP Readings from Last 3 Encounters:   01/30/20 138/86   11/01/19 126/76   10/01/19 (!) 150/80       Pulse Readings from Last 3 Encounters:   01/30/20 92   11/01/19 80   10/01/19 68        Wt Readings from Last 3 Encounters:   01/30/20 291 lb (132 kg)   11/01/19 274 lb (124.3 kg)   10/01/19 267 lb (121.1 kg)        Medication Review:  Current Outpatient Medications   Medication Sig Dispense Refill    hydrOXYzine (ATARAX) 25 MG tablet Take 1-2 tablets by mouth nightly 60 tablet 5    amLODIPine (NORVASC) 10 MG tablet 1 po daily 30 tablet 5    [START ON 2/3/2020] Diethylpropion HCl CR 75 MG TB24 1 PO DAILY 30 tablet 0    buPROPion (WELLBUTRIN XL) 150 MG extended release tablet Take 1 tablet by mouth every morning 30 tablet 1    predniSONE (DELTASONE) 10 MG w/ diet/ exercise changes    Hypercholesteremia  -     Lipid Panel; Future  - Continue on rosuvastatin 10 mg 1 tablet daily     Leukopenia, unspecified type  -     CBC Auto Differential; Future    Prostate cancer screening  -     Psa screening; Future    Essential hypertension  - blood pressure stable today  - Increase amlodipine from 5 mg to 10 mg because of starting on diethylpropion 75 mg and borderline high readings -cont chlorthalidone, cardura and diovan  - recommended patient monitor blood pressure daily  - advised patient to monitor salt intake and to maintain a healthy diet with fruits and vegetables      Reviewed previous labs and discussed results with patient:  - Testerone low on 6/6/19  - orders for Lipid Panel, CMP, Vitamin D, CBC, Testerone, and PSA have been placed and will be done in the near future; will contact patient if there are any abnormal results or findings       I have reviewed patient's pertinent medical history, relevant laboratory and imaging studies, and past/future health maintenance. Patient's medications have been reviewed and were discussed with the patient. Refills given today, see note below. Discussed with the patient the importance of adhering to their current medication regimen as directed. Advised the patient that they should continue to work on eating a healthy balanced diet and staying active by exercising within their personal limits. Patient was advised to keep future appointments with their respective specialty care team(s). Patient had the opportunity to ask questions, all of which were answered to the best of my ability and with patient satisfaction. Patient advised to schedule a return visit for reevaluation in 1 month. Patient understands and is agreeable with the care plan following today's visit. Patient is to schedule an appointment for any new or worsening symptoms.        Requested Prescriptions     Signed Prescriptions Disp Refills    hydrOXYzine (ATARAX) 25 MG tablet 60 tablet 5     Sig: Take 1-2 tablets by mouth nightly    amLODIPine (NORVASC) 10 MG tablet 30 tablet 5     Si po daily    Diethylpropion HCl CR 75 MG TB24 30 tablet 0     Si PO DAILY      Orders Placed This Encounter   Procedures    Lipid Panel     Standing Status:   Future     Standing Expiration Date:   2021     Order Specific Question:   Is Patient Fasting?/# of Hours     Answer:   8 hours    Comprehensive Metabolic Panel     Standing Status:   Future     Standing Expiration Date:   2021    Vitamin D 25 Hydroxy     Standing Status:   Future     Standing Expiration Date:   2021    CBC Auto Differential     Standing Status:   Future     Standing Expiration Date:   2021    Testosterone, free, total     Standing Status:   Future     Standing Expiration Date:   2021    Psa screening     Standing Status:   Future     Standing Expiration Date:   2021           By signing my name below, I, Theda Buerger, attest that this documentation has been prepared under the direction and in the presence of Robbie Scott MD.   Electronically Signed: Theda Buerger, Scribe, 2020, 4:15 PM.      Roel Kirby MD, personally performed the services described in this documentation. All medical record entries made by the scribe were at my direction and in my presence. I have reviewed the chart and discharge instructions (if applicable) and agree that the record reflects my personal performance and is accurate and complete.  Robbie Scott MD, 2020, 5:55 PM.

## 2020-02-04 LAB
A/G RATIO: 2.1 (ref 1.1–2.2)
ALBUMIN SERPL-MCNC: 4.6 G/DL (ref 3.4–5)
ALP BLD-CCNC: 74 U/L (ref 40–129)
ALT SERPL-CCNC: 33 U/L (ref 10–40)
ANION GAP SERPL CALCULATED.3IONS-SCNC: 13 MMOL/L (ref 3–16)
AST SERPL-CCNC: 26 U/L (ref 15–37)
BASOPHILS ABSOLUTE: 0 K/UL (ref 0–0.2)
BASOPHILS RELATIVE PERCENT: 0.6 %
BILIRUB SERPL-MCNC: 1 MG/DL (ref 0–1)
BUN BLDV-MCNC: 14 MG/DL (ref 7–20)
CALCIUM SERPL-MCNC: 10 MG/DL (ref 8.3–10.6)
CHLORIDE BLD-SCNC: 100 MMOL/L (ref 99–110)
CHOLESTEROL, TOTAL: 213 MG/DL (ref 0–199)
CO2: 30 MMOL/L (ref 21–32)
CREAT SERPL-MCNC: 1.3 MG/DL (ref 0.9–1.3)
EOSINOPHILS ABSOLUTE: 0.1 K/UL (ref 0–0.6)
EOSINOPHILS RELATIVE PERCENT: 1.7 %
GFR AFRICAN AMERICAN: >60
GFR NON-AFRICAN AMERICAN: 58
GLOBULIN: 2.2 G/DL
GLUCOSE BLD-MCNC: 99 MG/DL (ref 70–99)
HCT VFR BLD CALC: 43.2 % (ref 40.5–52.5)
HDLC SERPL-MCNC: 61 MG/DL (ref 40–60)
HEMOGLOBIN: 14.5 G/DL (ref 13.5–17.5)
LDL CHOLESTEROL CALCULATED: 119 MG/DL
LYMPHOCYTES ABSOLUTE: 1.1 K/UL (ref 1–5.1)
LYMPHOCYTES RELATIVE PERCENT: 21.9 %
MCH RBC QN AUTO: 32.8 PG (ref 26–34)
MCHC RBC AUTO-ENTMCNC: 33.6 G/DL (ref 31–36)
MCV RBC AUTO: 97.7 FL (ref 80–100)
MONOCYTES ABSOLUTE: 0.7 K/UL (ref 0–1.3)
MONOCYTES RELATIVE PERCENT: 15 %
NEUTROPHILS ABSOLUTE: 3 K/UL (ref 1.7–7.7)
NEUTROPHILS RELATIVE PERCENT: 60.8 %
PDW BLD-RTO: 14 % (ref 12.4–15.4)
PLATELET # BLD: 218 K/UL (ref 135–450)
PMV BLD AUTO: 8 FL (ref 5–10.5)
POTASSIUM SERPL-SCNC: 4.3 MMOL/L (ref 3.5–5.1)
PROSTATE SPECIFIC ANTIGEN: 0.69 NG/ML (ref 0–4)
RBC # BLD: 4.42 M/UL (ref 4.2–5.9)
SODIUM BLD-SCNC: 143 MMOL/L (ref 136–145)
TOTAL PROTEIN: 6.8 G/DL (ref 6.4–8.2)
TRIGL SERPL-MCNC: 165 MG/DL (ref 0–150)
VITAMIN D 25-HYDROXY: 24.7 NG/ML
VLDLC SERPL CALC-MCNC: 33 MG/DL
WBC # BLD: 4.9 K/UL (ref 4–11)

## 2020-02-06 LAB
SEX HORMONE BINDING GLOBULIN: 12 NMOL/L (ref 11–80)
TESTOSTERONE FREE-NONMALE: 195.5 PG/ML (ref 47–244)
TESTOSTERONE TOTAL: 598 NG/DL (ref 220–1000)

## 2020-02-07 ENCOUNTER — TELEPHONE (OUTPATIENT)
Dept: INTERNAL MEDICINE CLINIC | Age: 55
End: 2020-02-07

## 2020-03-02 RX ORDER — BUPROPION HYDROCHLORIDE 150 MG/1
150 TABLET ORAL EVERY MORNING
Qty: 30 TABLET | Refills: 0 | Status: SHIPPED | OUTPATIENT
Start: 2020-03-02 | End: 2020-03-06 | Stop reason: SDUPTHER

## 2020-03-06 ENCOUNTER — OFFICE VISIT (OUTPATIENT)
Dept: FAMILY MEDICINE CLINIC | Age: 55
End: 2020-03-06
Payer: COMMERCIAL

## 2020-03-06 VITALS
HEIGHT: 72 IN | HEART RATE: 72 BPM | BODY MASS INDEX: 40.23 KG/M2 | RESPIRATION RATE: 16 BRPM | OXYGEN SATURATION: 99 % | SYSTOLIC BLOOD PRESSURE: 134 MMHG | WEIGHT: 297 LBS | DIASTOLIC BLOOD PRESSURE: 82 MMHG

## 2020-03-06 PROCEDURE — 99214 OFFICE O/P EST MOD 30 MIN: CPT | Performed by: FAMILY MEDICINE

## 2020-03-06 RX ORDER — BUPROPION HYDROCHLORIDE 150 MG/1
150 TABLET ORAL EVERY MORNING
Qty: 30 TABLET | Refills: 5 | Status: SHIPPED | OUTPATIENT
Start: 2020-03-06 | End: 2020-11-17

## 2020-03-06 RX ORDER — CHLORTHALIDONE 25 MG/1
12.5-25 TABLET ORAL DAILY
Qty: 30 TABLET | Refills: 5 | Status: SHIPPED | OUTPATIENT
Start: 2020-03-06 | End: 2021-03-19

## 2020-03-06 RX ORDER — ESOMEPRAZOLE MAGNESIUM 40 MG/1
CAPSULE, DELAYED RELEASE ORAL
Qty: 30 CAPSULE | Refills: 5 | Status: SHIPPED | OUTPATIENT
Start: 2020-03-06 | End: 2020-11-17

## 2020-03-06 RX ORDER — VALSARTAN 320 MG/1
320 TABLET ORAL DAILY
Qty: 30 TABLET | Refills: 5 | Status: SHIPPED | OUTPATIENT
Start: 2020-03-06 | End: 2021-03-19 | Stop reason: ALTCHOICE

## 2020-03-06 RX ORDER — DOXAZOSIN 8 MG/1
TABLET ORAL
Qty: 30 TABLET | Refills: 5 | Status: SHIPPED | OUTPATIENT
Start: 2020-03-06 | End: 2020-12-18 | Stop reason: SDUPTHER

## 2020-03-06 ASSESSMENT — PATIENT HEALTH QUESTIONNAIRE - PHQ9: DEPRESSION UNABLE TO ASSESS: FUNCTIONAL CAPACITY MOTIVATION LIMITS ACCURACY

## 2020-03-06 NOTE — PROGRESS NOTES
morning 30 tablet 5    amLODIPine (NORVASC) 10 MG tablet 1 po daily 30 tablet 5    predniSONE (DELTASONE) 10 MG tablet Take 10 mg by mouth      testosterone cypionate (DEPOTESTOTERONE CYPIONATE) 200 MG/ML injection Inject 200mg IM q14 days as directed 6 mL 0    montelukast (SINGULAIR) 10 MG tablet TAKE 1 TABLET BY MOUTH DAILY 30 tablet 5    rosuvastatin (CRESTOR) 10 MG tablet Take 1 tablet by mouth daily 90 tablet 3    ondansetron (ZOFRAN ODT) 8 MG TBDP disintegrating tablet Take 1 tablet by mouth every 8 hours as needed for Nausea 20 tablet 2    vitamin D (ERGOCALCIFEROL) 65228 units CAPS capsule Take 1 capsule by mouth once a week 4 capsule 2    Cholecalciferol (VITAMIN D) 2000 units TABS tablet Take 1 tablet by mouth daily After finishing 12 week couse 30 tablet 11    melatonin 5 MG TABS tablet Take 1-2 tablets by mouth nightly 60 tablet 2    aspirin (ASPIRIN LOW DOSE) 81 MG tablet Take 1 tablet by mouth daily 365 tablet 0     No current facility-administered medications for this visit. Review of Systems:   All others are negative, except as noted in the HPI. Patient History:  Past Medical History:   Diagnosis Date    Asthma     Colon polyps 06/21/2018    COLONOSCOPY, DR CARTER GARCIA, CECUM POLYP TUBULAR ADENOMAS.  Gastritis 06/21/2018    EGD, DR CARTER GARCIA, MILD CHRONIC GASTRITIS.  GERD (gastroesophageal reflux disease)     Hyperlipidemia     Hyperplastic rectal polyp 06/21/2018    COLONOSCOPY, DR CARTER GARCIA, HYPERPLASTIC POLYP RECTAL.     Hypertension     Increased intraocular pressure     Migraine     Pneumonia     x 3    Testosterone deficiency 4/6/2017        Past Surgical History:   Procedure Laterality Date    CHOLECYSTECTOMY, LAPAROSCOPIC  8-11-11    COLONOSCOPY  06/21/2018    COLONOSCOPY, DR CARTER GARCIA, COLON POLYPS X2, RECTAL POLYP X1.    NASAL SEPTUM SURGERY      and palatoplasty        Social History     Socioeconomic History    Marital status:      Spouse name: Not on file    Number of children: Not on file    Years of education: Not on file    Highest education level: Not on file   Occupational History    Occupation:    Social Needs    Financial resource strain: Not on file    Food insecurity:     Worry: Not on file     Inability: Not on file   [employer] needs:     Medical: Not on file     Non-medical: Not on file   Tobacco Use    Smoking status: Never Smoker    Smokeless tobacco: Never Used   Substance and Sexual Activity    Alcohol use: Yes     Comment: rare    Drug use: No    Sexual activity: Not on file   Lifestyle    Physical activity:     Days per week: Not on file     Minutes per session: Not on file    Stress: Not on file   Relationships    Social connections:     Talks on phone: Not on file     Gets together: Not on file     Attends [employer] service: Not on file     Active member of club or organization: Not on file     Attends meetings of clubs or organizations: Not on file     Relationship status: Not on file    Intimate partner violence:     Fear of current or ex partner: Not on file     Emotionally abused: Not on file     Physically abused: Not on file     Forced sexual activity: Not on file   Other Topics Concern    Not on file   Social History Narrative    [employer] 4x/wk. 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 Family History   Problem Relation Age of Onset    High Blood Pressure Mother     Cancer Father         testicular, multiple myeloma    Heart Attack Father 47        CABG 4V    Other Father         MRSA    No Known Problems Brother     Early Death Maternal Uncle           Physical Exam:  Physical Exam  Vitals signs and nursing note reviewed. Constitutional:       General: He is not in acute distress. Appearance: He is well-developed. HENT:      Head: Normocephalic and atraumatic.       Right Ear: External ear normal. Left Ear: External ear normal.   Eyes:      General: No scleral icterus. Conjunctiva/sclera: Conjunctivae normal.   Cardiovascular:      Rate and Rhythm: Normal rate and regular rhythm. Heart sounds: Normal heart sounds. No murmur. Pulmonary:      Effort: Pulmonary effort is normal. No respiratory distress. Breath sounds: Normal breath sounds. No wheezing or rales. Abdominal:      General: There is no distension. Palpations: Abdomen is soft. Tenderness: There is no abdominal tenderness. Skin:     General: Skin is warm and dry. Neurological:      Mental Status: He is alert and oriented to person, place, and time.             Laboratory Studies:  Lab Results   Component Value Date    LABA1C 5.2 08/02/2019    LABA1C 6.0 08/02/2018    LABA1C 5.4 07/07/2015        Lab Results   Component Value Date    WBC 4.9 02/04/2020    HGB 14.5 02/04/2020    HCT 43.2 02/04/2020    MCV 97.7 02/04/2020     02/04/2020        Lab Results   Component Value Date     02/04/2020    K 4.3 02/04/2020     02/04/2020    CO2 30 02/04/2020    BUN 14 02/04/2020    CREATININE 1.3 02/04/2020    GLUCOSE 99 02/04/2020    CALCIUM 10.0 02/04/2020    PROT 6.8 02/04/2020    LABALBU 4.6 02/04/2020    BILITOT 1.0 02/04/2020    ALKPHOS 74 02/04/2020    AST 26 02/04/2020    ALT 33 02/04/2020    LABGLOM 58 (A) 02/04/2020    GFRAA >60 02/04/2020    AGRATIO 2.1 02/04/2020    GLOB 2.2 02/04/2020       Lab Results   Component Value Date    CHOL 213 (H) 02/04/2020    TRIG 165 (H) 02/04/2020    HDL 61 (H) 02/04/2020    LDLCALC 119 (H) 02/04/2020    LABVLDL 33 02/04/2020       Lab Results   Component Value Date    TSH 2.72 06/04/2018    T4FREE 1.1 08/19/2014        Lab Results   Component Value Date    VITD25 24.7 (L) 02/04/2020    VITD25 28.5 (L) 03/20/2019    VITD25 33.9 12/12/2018            Health Maintenance Review:  Health Maintenance Due   Topic Date Due    Shingles Vaccine (1 of 2) 12/28/2017         Cancer Screenings:  Lab Results   Component Value Date    PSA 0.69 02/04/2020    PSA 0.43 12/12/2018    PSA 0.45 10/05/2018     Immunizations:  Immunization History   Administered Date(s) Administered    Hepatitis A 06/03/2014, 12/23/2014    Hepatitis B Adult (Engerix-B) 08/27/2019, 09/30/2019    Influenza Vaccine, unspecified formulation 12/02/2003, 09/14/2009, 09/30/2013, 10/30/2016, 12/08/2017    Influenza Virus Vaccine 10/30/2012, 10/01/2013, 09/18/2014, 10/18/2015, 11/26/2019    Influenza, Quadv, IM, PF (6 mo and older Fluzone, Flulaval, Fluarix, and 3 yrs and older Afluria) 11/26/2019    Td, unspecified formulation 01/01/1999, 01/01/2003    Tdap (Boostrix, Adacel) 12/06/2013         Assessment:   1. Essential hypertension    2. Morbid obesity ([address]    3. Insomnia, unspecified type    4. Gastroesophageal reflux disease, esophagitis presence not specified    5. Low testosterone in male           Plan:  Essential hypertension  - blood pressure stable today  - Continue on cardura 8 mg 1 tablet daily, valsartan 320 mg 1 tablet daily, amlodipine 10 mg 1 tablet daily and hygroton 25 mg 0.5 tablet daily  - recommended patient monitor blood pressure daily  - advised patient to monitor salt intake and to maintain a healthy diet with fruits and vegetables      Morbid obesity ([facility] [address]  - He is on diethylpropion from a weight loss clinic in [place] -diet/ exercise d/w pt - has not started med yet - bp monitoring stressed    Insomnia, unspecified type  - Not interested in any medications currently     Gastroesophageal reflux disease, esophagitis presence not specified  -     Continue on esomeprazole (NEXIUM) 40 MG delayed release capsule; TAKE ONE CAPSULE BY MOUTH DAILY - Well tolerated    Low testosterone in male  - Continue on depotestosterone cypionate 200 mg inject every 14 days  -labs reviewed w/ pt - doing well    Other orders  -     buPROPion (WELLBUTRIN XL) 150 MG extended release tablet;  Take 1 tablet by mouth every morning        I have reviewed patient's pertinent medical history, relevant laboratory and imaging studies, and past/future health maintenance. Patient's medications have been reviewed and were discussed with the patient. Refills given today, see note below. Discussed with the patient the importance of adhering to their current medication regimen as directed. Advised the patient that they should continue to work on eating a healthy balanced diet and staying active by exercising within their personal limits. Patient was advised to keep future appointments with their respective specialty care team(s). Patient had the opportunity to ask questions, all of which were answered to the best of my ability and with patient satisfaction. Patient advised to schedule a return visit for reevaluation in as needed. Patient understands and is agreeable with the care plan following today's visit. Patient is to schedule an appointment for any new or worsening symptoms. Requested Prescriptions     Signed Prescriptions Disp Refills    doxazosin (CARDURA) 8 MG tablet 30 tablet 5     Sig: TAKE 1 TABLET BY MOUTH EVERY NIGHT    esomeprazole (NEXIUM) 40 MG delayed release capsule 30 capsule 5     Sig: TAKE ONE CAPSULE BY MOUTH DAILY    valsartan (DIOVAN) 320 MG tablet 30 tablet 5     Sig: Take 1 tablet by mouth daily    chlorthalidone (HYGROTON) 25 MG tablet 30 tablet 5     Sig: Take 0.5-1 tablets by mouth daily    buPROPion (WELLBUTRIN XL) 150 MG extended release tablet 30 tablet 5     Sig: Take 1 tablet by mouth every morning      No orders of the defined types were placed in this encounter. By signing my name below, Ramon Tai, attest that this documentation has been prepared under the direction and in the presence of Robbin Gibson MD.   Electronically Signed: Anselmo Slade, 3/6/2020, 4:35 PM.      Baudilio Villavicencio MD, personally performed the services described in this documentation.  All medical record entries made by the scribe were at my direction and in my presence. I have reviewed the chart and discharge instructions (if applicable) and agree that the record reflects my personal performance and is accurate and complete.  Cayla Calderón MD, 3/6/2020, 6:38 PM.

## 2020-03-11 ENCOUNTER — TELEPHONE (OUTPATIENT)
Dept: ADMINISTRATIVE | Age: 55
End: 2020-03-11

## 2020-03-11 NOTE — TELEPHONE ENCOUNTER
OK TO USE GERD PER [facility] LAST NOTE       Gastroesophageal reflux disease, esophagitis presence not specified  -     Continue on esomeprazole (NEXIUM) 40 MG delayed release capsule; TAKE ONE CAPSULE BY MOUTH DAILY - Well tolerated

## 2020-03-27 ENCOUNTER — TELEMEDICINE (OUTPATIENT)
Dept: FAMILY MEDICINE CLINIC | Age: 55
End: 2020-03-27
Payer: COMMERCIAL

## 2020-03-27 PROCEDURE — 99213 OFFICE O/P EST LOW 20 MIN: CPT | Performed by: FAMILY MEDICINE

## 2020-03-27 RX ORDER — PREDNISONE 1 MG/1
5 TABLET ORAL DAILY
COMMUNITY
End: 2021-03-19

## 2020-03-27 RX ORDER — SULFASALAZINE 500 MG/1
2 TABLET, DELAYED RELEASE ORAL 2 TIMES DAILY
COMMUNITY
Start: 2020-03-02 | End: 2021-03-19 | Stop reason: ALTCHOICE

## 2020-03-27 RX ORDER — GABAPENTIN 300 MG/1
1-2 CAPSULE ORAL NIGHTLY
COMMUNITY
Start: 2020-03-02 | End: 2022-01-14 | Stop reason: ALTCHOICE

## 2020-03-27 NOTE — PROGRESS NOTES
evaluation of the following organ systems is limited: Vitals/Constitutional/EENT/Resp/CV/GI//MS/Neuro/Skin/Heme-Lymph-Imm. ASSESSMENT/PLAN:  There are no diagnoses linked to this encounter. Diagnosis Orders   1. Paronychia of great toe, left       Soak in epsom salts bid and keep clean - topical bacitracin. Use bactrim ds bid for next 7 days  Please eat yogurt daily or take probiotic supplement while on this antibiotic and for additional week after finishing the antibiotic to protect your GI tract. Elevate/ open toed shoes to minimize pressure  Refer podiatry/ pcp if fails to improve  No follow-ups on file. An  electronic signature was used to authenticate this note. --Greg Venegas MD on 3/27/2020 at 3:31 PM        Pursuant to the emergency declaration under the [facility], [facility] waiver authority and the [employer] Act, this Virtual  Visit was conducted, with patient's consent, to reduce the patient's risk of exposure to COVID-19 and provide continuity of care for an established patient. Services were provided through a video synchronous discussion virtually to substitute for in-person clinic visit.

## 2020-04-01 RX ORDER — ESCITALOPRAM OXALATE 20 MG/1
TABLET ORAL
Qty: 90 TABLET | Refills: 3 | Status: SHIPPED | OUTPATIENT
Start: 2020-04-01 | End: 2021-03-19

## 2020-06-08 ENCOUNTER — TELEPHONE (OUTPATIENT)
Dept: ADMINISTRATIVE | Age: 55
End: 2020-06-08

## 2020-06-08 NOTE — TELEPHONE ENCOUNTER
Submitted PA for Valsartan 80MG tablets    Via CMM Key: [id number]     STATUS: Available without authorization  . Please notify patient, thank you.

## 2020-07-06 RX ORDER — DOXYCYCLINE HYCLATE 100 MG
100 TABLET ORAL 2 TIMES DAILY
Qty: 56 TABLET | Refills: 0 | Status: SHIPPED | OUTPATIENT
Start: 2020-07-06 | End: 2020-08-03

## 2020-07-09 RX ORDER — ROSUVASTATIN CALCIUM 10 MG/1
10 TABLET, COATED ORAL DAILY
Qty: 90 TABLET | Refills: 3 | Status: SHIPPED | OUTPATIENT
Start: 2020-07-09 | End: 2021-06-16 | Stop reason: SDUPTHER

## 2020-08-10 RX ORDER — MONTELUKAST SODIUM 10 MG/1
TABLET ORAL
Qty: 30 TABLET | Refills: 2 | Status: SHIPPED | OUTPATIENT
Start: 2020-08-10 | End: 2021-06-16 | Stop reason: SDUPTHER

## 2020-08-17 RX ORDER — ZOLPIDEM TARTRATE 10 MG/1
10 TABLET ORAL NIGHTLY PRN
Qty: 30 TABLET | Refills: 0 | Status: SHIPPED | OUTPATIENT
Start: 2020-08-17 | End: 2021-01-06

## 2020-11-04 ENCOUNTER — OFFICE VISIT (OUTPATIENT)
Dept: FAMILY MEDICINE CLINIC | Age: 55
End: 2020-11-04
Payer: COMMERCIAL

## 2020-11-04 VITALS
HEIGHT: 72 IN | OXYGEN SATURATION: 97 % | SYSTOLIC BLOOD PRESSURE: 126 MMHG | WEIGHT: 263 LBS | RESPIRATION RATE: 18 BRPM | TEMPERATURE: 98.1 F | DIASTOLIC BLOOD PRESSURE: 82 MMHG | HEART RATE: 87 BPM | BODY MASS INDEX: 35.62 KG/M2

## 2020-11-04 LAB
A/G RATIO: 2.4 (ref 1.1–2.2)
ALBUMIN SERPL-MCNC: 4.5 G/DL (ref 3.4–5)
ALP BLD-CCNC: 80 U/L (ref 40–129)
ALT SERPL-CCNC: 39 U/L (ref 10–40)
ANION GAP SERPL CALCULATED.3IONS-SCNC: 12 MMOL/L (ref 3–16)
AST SERPL-CCNC: 28 U/L (ref 15–37)
BASOPHILS ABSOLUTE: 0 K/UL (ref 0–0.2)
BASOPHILS RELATIVE PERCENT: 0.9 %
BILIRUB SERPL-MCNC: 0.8 MG/DL (ref 0–1)
BUN BLDV-MCNC: 17 MG/DL (ref 7–20)
CALCIUM SERPL-MCNC: 9.3 MG/DL (ref 8.3–10.6)
CHLORIDE BLD-SCNC: 104 MMOL/L (ref 99–110)
CHOLESTEROL, TOTAL: 174 MG/DL (ref 0–199)
CO2: 24 MMOL/L (ref 21–32)
CREAT SERPL-MCNC: 1.1 MG/DL (ref 0.9–1.3)
EOSINOPHILS ABSOLUTE: 0.1 K/UL (ref 0–0.6)
EOSINOPHILS RELATIVE PERCENT: 3.1 %
GFR AFRICAN AMERICAN: >60
GFR NON-AFRICAN AMERICAN: >60
GLOBULIN: 1.9 G/DL
GLUCOSE BLD-MCNC: 106 MG/DL (ref 70–99)
HCT VFR BLD CALC: 40.3 % (ref 40.5–52.5)
HDLC SERPL-MCNC: 61 MG/DL (ref 40–60)
HEMOGLOBIN: 13.5 G/DL (ref 13.5–17.5)
LDL CHOLESTEROL CALCULATED: 93 MG/DL
LYMPHOCYTES ABSOLUTE: 1.1 K/UL (ref 1–5.1)
LYMPHOCYTES RELATIVE PERCENT: 26.2 %
MCH RBC QN AUTO: 31.7 PG (ref 26–34)
MCHC RBC AUTO-ENTMCNC: 33.5 G/DL (ref 31–36)
MCV RBC AUTO: 94.6 FL (ref 80–100)
MONOCYTES ABSOLUTE: 0.5 K/UL (ref 0–1.3)
MONOCYTES RELATIVE PERCENT: 13 %
NEUTROPHILS ABSOLUTE: 2.4 K/UL (ref 1.7–7.7)
NEUTROPHILS RELATIVE PERCENT: 56.8 %
PDW BLD-RTO: 14.2 % (ref 12.4–15.4)
PLATELET # BLD: 253 K/UL (ref 135–450)
PMV BLD AUTO: 7.7 FL (ref 5–10.5)
POTASSIUM SERPL-SCNC: 4.3 MMOL/L (ref 3.5–5.1)
RBC # BLD: 4.26 M/UL (ref 4.2–5.9)
SODIUM BLD-SCNC: 140 MMOL/L (ref 136–145)
TOTAL PROTEIN: 6.4 G/DL (ref 6.4–8.2)
TRIGL SERPL-MCNC: 102 MG/DL (ref 0–150)
VLDLC SERPL CALC-MCNC: 20 MG/DL
WBC # BLD: 4.2 K/UL (ref 4–11)

## 2020-11-04 PROCEDURE — 99214 OFFICE O/P EST MOD 30 MIN: CPT | Performed by: NURSE PRACTITIONER

## 2020-11-04 ASSESSMENT — PATIENT HEALTH QUESTIONNAIRE - PHQ9
SUM OF ALL RESPONSES TO PHQ QUESTIONS 1-9: 0
SUM OF ALL RESPONSES TO PHQ QUESTIONS 1-9: 0
SUM OF ALL RESPONSES TO PHQ9 QUESTIONS 1 & 2: 0
SUM OF ALL RESPONSES TO PHQ QUESTIONS 1-9: 0
1. LITTLE INTEREST OR PLEASURE IN DOING THINGS: 0
2. FEELING DOWN, DEPRESSED OR HOPELESS: 0

## 2020-11-04 NOTE — PROGRESS NOTES
Preoperative Consultation      Lc Taylor  YOB: 1965    Date of Service:  11/4/2020    Vitals:    11/04/20 1133   BP: 126/82   Site: Left Upper Arm   Position: Sitting   Cuff Size: Large Adult   Pulse: 87   Resp: 18   Temp: 98.1 °F (36.7 °C)   TempSrc: Infrared   SpO2: 97%   Weight: 263 lb (119.3 kg)   Height: 6' (1.829 m)      Wt Readings from Last 2 Encounters:   11/04/20 263 lb (119.3 kg)   03/06/20 297 lb (134.7 kg)     BP Readings from Last 3 Encounters:   11/04/20 126/82   03/06/20 134/82   01/30/20 138/86        Chief Complaint   Patient presents with    Pre-op Exam     PT HERE FOR PRE OP EXAM FOR HIS UPCOMING RIGHT KNEE SURGERY TO BE DONE ON 11/30/2020 AT [facility]     Allergies   Allergen Reactions    Dye  [Barium-Containing Compounds]      Other reaction(s): sneezing    Iodine Other (See Comments)     IVP dye reaction. Could not stop sneezing. Used a second time, and took benadryl prior and no issues.  Lisinopril Other (See Comments)     Cough       Outpatient Medications Marked as Taking for the 11/4/20 encounter (Office Visit) with JORGE ALBERTO Smith CNP   Medication Sig Dispense Refill    montelukast (SINGULAIR) 10 MG tablet TAKE 1 TABLET BY MOUTH DAILY 30 tablet 2    rosuvastatin (CRESTOR) 10 MG tablet TAKE 1 TABLET BY MOUTH DAILY 90 tablet 3    olmesartan (BENICAR) 40 MG tablet Take 1 tablet by mouth daily 30 tablet 5    gabapentin (NEURONTIN) 300 MG capsule Take 1-2 capsules by mouth nightly.       doxazosin (CARDURA) 8 MG tablet TAKE 1 TABLET BY MOUTH EVERY NIGHT 30 tablet 5    esomeprazole (NEXIUM) 40 MG delayed release capsule TAKE ONE CAPSULE BY MOUTH DAILY 30 capsule 5    chlorthalidone (HYGROTON) 25 MG tablet Take 0.5-1 tablets by mouth daily 30 tablet 5    buPROPion (WELLBUTRIN XL) 150 MG extended release tablet Take 1 tablet by mouth every morning 30 tablet 5    amLODIPine (NORVASC) 10 MG tablet 1 po daily 30 tablet 5    ondansetron (ZOFRAN ODT) 8 MG TBDP disintegrating tablet Take 1 tablet by mouth every 8 hours as needed for Nausea 20 tablet 2    Cholecalciferol (VITAMIN D) 2000 units TABS tablet Take 1 tablet by mouth daily After finishing 12 week couse 30 tablet 11    melatonin 5 MG TABS tablet Take 1-2 tablets by mouth nightly 60 tablet 2    aspirin (ASPIRIN LOW DOSE) 81 MG tablet Take 1 tablet by mouth daily 365 tablet 0       This patient presents to the office today for a preoperative consultation at the request of surgeon, Dr. Pattie Ceja, who plans on performing right knee arthoscopy with arthroscopic arthroplasty, Knee arthroscopy with partial medial and lateral menisectomy on November 30 at [facility]. The current problem began 1 year ago, and symptoms have been worsening with time. Conservative therapy: No.    Planned anesthesia: General   Known anesthesia problems: None   Bleeding risk: No recent or remote history of abnormal bleeding  Personal or FH of DVT/PE: No    Patient objection to receiving blood products: No    Patient Active Problem List   Diagnosis    Hypercholesteremia    Hypertension    Chronic rhinitis    Migraine    Diverticula of colon    Colon polyps    Vitamin D deficiency    MONISHA (obstructive sleep apnea)    Gilbert syndrome    Multiple renal cysts    Testosterone deficiency    Lipoma of anterior chest wall    Polymyalgia rheumatica (HCC)    Temporal arteritis (HCC)    Class 2 severe obesity due to excess calories with serious comorbidity and body mass index (BMI) of 35.0 to 35.9 in adult ([address]    Increased intraocular pressure    Polyarthritis    Elevated liver enzymes    Low testosterone       Past Medical History:   Diagnosis Date    Asthma     Colon polyps 06/21/2018    COLONOSCOPY, DR CARTER GARCIA, CECUM POLYP TUBULAR ADENOMAS.  Gastritis 06/21/2018    EGD, DR CARTER GARCIA, MILD CHRONIC GASTRITIS.     GERD (gastroesophageal reflux disease)     Hyperlipidemia     Hyperplastic rectal polyp 06/21/2018    COLONOSCOPY, DR CARTER GARCIA, HYPERPLASTIC POLYP RECTAL.     Hypertension     Increased intraocular pressure     Migraine     Pneumonia     x 3    Testosterone deficiency 4/6/2017     Past Surgical History:   Procedure Laterality Date    CHOLECYSTECTOMY, LAPAROSCOPIC  8-11-11    COLONOSCOPY  06/21/2018    COLONOSCOPY, DR CARTER GARCIA, COLON POLYPS X2, RECTAL POLYP X1.    NASAL SEPTUM SURGERY      and palatoplasty     Family History   Problem Relation Age of Onset    High Blood Pressure Mother     Cancer Father         testicular, multiple myeloma    Heart Attack Father 47        CABG 4V    Other Father         MRSA    No Known Problems Brother     Early Death Maternal Uncle      Social History     Socioeconomic History    Marital status:      Spouse name: Not on file    Number of children: Not on file    Years of education: Not on file    Highest education level: Not on file   Occupational History    Occupation:    Social Needs    Financial resource strain: Not on file    Food insecurity     Worry: Not on file     Inability: Not on file   [employer] needs     Medical: Not on file     Non-medical: Not on file   Tobacco Use    Smoking status: Never Smoker    Smokeless tobacco: Never Used   Substance and Sexual Activity    Alcohol use: Yes     Comment: rare    Drug use: No    Sexual activity: Not on file   Lifestyle    Physical activity     Days per week: Not on file     Minutes per session: Not on file    Stress: Not on file   Relationships    Social connections     Talks on phone: Not on file     Gets together: Not on file     Attends [employer] service: Not on file     Active member of club or organization: Not on file     Attends meetings of clubs or organizations: Not on file     Relationship status: Not on file    Intimate partner violence     Fear of current or ex partner: Not on file     Emotionally abused: Not on file     Physically abused: Not on file     Forced sexual activity: Not on file   Other Topics Concern    Not on file   Social History Narrative    [employer] 4x/wk. 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       Review of Systems  A comprehensive review of systems was negative except for what was noted in the HPI. Physical Exam   Constitutional: He is oriented to person, place, and time. He appears well-developed and well-nourished. No distress. HENT:   Head: Normocephalic and atraumatic. Mouth/Throat: Uvula is midline, oropharynx is clear and moist and mucous membranes are normal.   Eyes: Conjunctivae and EOM are normal. Pupils are equal, round, and reactive to light. Neck: Trachea normal and normal range of motion. Neck supple. No JVD present. Carotid bruit is not present. No mass and no thyromegaly present. Cardiovascular: Normal rate, regular rhythm, normal heart sounds and intact distal pulses. Exam reveals no gallop and no friction rub. No murmur heard. Pulmonary/Chest: Effort normal and breath sounds normal. No respiratory distress. He has no wheezes. He has no rales. Abdominal: Soft. Normal aorta and bowel sounds are normal. He exhibits no distension and no mass. There is no hepatosplenomegaly. No tenderness. Musculoskeletal: He exhibits joint tenderness. No edema. Neurological: He is alert and oriented to person, place, and time. He has normal strength. No cranial nerve deficit or sensory deficit. Coordination and gait normal.   Skin: Skin is warm and dry. No rash noted. No erythema. Psychiatric: He has a normal mood and affect. His behavior is normal.     EKG Interpretation:  Performed by cardiology and cleared. Lab Review   No visits with results within 6 Month(s) from this visit.    Latest known visit with results is:   Orders Only on 02/04/2020   Component Date Value    Testosterone 02/04/2020 598     Sex Hormone Binding 02/04/2020 12     Testosterone, Free 02/04/2020 195.5            Assessment:       48 y.o. patient with planned surgery as above. Known risk factors for perioperative complications: Hypertension, Obstructive sleep apnea  Current medications which may produce withdrawal symptoms if withheld perioperatively: none      Plan:     1. Preoperative workup as follows: hemoglobin, hematocrit, electrolytes, creatinine, glucose, liver function studies  2. Change in medication regimen before surgery: Discontinue NSAIDs (naproxen) 7 days before surgery  3. Prophylaxis for cardiac events with perioperative beta-blockers: Not indicated  ACC/AHA indications for pre-operative beta-blocker use:    · Vascular surgery with history of postitive stress test  · Intermediate or high risk surgery with history of CAD   · Intermediate or high risk surgery with multiple clinical predictors of CAD- 2 of the following: history of compensated or prior heart failure, history of cerebrovascular disease, DM, or renal insufficiency    Routine administration of higher-dose, long-acting metoprolol in beta-blocker-naïve patients on the day of surgery, and in the absence of dose titration is associated with an overall increase in mortality. Beta-blockers should be started days to weeks prior to surgery and titrated to pulse < 70.  4. Deep vein thrombosis prophylaxis: regimen to be chosen by surgical team  5.  No contraindications to planned surgery

## 2020-11-05 RX ORDER — AMLODIPINE BESYLATE 10 MG/1
TABLET ORAL
Qty: 30 TABLET | Refills: 5 | Status: SHIPPED | OUTPATIENT
Start: 2020-11-05 | End: 2021-06-16 | Stop reason: SDUPTHER

## 2020-11-06 LAB
SEX HORMONE BINDING GLOBULIN: 25 NMOL/L (ref 11–80)
TESTOSTERONE FREE-NONMALE: 67 PG/ML (ref 47–244)
TESTOSTERONE TOTAL: 293 NG/DL (ref 220–1000)

## 2020-11-17 RX ORDER — ESOMEPRAZOLE MAGNESIUM 40 MG/1
CAPSULE, DELAYED RELEASE ORAL
Qty: 30 CAPSULE | Refills: 5 | Status: SHIPPED | OUTPATIENT
Start: 2020-11-17 | End: 2021-06-16 | Stop reason: SDUPTHER

## 2020-11-17 RX ORDER — BUPROPION HYDROCHLORIDE 150 MG/1
150 TABLET ORAL EVERY MORNING
Qty: 30 TABLET | Refills: 5 | Status: SHIPPED | OUTPATIENT
Start: 2020-11-17 | End: 2021-03-19

## 2020-11-20 ENCOUNTER — TELEPHONE (OUTPATIENT)
Dept: FAMILY MEDICINE CLINIC | Age: 55
End: 2020-11-20

## 2020-11-20 NOTE — TELEPHONE ENCOUNTER
Pt wife calling said the surgeon's office said we could just change the date - and re fax it     Please let patient know if you will do this

## 2020-11-20 NOTE — TELEPHONE ENCOUNTER
Call the office and verify that they are fine with this. If they say it's fine then changing the date is acceptable.

## 2020-11-20 NOTE — TELEPHONE ENCOUNTER
THEY CALLED BACK THEY SAID IT IS UP TO [facility]  CARDIA CLEARANCE IS GOOD FOR 90 DAYS PHYSICAL TECHNICALLY IS ONLY GOOD FOR 30 DAYS BUT IF [facility] IS COMFORTABLE WITH IT IT IS OKAY. PAPER IS ON [employer] KEYBOARD.  HS

## 2020-11-20 NOTE — TELEPHONE ENCOUNTER
Pt just had a PRE-OP - KNEE SURGERY - [facility] DR. Salomon More  On 11/04 - his wife got Covid and it was cancelled - they need to reschedule - surgery will now be  12/07/20 -  Can someone work him in - or can you use the last pre-op visit       Bandar Muñoz  @  [phone]

## 2020-12-18 RX ORDER — DOXAZOSIN 8 MG/1
TABLET ORAL
Qty: 30 TABLET | Refills: 4 | Status: SHIPPED | OUTPATIENT
Start: 2020-12-18 | End: 2021-06-16 | Stop reason: SDUPTHER

## 2020-12-22 ENCOUNTER — APPOINTMENT (OUTPATIENT)
Dept: GENERAL RADIOLOGY | Age: 55
End: 2020-12-22
Payer: COMMERCIAL

## 2020-12-22 ENCOUNTER — HOSPITAL ENCOUNTER (EMERGENCY)
Age: 55
Discharge: HOME | End: 2020-12-22
Payer: COMMERCIAL

## 2020-12-22 VITALS
OXYGEN SATURATION: 100 % | DIASTOLIC BLOOD PRESSURE: 98 MMHG | TEMPERATURE: 98.3 F | HEART RATE: 81 BPM | RESPIRATION RATE: 15 BRPM | SYSTOLIC BLOOD PRESSURE: 132 MMHG

## 2020-12-22 PROCEDURE — 93971 EXTREMITY STUDY: CPT

## 2020-12-22 PROCEDURE — 99283 EMERGENCY DEPT VISIT LOW MDM: CPT

## 2020-12-22 PROCEDURE — 73560 X-RAY EXAM OF KNEE 1 OR 2: CPT

## 2020-12-22 ASSESSMENT — PAIN DESCRIPTION - PROGRESSION: CLINICAL_PROGRESSION: GRADUALLY WORSENING

## 2020-12-22 ASSESSMENT — PAIN DESCRIPTION - PAIN TYPE: TYPE: ACUTE PAIN

## 2020-12-22 ASSESSMENT — PAIN - FUNCTIONAL ASSESSMENT: PAIN_FUNCTIONAL_ASSESSMENT: PREVENTS OR INTERFERES SOME ACTIVE ACTIVITIES AND ADLS

## 2020-12-22 ASSESSMENT — PAIN DESCRIPTION - ORIENTATION: ORIENTATION: RIGHT

## 2020-12-22 ASSESSMENT — PAIN DESCRIPTION - FREQUENCY: FREQUENCY: CONTINUOUS

## 2020-12-22 ASSESSMENT — PAIN DESCRIPTION - LOCATION: LOCATION: KNEE

## 2020-12-22 ASSESSMENT — PAIN DESCRIPTION - DESCRIPTORS: DESCRIPTORS: DISCOMFORT;SQUEEZING

## 2020-12-22 ASSESSMENT — PAIN DESCRIPTION - ONSET: ONSET: PROGRESSIVE

## 2020-12-22 ASSESSMENT — PAIN SCALES - GENERAL: PAINLEVEL_OUTOF10: 3

## 2020-12-22 NOTE — ED PROVIDER NOTES
Surekha Alvarado is a 48 y.o. male presenting with pain slightly increasing right knee. No trauma. Seen by orthopedics. Patient states torn meniscus right knee which was repaired December 7. Postoperatively on December 14 he indicates he had progressive swelling. This joint was drained by orthopedic specialist with [facility]. He reports 20 mL fluid removed. No sign of infection. The knee is not warm or hot. There is some soreness. He indicates some mild weakness. He indicates a tight-like rubber band feeling above the knee when he flexes. This would relate to fluid within the joint such as clinical effusion. No distal paresthesia. He has some ecchymotic areas on the medial aspect of the distal right thigh. Indicates no specific lower leg swelling. No increased pain. No systemic ill complaints. Low suspicion for septic joint. Patient does report he has a study to obtain a venous duplex study rule out DVT set for December 29. He was told if condition worsens go to ER for a sooner than later venous Doppler study. I will place order for x-ray and Doppler study. Nursing Notes were all reviewed and agreed with or any disagreements were addressed in the HPI. REVIEW OF SYSTEMS    (2-9 systems for level 4, 10 or more for level 5)     Review of Systems    Positives and Pertinent negatives as per HPI. Except as noted above in the ROS, all other systems were reviewed and negative. PAST MEDICAL HISTORY     Past Medical History:   Diagnosis Date    Asthma     Colon polyps 06/21/2018    COLONOSCOPY, DR CARTER GARCIA, CECUM POLYP TUBULAR ADENOMAS.  Gastritis 06/21/2018    EGD, DR CARTER GARCIA, MILD CHRONIC GASTRITIS.  GERD (gastroesophageal reflux disease)     Hyperlipidemia     Hyperplastic rectal polyp 06/21/2018    COLONOSCOPY, DR CARTER GARCIA, HYPERPLASTIC POLYP RECTAL.     Hypertension     Increased intraocular pressure     Migraine  Pneumonia     x 3    Testosterone deficiency 4/6/2017         SURGICAL HISTORY     Past Surgical History:   Procedure Laterality Date    CHOLECYSTECTOMY, LAPAROSCOPIC  8-11-11    COLONOSCOPY  06/21/2018    COLONOSCOPY, DR CARTER GARCIA, COLON POLYPS X2, RECTAL POLYP X1.    KNEE SURGERY Right 12/07/2020    meniscus repair    NASAL SEPTUM SURGERY      and palatoplasty         CURRENTMEDICATIONS       Previous Medications    AMLODIPINE (NORVASC) 10 MG TABLET    TAKE 1 TABLET BY MOUTH DAILY    ASPIRIN (ASPIRIN LOW DOSE) 81 MG TABLET    Take 1 tablet by mouth daily    BUPROPION (WELLBUTRIN XL) 150 MG EXTENDED RELEASE TABLET    TAKE 1 TABLET BY MOUTH EVERY MORNING    CHLORTHALIDONE (HYGROTON) 25 MG TABLET    Take 0.5-1 tablets by mouth daily    CHOLECALCIFEROL (VITAMIN D) 2000 UNITS TABS TABLET    Take 1 tablet by mouth daily After finishing 12 week couse    DOXAZOSIN (CARDURA) 8 MG TABLET    TAKE 1 TABLET BY MOUTH EVERY NIGHT    ESCITALOPRAM (LEXAPRO) 20 MG TABLET    TAKE 1 TABLETS BY MOUTH DAILY    ESOMEPRAZOLE (NEXIUM) 40 MG DELAYED RELEASE CAPSULE    TAKE 1 CAPSULE BY MOUTH DAILY    GABAPENTIN (NEURONTIN) 300 MG CAPSULE    Take 1-2 capsules by mouth nightly.     MELATONIN 5 MG TABS TABLET    Take 1-2 tablets by mouth nightly    MONTELUKAST (SINGULAIR) 10 MG TABLET    TAKE 1 TABLET BY MOUTH DAILY    OLMESARTAN (BENICAR) 40 MG TABLET    Take 1 tablet by mouth daily    ONDANSETRON (ZOFRAN ODT) 8 MG TBDP DISINTEGRATING TABLET    Take 1 tablet by mouth every 8 hours as needed for Nausea    PREDNISONE (DELTASONE) 5 MG TABLET    Take 5 mg by mouth daily    ROSUVASTATIN (CRESTOR) 10 MG TABLET    TAKE 1 TABLET BY MOUTH DAILY    SULFASALAZINE (AZULFIDINE) 500 MG EC TABLET    Take 2 tablets by mouth 2 times daily    TESTOSTERONE CYPIONATE (DEPOTESTOTERONE CYPIONATE) 200 MG/ML INJECTION    Inject 200mg IM q14 days as directed    VALSARTAN (DIOVAN) 320 MG TABLET    Take 1 tablet by mouth daily         ALLERGIES Dye  [barium-containing compounds], Iodine, and Lisinopril    FAMILYHISTORY       Family History   Problem Relation Age of Onset    High Blood Pressure Mother     Cancer Father         testicular, multiple myeloma    Heart Attack Father 47        CABG 4V    Other Father         MRSA    No Known Problems Brother     Early Death Maternal Uncle           SOCIAL HISTORY       Social History     Tobacco Use    Smoking status: Never Smoker    Smokeless tobacco: Never Used   Substance Use Topics    Alcohol use: Yes     Comment: rare    Drug use: No       SCREENINGS             PHYSICAL EXAM    (up to 7 for level 4, 8 or more for level 5)     ED Triage Vitals [12/22/20 0827]   BP Temp Temp Source Pulse Resp SpO2 Height Weight   (!) 154/95 98.3 °F (36.8 °C) Oral 82 17 100 % -- --       Physical Exam  Vitals signs and nursing note reviewed. Constitutional:       Appearance: Normal appearance. He is well-developed and normal weight. HENT:      Head: Normocephalic and atraumatic. Right Ear: External ear normal.      Left Ear: External ear normal.   Eyes:      General: No scleral icterus. Right eye: No discharge. Left eye: No discharge. Conjunctiva/sclera: Conjunctivae normal.   Neck:      Musculoskeletal: Normal range of motion and neck supple. Cardiovascular:      Rate and Rhythm: Normal rate. Pulmonary:      Effort: Pulmonary effort is normal.   Musculoskeletal: Normal range of motion. General: Swelling and tenderness present. Comments: No overlying erythema or warmth. Joint stable. Patient did ambulate in. Patient does have small effusion noted. He does have ecchymotic area on the medial aspect of the distal right thigh. This does not involve the knee. Skin:     General: Skin is warm and dry. Neurological:      General: No focal deficit present. Mental Status: He is alert and oriented to person, place, and time. Mental status is at baseline. Psychiatric:         Mood and Affect: Mood normal.         Behavior: Behavior normal.         Thought Content: Thought content normal.         Judgment: Judgment normal.         DIAGNOSTIC RESULTS   LABS:    Labs Reviewed - No data to display    All other labs were within normal range or not returned as of this dictation. EKG: All EKG's are interpreted by the Emergency Department Physician in the absence of a cardiologist.  Please see their note for interpretation of EKG. RADIOLOGY:   Non-plain film images such as CT, Ultrasound and MRI are read by the radiologist. Plain radiographic images are visualized and preliminarily interpreted by the ED Provider with the below findings:        Interpretation per the Radiologist below, if available at the time of this note:    VL Extremity Venous Right         XR KNEE RIGHT (1-2 VIEWS)   Final Result   1. No acute abnormality. No results found.         PROCEDURES   Unless otherwise noted below, none     Procedures    CRITICAL CARE TIME   N/A    CONSULTS:  None      EMERGENCY DEPARTMENT COURSE and DIFFERENTIAL DIAGNOSIS/MDM:   Vitals:    Vitals:    12/22/20 0827   BP: (!) 154/95   Pulse: 82   Resp: 17   Temp: 98.3 °F (36.8 °C)   TempSrc: Oral   SpO2: 100%       Patient was given the following medications:  Medications - No data to display

## 2021-01-01 NOTE — TELEPHONE ENCOUNTER
PA SUBMITTED FOR Diethylpropion HCl ER 75MG er tablets  Key: [id number] - PA Case ID: [id number] - Rx #: [id number]   PENDING (1) Other Diagnosis

## 2021-01-25 RX ORDER — OLMESARTAN MEDOXOMIL 40 MG/1
40 TABLET ORAL DAILY
Qty: 30 TABLET | Refills: 4 | Status: SHIPPED | OUTPATIENT
Start: 2021-01-25 | End: 2021-06-16 | Stop reason: SDUPTHER

## 2021-03-17 ENCOUNTER — PATIENT MESSAGE (OUTPATIENT)
Dept: FAMILY MEDICINE CLINIC | Age: 56
End: 2021-03-17

## 2021-03-19 ENCOUNTER — OFFICE VISIT (OUTPATIENT)
Dept: FAMILY MEDICINE CLINIC | Age: 56
End: 2021-03-19
Payer: COMMERCIAL

## 2021-03-19 VITALS
TEMPERATURE: 98.1 F | RESPIRATION RATE: 12 BRPM | DIASTOLIC BLOOD PRESSURE: 82 MMHG | BODY MASS INDEX: 35.08 KG/M2 | HEIGHT: 72 IN | SYSTOLIC BLOOD PRESSURE: 136 MMHG | OXYGEN SATURATION: 96 % | HEART RATE: 91 BPM | WEIGHT: 259 LBS

## 2021-03-19 DIAGNOSIS — I10 ESSENTIAL HYPERTENSION: ICD-10-CM

## 2021-03-19 DIAGNOSIS — M25.562 CHRONIC PAIN OF LEFT KNEE: Primary | ICD-10-CM

## 2021-03-19 DIAGNOSIS — Z01.818 PREOP EXAMINATION: ICD-10-CM

## 2021-03-19 DIAGNOSIS — G89.29 CHRONIC PAIN OF LEFT KNEE: Primary | ICD-10-CM

## 2021-03-19 DIAGNOSIS — F41.9 ANXIETY: ICD-10-CM

## 2021-03-19 LAB
A/G RATIO: 2.2 (ref 1.1–2.2)
ALBUMIN SERPL-MCNC: 4.4 G/DL (ref 3.4–5)
ALP BLD-CCNC: 68 U/L (ref 40–129)
ALT SERPL-CCNC: 23 U/L (ref 10–40)
ANION GAP SERPL CALCULATED.3IONS-SCNC: 9 MMOL/L (ref 3–16)
AST SERPL-CCNC: 17 U/L (ref 15–37)
BASOPHILS ABSOLUTE: 0 K/UL (ref 0–0.2)
BASOPHILS RELATIVE PERCENT: 0.6 %
BILIRUB SERPL-MCNC: 0.6 MG/DL (ref 0–1)
BUN BLDV-MCNC: 22 MG/DL (ref 7–20)
CALCIUM SERPL-MCNC: 9.3 MG/DL (ref 8.3–10.6)
CHLORIDE BLD-SCNC: 103 MMOL/L (ref 99–110)
CO2: 29 MMOL/L (ref 21–32)
CREAT SERPL-MCNC: 1.2 MG/DL (ref 0.9–1.3)
EOSINOPHILS ABSOLUTE: 0.1 K/UL (ref 0–0.6)
EOSINOPHILS RELATIVE PERCENT: 2.2 %
GFR AFRICAN AMERICAN: >60
GFR NON-AFRICAN AMERICAN: >60
GLOBULIN: 2 G/DL
GLUCOSE BLD-MCNC: 112 MG/DL (ref 70–99)
HCT VFR BLD CALC: 40.5 % (ref 40.5–52.5)
HEMOGLOBIN: 13.8 G/DL (ref 13.5–17.5)
LYMPHOCYTES ABSOLUTE: 0.8 K/UL (ref 1–5.1)
LYMPHOCYTES RELATIVE PERCENT: 14.8 %
MCH RBC QN AUTO: 31.4 PG (ref 26–34)
MCHC RBC AUTO-ENTMCNC: 34.1 G/DL (ref 31–36)
MCV RBC AUTO: 92.1 FL (ref 80–100)
MONOCYTES ABSOLUTE: 0.7 K/UL (ref 0–1.3)
MONOCYTES RELATIVE PERCENT: 14.4 %
NEUTROPHILS ABSOLUTE: 3.5 K/UL (ref 1.7–7.7)
NEUTROPHILS RELATIVE PERCENT: 68 %
PDW BLD-RTO: 14.6 % (ref 12.4–15.4)
PLATELET # BLD: 241 K/UL (ref 135–450)
PMV BLD AUTO: 8.3 FL (ref 5–10.5)
POTASSIUM SERPL-SCNC: 4.4 MMOL/L (ref 3.5–5.1)
RBC # BLD: 4.4 M/UL (ref 4.2–5.9)
SODIUM BLD-SCNC: 141 MMOL/L (ref 136–145)
TOTAL PROTEIN: 6.4 G/DL (ref 6.4–8.2)
WBC # BLD: 5.1 K/UL (ref 4–11)

## 2021-03-19 PROCEDURE — 93000 ELECTROCARDIOGRAM COMPLETE: CPT | Performed by: NURSE PRACTITIONER

## 2021-03-19 PROCEDURE — 99214 OFFICE O/P EST MOD 30 MIN: CPT | Performed by: NURSE PRACTITIONER

## 2021-03-19 RX ORDER — BUPROPION HYDROCHLORIDE 150 MG/1
300 TABLET ORAL EVERY MORNING
Qty: 60 TABLET | Refills: 0
Start: 2021-03-19 | End: 2021-06-16 | Stop reason: SDUPTHER

## 2021-03-19 RX ORDER — CYCLOBENZAPRINE HCL 5 MG
TABLET ORAL
COMMUNITY
Start: 2021-01-31 | End: 2021-12-16 | Stop reason: ALTCHOICE

## 2021-03-19 RX ORDER — PHENTERMINE HYDROCHLORIDE 37.5 MG/1
TABLET ORAL
COMMUNITY
Start: 2021-02-18 | End: 2021-06-16

## 2021-03-19 RX ORDER — LEFLUNOMIDE 20 MG/1
20 TABLET ORAL DAILY
COMMUNITY
Start: 2021-01-20

## 2021-03-19 ASSESSMENT — PATIENT HEALTH QUESTIONNAIRE - PHQ9
SUM OF ALL RESPONSES TO PHQ9 QUESTIONS 1 & 2: 0
SUM OF ALL RESPONSES TO PHQ QUESTIONS 1-9: 0
SUM OF ALL RESPONSES TO PHQ QUESTIONS 1-9: 0

## 2021-03-19 NOTE — PROGRESS NOTES
capsules by mouth nightly.  ondansetron (ZOFRAN ODT) 8 MG TBDP disintegrating tablet Take 1 tablet by mouth every 8 hours as needed for Nausea 20 tablet 2    Cholecalciferol (VITAMIN D) 2000 units TABS tablet Take 1 tablet by mouth daily After finishing 12 week couse 30 tablet 11    melatonin 5 MG TABS tablet Take 1-2 tablets by mouth nightly 60 tablet 2       This patient presents to the office today for a preoperative consultation at the request of surgeon, Dr. Tanmay Mcnally, who plans on performing left knee arthroscopy with arthroscopic arthroplasty on April 12 at [facility]. The current problem began 1 year ago, and symptoms have been worsening with time. He had the right knee performed in December. He felt that the right knee improved after surgery. He has noted significant increase in pain to the left knee following previous surgery on the right, worse over past 2-3 weeks. Conservative therapy: No    Willam Dials would also like to discuss his anxiety medication. He currently takes Wellbutrin 150 mg daily. He does not feel like this is working anymore at this point. He did state that he tried to increase to 1/2 tablets daily at 1 point but did not feel much of a benefit. He is inquiring whether he could change to a different medication or whether he should adjust dose.     Planned anesthesia: General   Known anesthesia problems: None   Bleeding risk: No recent or remote history of abnormal bleeding  Personal or FH of DVT/PE: No    Patient objection to receiving blood products: No    Patient Active Problem List   Diagnosis    Hypercholesteremia    Hypertension    Chronic rhinitis    Migraine    Diverticula of colon    Colon polyps    Vitamin D deficiency    MONISHA (obstructive sleep apnea)    Gilbert syndrome    Multiple renal cysts    Testosterone deficiency    Lipoma of anterior chest wall    Polymyalgia rheumatica (HCC)    Temporal arteritis (HCC)    Class 2 severe obesity due to excess calories with serious comorbidity and body mass index (BMI) of 35.0 to 35.9 in adult [facility])    Increased intraocular pressure    Polyarthritis    Elevated liver enzymes    Low testosterone    Anxiety       Past Medical History:   Diagnosis Date    Asthma     Colon polyps 06/21/2018    COLONOSCOPY, DR CARTER GARCIA, CECUM POLYP TUBULAR ADENOMAS.  Gastritis 06/21/2018    EGD, DR CARTER GARCIA, MILD CHRONIC GASTRITIS.  GERD (gastroesophageal reflux disease)     Hyperlipidemia     Hyperplastic rectal polyp 06/21/2018    COLONOSCOPY, DR CARTER GARCIA, HYPERPLASTIC POLYP RECTAL.     Hypertension     Increased intraocular pressure     Migraine     Pneumonia     x 3    Testosterone deficiency 4/6/2017     Past Surgical History:   Procedure Laterality Date    CHOLECYSTECTOMY, LAPAROSCOPIC  8-11-11    COLONOSCOPY  06/21/2018    COLONOSCOPY, DR CARTER GARCIA, COLON POLYPS X2, RECTAL POLYP X1.    KNEE SURGERY Right 12/07/2020    meniscus repair    NASAL SEPTUM SURGERY      and palatoplasty     Family History   Problem Relation Age of Onset    High Blood Pressure Mother     Cancer Father         testicular, multiple myeloma    Heart Attack Father 47        CABG 4V    Other Father         MRSA    No Known Problems Brother     Early Death Maternal Uncle      Social History     Socioeconomic History    Marital status:      Spouse name: Not on file    Number of children: Not on file    Years of education: Not on file    Highest education level: Not on file   Occupational History    Occupation:    Social Needs    Financial resource strain: Not on file    Food insecurity     Worry: Not on file     Inability: Not on file   [employer] needs     Medical: Not on file     Non-medical: Not on file   Tobacco Use    Smoking status: Never Smoker    Smokeless tobacco: Never Used   Substance and Sexual Activity    Alcohol use: Yes     Comment: rare    Drug use: No    Sexual activity: Not on file   Lifestyle    Physical activity     Days per week: Not on file     Minutes per session: Not on file    Stress: Not on file   Relationships    Social connections     Talks on phone: Not on file     Gets together: Not on file     Attends Alevism service: Not on file     Active member of club or organization: Not on file     Attends meetings of clubs or organizations: Not on file     Relationship status: Not on file    Intimate partner violence     Fear of current or ex partner: Not on file     Emotionally abused: Not on file     Physically abused: Not on file     Forced sexual activity: Not on file   Other Topics Concern    Not on file   Social History Narrative    [employer] 4x/wk. 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       Review of Systems  A comprehensive review of systems was negative except for what was noted in the HPI. Physical Exam   Constitutional: He is oriented to person, place, and time. He appears well-developed and well-nourished. No distress. HENT:   Head: Normocephalic and atraumatic. Mouth/Throat: Uvula is midline, oropharynx is clear and moist and mucous membranes are normal.   Eyes: Conjunctivae and EOM are normal. Pupils are equal, round, and reactive to light. Neck: Trachea normal and normal range of motion. Neck supple. No JVD present. Carotid bruit is not present. No mass and no thyromegaly present. Cardiovascular: Normal rate, regular rhythm, normal heart sounds and intact distal pulses. Exam reveals no gallop and no friction rub. No murmur heard. Pulmonary/Chest: Effort normal and breath sounds normal. No respiratory distress. He has no wheezes. He has no rales. Abdominal: Soft. Normal aorta and bowel sounds are normal. He exhibits no distension and no mass. There is no hepatosplenomegaly. No tenderness. Musculoskeletal: He exhibits no edema and no tenderness. Neurological: He is alert and oriented to person, place, and time. He has normal strength. No cranial nerve deficit or sensory deficit. Coordination and gait normal.   Skin: Skin is warm and dry. No rash noted. No erythema. Psychiatric: He has a normal mood and affect. His behavior is normal.     EKG Interpretation:  normal EKG, normal sinus rhythm, unchanged from previous tracings.     Lab Review   Office Visit on 11/04/2020   Component Date Value    Cholesterol, Total 11/04/2020 174     Triglycerides 11/04/2020 102     HDL 11/04/2020 61*    LDL Calculated 11/04/2020 93     VLDL Cholesterol Calcula* 11/04/2020 20     Sodium 11/04/2020 140     Potassium 11/04/2020 4.3     Chloride 11/04/2020 104     CO2 11/04/2020 24     Anion Gap 11/04/2020 12     Glucose 11/04/2020 106*    BUN 11/04/2020 17     CREATININE 11/04/2020 1.1     GFR Non- 11/04/2020 >60     GFR  11/04/2020 >60     Calcium 11/04/2020 9.3     Total Protein 11/04/2020 6.4     Albumin 11/04/2020 4.5     Albumin/Globulin Ratio 11/04/2020 2.4*    Total Bilirubin 11/04/2020 0.8     Alkaline Phosphatase 11/04/2020 80     ALT 11/04/2020 39     AST 11/04/2020 28     Globulin 11/04/2020 1.9     Testosterone 11/04/2020 293     Sex Hormone Binding 11/04/2020 25     Testosterone, Free 11/04/2020 67.0     WBC 11/04/2020 4.2     RBC 11/04/2020 4.26     Hemoglobin 11/04/2020 13.5     Hematocrit 11/04/2020 40.3*    MCV 11/04/2020 94.6     MCH 11/04/2020 31.7     MCHC 11/04/2020 33.5     RDW 11/04/2020 14.2     Platelets 55/17/8525 253     MPV 11/04/2020 7.7     Neutrophils % 11/04/2020 56.8     Lymphocytes % 11/04/2020 26.2     Monocytes % 11/04/2020 13.0     Eosinophils % 11/04/2020 3.1     Basophils % 11/04/2020 0.9     Neutrophils Absolute 11/04/2020 2.4     Lymphocytes Absolute 11/04/2020 1.1     Monocytes Absolute 11/04/2020 0.5     Eosinophils Absolute 11/04/2020 0.1     Basophils Absolute 11/04/2020 0.0            Assessment:       50 y.o. patient with planned surgery as above. Known risk factors for perioperative complications: Hypertension, Obstructive sleep apnea  Current medications which may produce withdrawal symptoms if withheld perioperatively: none      Plan:   1. Chronic pain of left knee  -Cleared for surgery as long as cardiology does not require repeat evaluation. 2. Preop examination  - CBC Auto Differential; Future  - EKG 12 Lead  - CBC Auto Differential  1. Preoperative workup as follows: ECG, hemoglobin, hematocrit, electrolytes, creatinine, glucose, liver function studies  2. Change in medication regimen before surgery: Discontinue NSAIDs (naproxen) 7 days before surgery  3. Prophylaxis for cardiac events with perioperative beta-blockers: Not indicated  ACC/AHA indications for pre-operative beta-blocker use:    · Vascular surgery with history of postitive stress test  · Intermediate or high risk surgery with history of CAD   · Intermediate or high risk surgery with multiple clinical predictors of CAD- 2 of the following: history of compensated or prior heart failure, history of cerebrovascular disease, DM, or renal insufficiency    Routine administration of higher-dose, long-acting metoprolol in beta-blocker-naïve patients on the day of surgery, and in the absence of dose titration is associated with an overall increase in mortality. Beta-blockers should be started days to weeks prior to surgery and titrated to pulse < 70.  4. Deep vein thrombosis prophylaxis: regimen to be chosen by surgical team  5. No contraindications to planned surgery pending clearance from cardiology. They cleared him for the previous surgery. I have asked the patient to contact his cardiologist to determine if they require a repeat cardiac clearance or if they are willing to clear the patient without seeing him. 3. Essential hypertension  -Controlled on current regimen.   No changes required. - Comprehensive Metabolic Panel; Future  - Comprehensive Metabolic Panel    4. Anxiety  -Discussed options with patient. He will increase the Wellbutrin to 2 tablets daily. He will do this once the surgery is complete. GeneSight testing was discussed with the patient given that he has tried multiple agents without success. He will consider this once he figures out the cost with his insurance.       Follow-up in 6 months for hypertension

## 2021-04-06 ENCOUNTER — PATIENT MESSAGE (OUTPATIENT)
Dept: FAMILY MEDICINE CLINIC | Age: 56
End: 2021-04-06

## 2021-04-06 DIAGNOSIS — F51.04 PSYCHOPHYSIOLOGICAL INSOMNIA: ICD-10-CM

## 2021-04-06 RX ORDER — ZOLPIDEM TARTRATE 10 MG/1
10 TABLET ORAL NIGHTLY PRN
Qty: 30 TABLET | Refills: 0 | Status: SHIPPED | OUTPATIENT
Start: 2021-04-06 | End: 2021-05-12

## 2021-04-06 NOTE — TELEPHONE ENCOUNTER
From: Yancy Rider  To: Nat Hurst MD  Sent: 4/6/2021 9:09 AM EDT  Subject: Prescription Question    Sir,   Can you refill my zolpidem? I am in [facility]. I have not been able to sleep, even with melatonin. Can you call it in to [address] [place] [address] [address] [facility]. My family and I will be here until 4-9.

## 2021-05-12 DIAGNOSIS — F51.04 PSYCHOPHYSIOLOGICAL INSOMNIA: ICD-10-CM

## 2021-05-12 RX ORDER — ZOLPIDEM TARTRATE 10 MG/1
TABLET ORAL
Qty: 30 TABLET | Refills: 0 | Status: SHIPPED | OUTPATIENT
Start: 2021-05-12 | End: 2021-06-11

## 2021-06-16 ENCOUNTER — OFFICE VISIT (OUTPATIENT)
Dept: FAMILY MEDICINE CLINIC | Age: 56
End: 2021-06-16
Payer: COMMERCIAL

## 2021-06-16 VITALS
BODY MASS INDEX: 35.89 KG/M2 | HEIGHT: 72 IN | DIASTOLIC BLOOD PRESSURE: 82 MMHG | OXYGEN SATURATION: 97 % | WEIGHT: 265 LBS | TEMPERATURE: 97.4 F | HEART RATE: 76 BPM | SYSTOLIC BLOOD PRESSURE: 124 MMHG

## 2021-06-16 DIAGNOSIS — K21.9 GASTROESOPHAGEAL REFLUX DISEASE WITHOUT ESOPHAGITIS: ICD-10-CM

## 2021-06-16 DIAGNOSIS — E78.00 HYPERCHOLESTEREMIA: ICD-10-CM

## 2021-06-16 DIAGNOSIS — J30.2 SEASONAL ALLERGIES: ICD-10-CM

## 2021-06-16 DIAGNOSIS — I10 ESSENTIAL HYPERTENSION: Primary | ICD-10-CM

## 2021-06-16 DIAGNOSIS — F51.04 PSYCHOPHYSIOLOGICAL INSOMNIA: ICD-10-CM

## 2021-06-16 DIAGNOSIS — R39.14 BENIGN PROSTATIC HYPERPLASIA WITH INCOMPLETE BLADDER EMPTYING: ICD-10-CM

## 2021-06-16 DIAGNOSIS — F41.9 ANXIETY: ICD-10-CM

## 2021-06-16 DIAGNOSIS — N40.1 BENIGN PROSTATIC HYPERPLASIA WITH INCOMPLETE BLADDER EMPTYING: ICD-10-CM

## 2021-06-16 PROCEDURE — 99214 OFFICE O/P EST MOD 30 MIN: CPT | Performed by: NURSE PRACTITIONER

## 2021-06-16 RX ORDER — AMLODIPINE BESYLATE 10 MG/1
TABLET ORAL
Qty: 90 TABLET | Refills: 1 | Status: SHIPPED | OUTPATIENT
Start: 2021-06-16 | End: 2021-12-16 | Stop reason: SDUPTHER

## 2021-06-16 RX ORDER — BUPROPION HYDROCHLORIDE 150 MG/1
150 TABLET ORAL EVERY MORNING
Qty: 90 TABLET | Refills: 1 | Status: SHIPPED | OUTPATIENT
Start: 2021-06-16 | End: 2021-12-16 | Stop reason: SDUPTHER

## 2021-06-16 RX ORDER — ZOLPIDEM TARTRATE 10 MG/1
10 TABLET ORAL NIGHTLY PRN
Qty: 30 TABLET | Refills: 2 | Status: SHIPPED | OUTPATIENT
Start: 2021-06-16 | End: 2021-09-14

## 2021-06-16 RX ORDER — CETIRIZINE HYDROCHLORIDE 10 MG/1
10 TABLET ORAL DAILY
Qty: 90 TABLET | Refills: 1 | Status: SHIPPED | OUTPATIENT
Start: 2021-06-16 | End: 2021-12-16 | Stop reason: SDUPTHER

## 2021-06-16 RX ORDER — DOXAZOSIN 8 MG/1
TABLET ORAL
Qty: 90 TABLET | Refills: 1 | Status: SHIPPED | OUTPATIENT
Start: 2021-06-16

## 2021-06-16 RX ORDER — MONTELUKAST SODIUM 10 MG/1
TABLET ORAL
Qty: 90 TABLET | Refills: 1 | Status: SHIPPED | OUTPATIENT
Start: 2021-06-16 | End: 2021-12-16 | Stop reason: SDUPTHER

## 2021-06-16 RX ORDER — ROSUVASTATIN CALCIUM 10 MG/1
10 TABLET, COATED ORAL DAILY
Qty: 90 TABLET | Refills: 1 | Status: SHIPPED | OUTPATIENT
Start: 2021-06-16 | End: 2021-12-16 | Stop reason: SDUPTHER

## 2021-06-16 RX ORDER — ESOMEPRAZOLE MAGNESIUM 40 MG/1
CAPSULE, DELAYED RELEASE ORAL
Qty: 90 CAPSULE | Refills: 1 | Status: SHIPPED | OUTPATIENT
Start: 2021-06-16 | End: 2021-08-17 | Stop reason: ALTCHOICE

## 2021-06-16 RX ORDER — OLMESARTAN MEDOXOMIL 40 MG/1
40 TABLET ORAL DAILY
Qty: 90 TABLET | Refills: 1 | Status: SHIPPED | OUTPATIENT
Start: 2021-06-16 | End: 2022-03-28

## 2021-06-16 RX ORDER — ZOLPIDEM TARTRATE 10 MG/1
TABLET ORAL NIGHTLY PRN
COMMUNITY
End: 2021-06-16 | Stop reason: SDUPTHER

## 2021-06-16 SDOH — ECONOMIC STABILITY: FOOD INSECURITY: WITHIN THE PAST 12 MONTHS, YOU WORRIED THAT YOUR FOOD WOULD RUN OUT BEFORE YOU GOT MONEY TO BUY MORE.: NEVER TRUE

## 2021-06-16 SDOH — ECONOMIC STABILITY: FOOD INSECURITY: WITHIN THE PAST 12 MONTHS, THE FOOD YOU BOUGHT JUST DIDN'T LAST AND YOU DIDN'T HAVE MONEY TO GET MORE.: NEVER TRUE

## 2021-06-16 ASSESSMENT — ENCOUNTER SYMPTOMS
DIARRHEA: 0
COUGH: 0
SHORTNESS OF BREATH: 0
NAUSEA: 0
WHEEZING: 0
SINUS PRESSURE: 0
RHINORRHEA: 1
EYE DISCHARGE: 0
ABDOMINAL DISTENTION: 0
SINUS PAIN: 0
EYE ITCHING: 1
SORE THROAT: 0
VOMITING: 0
ABDOMINAL PAIN: 0
EYE PAIN: 0
EYE REDNESS: 0

## 2021-06-16 ASSESSMENT — SOCIAL DETERMINANTS OF HEALTH (SDOH): HOW HARD IS IT FOR YOU TO PAY FOR THE VERY BASICS LIKE FOOD, HOUSING, MEDICAL CARE, AND HEATING?: NOT HARD AT ALL

## 2021-06-16 NOTE — PROGRESS NOTES
Lazaro Fernandes (:  1965) is a 50 y.o. male,Established patient, here for evaluation of the following chief complaint(s):  Hypertension (ROUTINE HTN FOLLOW UP ), Other (DISCUSS ALLERGY MEDS- DOES NOT SEEM TO BE HELPING THIS YEAR. ), and Insomnia (WOULD LIKE TO DISCUSS HIS INSOMNIA AND HIS ZOLPIDEM )      ASSESSMENT/PLAN:  1. Essential hypertension  -Controlled on amlodipine and olmesartan. Due for refills. No changes today. Follow-up in 6 months with fasting labs. -     amLODIPine (NORVASC) 10 MG tablet; TAKE 1 TABLET BY MOUTH DAILY, Disp-90 tablet, R-1Normal  -     olmesartan (BENICAR) 40 MG tablet; Take 1 tablet by mouth daily, Disp-90 tablet, R-1Take in lieu of valsartanNormal  2. Psychophysiological insomnia  -Discussed options with patient. We will continue Ambien for now. He will try to be more consistent about taking the medication at appropriate time. Follow-up as needed if no improvement, otherwise 6 months.  -     zolpidem (AMBIEN) 10 MG tablet; Take 1 tablet by mouth nightly as needed for Sleep for up to 90 days. , Disp-30 tablet, R-2Normal  3. Anxiety  -Controlled on bupropion. No changes today. Due for refill.  -     buPROPion (WELLBUTRIN XL) 150 MG extended release tablet; Take 1 tablet by mouth every morning, Disp-90 tablet, R-1Normal  4. Hypercholesteremia  -Controlled on rosuvastatin. No changes today. Refill today. Due for labs in 6 months. -     rosuvastatin (CRESTOR) 10 MG tablet; Take 1 tablet by mouth daily, Disp-90 tablet, R-1Normal  5. Benign prostatic hyperplasia with incomplete bladder emptying  -Controlled with doxazosin. Due for refill.  -     doxazosin (CARDURA) 8 MG tablet; TAKE 1 TABLET BY MOUTH EVERY NIGHT, Disp-90 tablet, R-1Normal  6. Gastroesophageal reflux disease without esophagitis  -Controlled on Nexium. Due for refill.  -     esomeprazole (NEXIUM) 40 MG delayed release capsule; TAKE 1 CAPSULE BY MOUTH DAILY, Disp-90 capsule, R-1Normal  7.  Seasonal medications. He states that he needs refills on all of them. Is going to be going on occasion at the beginning of July and would like to  medications before he goes. Review of Systems   Constitutional: Negative for chills and fever. HENT: Positive for postnasal drip and rhinorrhea. Negative for ear discharge, ear pain, hearing loss, sinus pressure, sinus pain and sore throat. Eyes: Positive for itching. Negative for pain, discharge and redness. Respiratory: Negative for cough, shortness of breath and wheezing. Cardiovascular: Negative for chest pain and palpitations. Gastrointestinal: Negative for abdominal distention, abdominal pain, diarrhea, nausea and vomiting. Genitourinary: Negative for dysuria and hematuria. Musculoskeletal: Negative for myalgias. Skin: Negative for rash. Neurological: Negative for dizziness, weakness, light-headedness, numbness and headaches. Psychiatric/Behavioral: Positive for sleep disturbance. Negative for decreased concentration and dysphoric mood. The patient is not nervous/anxious. Physical Exam  Vitals reviewed. Constitutional:       Appearance: Normal appearance. He is normal weight. HENT:      Head: Normocephalic and atraumatic. Right Ear: Tympanic membrane, ear canal and external ear normal.      Left Ear: Tympanic membrane, ear canal and external ear normal.      Nose: Nose normal.      Mouth/Throat:      Mouth: Mucous membranes are moist.      Pharynx: Oropharynx is clear. No posterior oropharyngeal erythema. Eyes:      General: No scleral icterus. Right eye: No discharge. Left eye: No discharge. Extraocular Movements: Extraocular movements intact. Pupils: Pupils are equal, round, and reactive to light. Cardiovascular:      Rate and Rhythm: Normal rate and regular rhythm. Pulses: Normal pulses. Heart sounds: Normal heart sounds. No murmur heard. No gallop.     Pulmonary:      Effort: Pulmonary effort is normal.      Breath sounds: Normal breath sounds. No wheezing. Abdominal:      General: Bowel sounds are normal.      Palpations: Abdomen is soft. Tenderness: There is no abdominal tenderness. There is no guarding or rebound. Musculoskeletal:         General: Normal range of motion. Cervical back: Normal range of motion and neck supple. Skin:     General: Skin is warm and dry. Capillary Refill: Capillary refill takes less than 2 seconds. Neurological:      Mental Status: He is alert and oriented to person, place, and time. Mental status is at baseline. Psychiatric:         Mood and Affect: Mood normal.         Behavior: Behavior normal.         Thought Content: Thought content normal.         Judgment: Judgment normal.           On this date 6/16/2021 I have spent 30 minutes reviewing previous notes, test results and face to face with the patient discussing the diagnosis and importance of compliance with the treatment plan as well as documenting on the day of the visit. This dictation was generated by voice recognition computer software. Although all attempts are made to edit the dictation for accuracy, there may be errors in the transcription that are not intended. An electronic signature was used to authenticate this note.     --JORGE ALBERTO Cifuentes - CNP

## 2021-07-30 DIAGNOSIS — R73.03 PREDIABETES: ICD-10-CM

## 2021-07-30 RX ORDER — BLOOD-GLUCOSE METER
1 KIT MISCELLANEOUS 2 TIMES DAILY
Qty: 100 EACH | Refills: 3 | Status: SHIPPED | OUTPATIENT
Start: 2021-07-30

## 2021-07-30 NOTE — TELEPHONE ENCOUNTER
PT'S WIFE STOPPED BY BECAUSE HE SAW THE ONCOLOGIST AND HIS FASTING BS . HE HAS A HISTORY OF PREDIABETES. PT IS OUT OF TEST STRIPS. SHE WOULD LIKE THE PT TO FOLLOW UP WITH DR CAIN. I GAVE HER INFORMATION REGARDING DIABETES TO SHARE WITH HER  TO HELP WITH HIS DIET. I ATTEMPTED TO CALL HIM TO SCHEDULE AN APPT BUT WAS UNABLE TO REACH. I LMVM FOR THE PT TO  TO SCHEDULE AN APPT WITH DR CAIN TO DISCUSS FURTHER. DR CAIN WOULD LIKE THE PT TO COME IN FOR A LAB APPT FOR A HGBA1C BUT I WILL DISCUSS IT WITH HIM WHEN HE CALLS BACK. PER KAYCEE, OK TO REFILL DIABETES TEST STRIPS TO [facility]. PT'S WIFE AGREED WITH THE PLAN. [address]    RX SENT TO THE PHARMACY.   [address] [facility]

## 2021-08-02 ENCOUNTER — NURSE ONLY (OUTPATIENT)
Dept: FAMILY MEDICINE CLINIC | Age: 56
End: 2021-08-02
Payer: COMMERCIAL

## 2021-08-02 DIAGNOSIS — R73.9 ELEVATED BLOOD SUGAR: Primary | ICD-10-CM

## 2021-08-02 LAB — HBA1C MFR BLD: 5.9 %

## 2021-08-02 PROCEDURE — 83036 HEMOGLOBIN GLYCOSYLATED A1C: CPT | Performed by: FAMILY MEDICINE

## 2021-08-02 NOTE — PROGRESS NOTES
POCT HGBA1C PERFORMED, WITHIN NORMAL RANGE. PT INFORMED.   [address] [facility]

## 2021-08-17 ENCOUNTER — OFFICE VISIT (OUTPATIENT)
Dept: FAMILY MEDICINE CLINIC | Age: 56
End: 2021-08-17
Payer: COMMERCIAL

## 2021-08-17 VITALS
BODY MASS INDEX: 37.65 KG/M2 | WEIGHT: 278 LBS | HEART RATE: 78 BPM | SYSTOLIC BLOOD PRESSURE: 130 MMHG | DIASTOLIC BLOOD PRESSURE: 74 MMHG | HEIGHT: 72 IN

## 2021-08-17 DIAGNOSIS — F41.9 ANXIETY: ICD-10-CM

## 2021-08-17 DIAGNOSIS — E55.9 VITAMIN D DEFICIENCY: ICD-10-CM

## 2021-08-17 DIAGNOSIS — E34.9 TESTOSTERONE DEFICIENCY: ICD-10-CM

## 2021-08-17 DIAGNOSIS — R74.8 ELEVATED LIVER ENZYMES: ICD-10-CM

## 2021-08-17 DIAGNOSIS — R63.5 WEIGHT GAIN: ICD-10-CM

## 2021-08-17 DIAGNOSIS — E78.00 HYPERCHOLESTEREMIA: ICD-10-CM

## 2021-08-17 DIAGNOSIS — G47.33 OSA (OBSTRUCTIVE SLEEP APNEA): ICD-10-CM

## 2021-08-17 DIAGNOSIS — G47.00 INSOMNIA, UNSPECIFIED TYPE: ICD-10-CM

## 2021-08-17 DIAGNOSIS — I10 ESSENTIAL HYPERTENSION: Primary | ICD-10-CM

## 2021-08-17 DIAGNOSIS — J31.0 CHRONIC RHINITIS: ICD-10-CM

## 2021-08-17 DIAGNOSIS — M13.0 POLYARTHRITIS: ICD-10-CM

## 2021-08-17 PROBLEM — E66.01 CLASS 2 SEVERE OBESITY DUE TO EXCESS CALORIES WITH SERIOUS COMORBIDITY AND BODY MASS INDEX (BMI) OF 35.0 TO 35.9 IN ADULT (HCC): Status: RESOLVED | Noted: 2018-10-22 | Resolved: 2021-08-17

## 2021-08-17 PROBLEM — E66.812 CLASS 2 SEVERE OBESITY DUE TO EXCESS CALORIES WITH SERIOUS COMORBIDITY AND BODY MASS INDEX (BMI) OF 35.0 TO 35.9 IN ADULT: Status: RESOLVED | Noted: 2018-10-22 | Resolved: 2021-08-17

## 2021-08-17 PROCEDURE — 99214 OFFICE O/P EST MOD 30 MIN: CPT | Performed by: FAMILY MEDICINE

## 2021-08-17 RX ORDER — HYDROXYCHLOROQUINE SULFATE 200 MG/1
200 TABLET, FILM COATED ORAL 2 TIMES DAILY
COMMUNITY
Start: 2021-07-27

## 2021-08-17 NOTE — PROGRESS NOTES
[facility] Medicine  Clinic Note    Date: 8/17/2021                                               Subjective:     Chief Complaint   Patient presents with    Hypertension     HTN ROUTINE FOLLOW UP      HPI  Had knee surgery each knee in past - scope including baker cyst removal  Limited activity w/ knee issues  Some numbness in legs - gabapentin/ flexeril helps some  RA?/ inflammatory polyarthritis - rheum 7/27 note reviewed - ? Temporal arteritis in past - seen at [address] [place] [address] clinic in past.  Used voltaren or pennsaid solution  Uses compression wraps on knees which helps  Wearing police equipment also seems to effect knees. Trying to pack lunch - trying to eat more veggies and a lot of chicken - limiting soda  Trying atkins shakes  Stopped sulfasalazine as less predictable response  AWARE of weight gain. Working day shift now since knee surgery.   Feels better on this schedule  2000 units d daily - checked recently? - believes it was over 30  No cp/palp/ sob  No significant swelling in ankles  Left leg may swell w/ flare  Using cpap/ zolpidem -working better for sleep - adjusting to different shift - never real good sleep  Looking at getting booster       Patient Active Problem List    Diagnosis Date Noted    Anxiety 03/19/2021    Polyarthritis 11/01/2019    Elevated liver enzymes 11/01/2019    Low testosterone 11/01/2019    Increased intraocular pressure     Class 2 severe obesity due to excess calories with serious comorbidity and body mass index (BMI) of 35.0 to 35.9 in adult [facility]) 10/22/2018    Polymyalgia rheumatica ([facility] [address] 08/03/2018    Temporal arteritis ([facility] [address] 08/03/2018    Lipoma of anterior chest wall 06/28/2018    Testosterone deficiency 04/06/2017    Gilbert syndrome 12/27/2016    Multiple renal cysts 12/27/2016    MONISHA (obstructive sleep apnea)     Vitamin D deficiency 06/03/2014    Diverticula of colon 03/28/2014    Colon polyps 03/28/2014    Hypercholesteremia 01/27/2014    Hypertension 01/27/2014    Chronic rhinitis 01/27/2014    Migraine 01/27/2014     Past Medical History:   Diagnosis Date    Asthma     Colon polyps 06/21/2018    COLONOSCOPY, DR CARTER GARCIA, CECUM POLYP TUBULAR ADENOMAS.  Gastritis 06/21/2018    EGD, DR CARTER GARCIA, MILD CHRONIC GASTRITIS.  GERD (gastroesophageal reflux disease)     Hyperlipidemia     Hyperplastic rectal polyp 06/21/2018    COLONOSCOPY, DR CARTER GARCIA, HYPERPLASTIC POLYP RECTAL.     Hypertension     Increased intraocular pressure     Migraine     Pneumonia     x 3    Testosterone deficiency 4/6/2017     Past Surgical History:   Procedure Laterality Date    CHOLECYSTECTOMY, LAPAROSCOPIC  8-11-11    COLONOSCOPY  06/21/2018    COLONOSCOPY, DR CARTER GARCIA, COLON POLYPS X2, RECTAL POLYP X1.    KNEE SURGERY Right 12/07/2020    meniscus repair    NASAL SEPTUM SURGERY      and palatoplasty     Nurse Only on 08/02/2021   Component Date Value Ref Range Status    Hemoglobin A1C 08/02/2021 5.9  % Final     Family History   Problem Relation Age of Onset    High Blood Pressure Mother     Cancer Father         testicular, multiple myeloma    Heart Attack Father 47        CABG 4V    Other Father         MRSA    No Known Problems Brother     Early Death Maternal Uncle      Current Outpatient Medications   Medication Sig Dispense Refill    hydroxychloroquine (PLAQUENIL) 200 MG tablet Take 200 mg by mouth 2 times daily      blood glucose test strips (FREESTYLE LITE) strip 1 each by In Vitro route 2 times daily Indications: Impaired Glucose Tolerance Routinely due to elevated blood sugars 100 each 3    cetirizine (ZYRTEC) 10 MG tablet Take 1 tablet by mouth daily 90 tablet 1    rosuvastatin (CRESTOR) 10 MG tablet Take 1 tablet by mouth daily 90 tablet 1    montelukast (SINGULAIR) 10 MG tablet TAKE 1 TABLET BY MOUTH DAILY 90 tablet 1    amLODIPine (NORVASC) 10 MG tablet TAKE 1 TABLET BY MOUTH DAILY 90 tablet 1    buPROPion (WELLBUTRIN XL) 150 MG extended release tablet Take 1 tablet by mouth every morning 90 tablet 1    olmesartan (BENICAR) 40 MG tablet Take 1 tablet by mouth daily 90 tablet 1    doxazosin (CARDURA) 8 MG tablet TAKE 1 TABLET BY MOUTH EVERY NIGHT 90 tablet 1    zolpidem (AMBIEN) 10 MG tablet Take 1 tablet by mouth nightly as needed for Sleep for up to 90 days. 30 tablet 2    leflunomide (ARAVA) 20 MG tablet Take 20 mg by mouth daily      cyclobenzaprine (FLEXERIL) 5 MG tablet TAKE 1 TABLET BY MOUTH THREE TIMES DAILY      gabapentin (NEURONTIN) 300 MG capsule Take 1-2 capsules by mouth nightly.  ondansetron (ZOFRAN ODT) 8 MG TBDP disintegrating tablet Take 1 tablet by mouth every 8 hours as needed for Nausea 20 tablet 2    Cholecalciferol (VITAMIN D) 2000 units TABS tablet Take 1 tablet by mouth daily After finishing 12 week couse 30 tablet 11    melatonin 5 MG TABS tablet Take 1-2 tablets by mouth nightly 60 tablet 2    aspirin (ASPIRIN LOW DOSE) 81 MG tablet Take 1 tablet by mouth daily 365 tablet 0     No current facility-administered medications for this visit. Allergies   Allergen Reactions    Dye  [Barium-Containing Compounds]      Other reaction(s): sneezing    Iodine Other (See Comments)     IVP dye reaction. Could not stop sneezing. Used a second time, and took benadryl prior and no issues.      Lisinopril Other (See Comments)     Cough         Review of Systems    Objective:  /74 (Site: Left Upper Arm, Position: Sitting, Cuff Size: Large Adult)   Pulse 78   Ht 6' (1.829 m)   Wt 278 lb (126.1 kg)   BMI 37.70 kg/m²     BP Readings from Last 3 Encounters:   08/17/21 130/74   06/16/21 124/82   03/19/21 136/82       Pulse Readings from Last 3 Encounters:   08/17/21 78   06/16/21 76   03/19/21 91       Wt Readings from Last 3 Encounters:   08/17/21 278 lb (126.1 kg)   06/16/21 265 lb (120.2 kg)   03/19/21 259 lb (117.5 kg)       Physical Exam  Constitutional:       General: He is not in acute distress. Appearance: He is well-developed. Eyes:      General: No scleral icterus. Conjunctiva/sclera: Conjunctivae normal.   Cardiovascular:      Rate and Rhythm: Normal rate and regular rhythm. Heart sounds: Normal heart sounds. No murmur heard. No gallop. Pulmonary:      Effort: Pulmonary effort is normal. No respiratory distress. Breath sounds: Normal breath sounds. No wheezing, rhonchi or rales. Abdominal:      General: Bowel sounds are normal. There is no distension. Palpations: Abdomen is soft. Tenderness: There is no abdominal tenderness. Musculoskeletal:         General: No swelling. Skin:     Findings: No erythema or rash. Neurological:      Mental Status: He is alert. Assessment/Plan:      Diagnosis Orders   1. Essential hypertension     2. Hypercholesteremia     3. Anxiety     4. Testosterone deficiency     5. Vitamin D deficiency     6. MONISHA (obstructive sleep apnea)     7. Polyarthritis     8. Elevated liver enzymes     9. Chronic rhinitis     10.  Insomnia, unspecified type     cont monisha/ ambien  F/u hem-onc routinely for leukopenia  Regular eye exam encouraged - reviewed labs  Recent a1c 5.9%  Diet/ activity dw/ pt eligio low sugar/ processed carbs eligio w/ weight increase  bp seems fairly well controlled on norvasc/ cardura  F/u rheum for inflammatory polyarthrtis - dr. Yahaira Gonsalez  On arava/ plaquenil  On gabapentin for pain/ sleep  oarrs reviewed and results c/w rx'd meds  6/16 last ambien  Refill when needed  covid booster soon  Jose Peck MD, MD  8/17/2021  11:30 AM

## 2021-08-18 ENCOUNTER — PATIENT MESSAGE (OUTPATIENT)
Dept: FAMILY MEDICINE CLINIC | Age: 56
End: 2021-08-18

## 2021-08-18 NOTE — TELEPHONE ENCOUNTER
From: Christianne Jeffers  To: Scarlet Echavarria MD  Sent: 8/18/2021 7:54 AM EDT  Subject: Non-Urgent Medical Question    Sir, I don't know if I asked or not but I would like to try saxenda if that is a possibility please.

## 2021-08-18 NOTE — TELEPHONE ENCOUNTER
Saxenda sent to pharmacy, but not likely covered by insurance and it is expensive. May be able to find some cost savings cards or programs online.

## 2021-08-23 ENCOUNTER — TELEPHONE (OUTPATIENT)
Dept: ADMINISTRATIVE | Age: 56
End: 2021-08-23

## 2021-08-23 NOTE — TELEPHONE ENCOUNTER
Submitted PA for Saxenda 18MG/3ML pen-injectors Key: [id number]. Via [facility] Status APPROVED Medication has been approved through 12/31/2021. Please notify patient. Thank you.

## 2021-08-31 RX ORDER — ESCITALOPRAM OXALATE 20 MG/1
TABLET ORAL
Qty: 90 TABLET | Refills: 3 | OUTPATIENT
Start: 2021-08-31

## 2021-08-31 NOTE — TELEPHONE ENCOUNTER
LV 8/17/21 NV 12/16/21   escitalopram (LEXAPRO) 20 MG tablet (Discontinued) 90 tablet 3 4/1/2020 3/19/2021    Sig: TAKE 1 TABLETS BY MOUTH DAILY    Patient not taking: Reported on 11/4/2020        Sent to pharmacy as: Escitalopram Oxalate 20 MG Oral Tablet (LEXAPRO)    Reason for Discontinue: LIST CLEANUP    E-Prescribing Status: Receipt confirmed by pharmacy (4/1/2020 10:32 AM EDT)

## 2021-09-29 ENCOUNTER — PATIENT MESSAGE (OUTPATIENT)
Dept: FAMILY MEDICINE CLINIC | Age: 56
End: 2021-09-29

## 2021-09-29 DIAGNOSIS — G47.00 INSOMNIA, UNSPECIFIED TYPE: Primary | ICD-10-CM

## 2021-09-29 RX ORDER — SUVOREXANT 20 MG/1
TABLET, FILM COATED ORAL
Qty: 30 TABLET | Refills: 2 | Status: SHIPPED | OUTPATIENT
Start: 2021-09-29 | End: 2021-10-29

## 2021-09-29 NOTE — TELEPHONE ENCOUNTER
From: Pooja Downey  To: Sandrita Bowser MD  Sent: 9/29/2021 8:41 AM EDT  Subject: Prescription Question    Sir, we previously discussed [address] and if possible I would like to switch to that. I'm done with my current bottle of zolpdiem.

## 2021-10-04 ENCOUNTER — TELEPHONE (OUTPATIENT)
Dept: ADMINISTRATIVE | Age: 56
End: 2021-10-04

## 2021-10-04 NOTE — TELEPHONE ENCOUNTER
Submitted PA for Belsomra 20MG tablets, Key: [id number]. Medication has been APPROVED through 10/04/2022. Will scan approval letter once received. Please notify patient. Thank you.

## 2021-12-02 ENCOUNTER — PATIENT MESSAGE (OUTPATIENT)
Dept: FAMILY MEDICINE CLINIC | Age: 56
End: 2021-12-02

## 2021-12-02 DIAGNOSIS — I10 ESSENTIAL HYPERTENSION: ICD-10-CM

## 2021-12-02 DIAGNOSIS — R73.03 PREDIABETES: ICD-10-CM

## 2021-12-02 DIAGNOSIS — Z12.5 PROSTATE CANCER SCREENING: ICD-10-CM

## 2021-12-02 DIAGNOSIS — E55.9 VITAMIN D DEFICIENCY: Primary | ICD-10-CM

## 2021-12-02 DIAGNOSIS — E78.00 HYPERCHOLESTEREMIA: ICD-10-CM

## 2021-12-02 NOTE — TELEPHONE ENCOUNTER
From: Najma Sims  To: Dr. Leydi Thompson: 2021 10:58 AM EST  Subject: Non-Urgent Medical Question    Sir , is it possible to get a nurses appointment to get blood work done prior to our next appointment on Dec 16th. I ask because of some Healthy Lifestyles and insurance things that I am now aware is best completed before the . I thought that I was due for my cholesterol also . 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 Thank you .

## 2021-12-09 ENCOUNTER — NURSE ONLY (OUTPATIENT)
Dept: FAMILY MEDICINE CLINIC | Age: 56
End: 2021-12-09
Payer: COMMERCIAL

## 2021-12-09 DIAGNOSIS — Z12.5 PROSTATE CANCER SCREENING: ICD-10-CM

## 2021-12-09 DIAGNOSIS — E78.00 HYPERCHOLESTEREMIA: ICD-10-CM

## 2021-12-09 DIAGNOSIS — E55.9 VITAMIN D DEFICIENCY: ICD-10-CM

## 2021-12-09 DIAGNOSIS — I10 ESSENTIAL HYPERTENSION: ICD-10-CM

## 2021-12-09 DIAGNOSIS — R73.03 PREDIABETES: ICD-10-CM

## 2021-12-09 LAB
A/G RATIO: 2.7 (ref 1.1–2.2)
ALBUMIN SERPL-MCNC: 4.3 G/DL (ref 3.4–5)
ALP BLD-CCNC: 70 U/L (ref 40–129)
ALT SERPL-CCNC: 22 U/L (ref 10–40)
ANION GAP SERPL CALCULATED.3IONS-SCNC: 14 MMOL/L (ref 3–16)
AST SERPL-CCNC: 17 U/L (ref 15–37)
BILIRUB SERPL-MCNC: 0.7 MG/DL (ref 0–1)
BUN BLDV-MCNC: 18 MG/DL (ref 7–20)
CALCIUM SERPL-MCNC: 9.4 MG/DL (ref 8.3–10.6)
CHLORIDE BLD-SCNC: 106 MMOL/L (ref 99–110)
CHOLESTEROL, TOTAL: 182 MG/DL (ref 0–199)
CO2: 25 MMOL/L (ref 21–32)
CREAT SERPL-MCNC: 1.3 MG/DL (ref 0.9–1.3)
GFR AFRICAN AMERICAN: >60
GFR NON-AFRICAN AMERICAN: 58
GLUCOSE BLD-MCNC: 107 MG/DL (ref 70–99)
HDLC SERPL-MCNC: 72 MG/DL (ref 40–60)
LDL CHOLESTEROL CALCULATED: 85 MG/DL
POTASSIUM SERPL-SCNC: 3.9 MMOL/L (ref 3.5–5.1)
PROSTATE SPECIFIC ANTIGEN: 0.43 NG/ML (ref 0–4)
SODIUM BLD-SCNC: 145 MMOL/L (ref 136–145)
TOTAL PROTEIN: 5.9 G/DL (ref 6.4–8.2)
TRIGL SERPL-MCNC: 125 MG/DL (ref 0–150)
VITAMIN D 25-HYDROXY: 31 NG/ML
VLDLC SERPL CALC-MCNC: 25 MG/DL

## 2021-12-09 PROCEDURE — 36415 COLL VENOUS BLD VENIPUNCTURE: CPT | Performed by: FAMILY MEDICINE

## 2021-12-10 LAB
ESTIMATED AVERAGE GLUCOSE: 122.6 MG/DL
HBA1C MFR BLD: 5.9 %

## 2021-12-16 ENCOUNTER — OFFICE VISIT (OUTPATIENT)
Dept: FAMILY MEDICINE CLINIC | Age: 56
End: 2021-12-16
Payer: COMMERCIAL

## 2021-12-16 ENCOUNTER — PATIENT MESSAGE (OUTPATIENT)
Dept: FAMILY MEDICINE CLINIC | Age: 56
End: 2021-12-16

## 2021-12-16 VITALS
WEIGHT: 270 LBS | HEART RATE: 75 BPM | SYSTOLIC BLOOD PRESSURE: 138 MMHG | OXYGEN SATURATION: 97 % | HEIGHT: 72 IN | DIASTOLIC BLOOD PRESSURE: 86 MMHG | BODY MASS INDEX: 36.57 KG/M2

## 2021-12-16 DIAGNOSIS — R22.2 CLAVICULAR AREA FULLNESS: Primary | ICD-10-CM

## 2021-12-16 DIAGNOSIS — G47.00 INSOMNIA, UNSPECIFIED TYPE: ICD-10-CM

## 2021-12-16 DIAGNOSIS — E78.00 HYPERCHOLESTEREMIA: ICD-10-CM

## 2021-12-16 DIAGNOSIS — E34.9 TESTOSTERONE DEFICIENCY: ICD-10-CM

## 2021-12-16 DIAGNOSIS — M13.0 POLYARTHRITIS: ICD-10-CM

## 2021-12-16 DIAGNOSIS — I10 ESSENTIAL HYPERTENSION: Primary | ICD-10-CM

## 2021-12-16 DIAGNOSIS — F41.9 ANXIETY: ICD-10-CM

## 2021-12-16 DIAGNOSIS — J30.2 SEASONAL ALLERGIES: ICD-10-CM

## 2021-12-16 DIAGNOSIS — Q61.02 MULTIPLE RENAL CYSTS: ICD-10-CM

## 2021-12-16 DIAGNOSIS — E80.4 GILBERT SYNDROME: ICD-10-CM

## 2021-12-16 DIAGNOSIS — M35.3 POLYMYALGIA RHEUMATICA (HCC): ICD-10-CM

## 2021-12-16 DIAGNOSIS — G47.33 OSA (OBSTRUCTIVE SLEEP APNEA): ICD-10-CM

## 2021-12-16 PROCEDURE — 99214 OFFICE O/P EST MOD 30 MIN: CPT | Performed by: FAMILY MEDICINE

## 2021-12-16 RX ORDER — BUPROPION HYDROCHLORIDE 150 MG/1
150 TABLET ORAL EVERY MORNING
Qty: 30 TABLET | Refills: 5 | Status: SHIPPED | OUTPATIENT
Start: 2021-12-16 | End: 2022-03-16

## 2021-12-16 RX ORDER — CETIRIZINE HYDROCHLORIDE 10 MG/1
10 TABLET ORAL DAILY
Qty: 30 TABLET | Refills: 5 | Status: SHIPPED | OUTPATIENT
Start: 2021-12-16

## 2021-12-16 RX ORDER — SPIRONOLACTONE 50 MG/1
50 TABLET, FILM COATED ORAL DAILY
Qty: 30 TABLET | Refills: 3 | Status: SHIPPED | OUTPATIENT
Start: 2021-12-16 | End: 2022-03-16

## 2021-12-16 RX ORDER — AMLODIPINE BESYLATE 10 MG/1
TABLET ORAL
Qty: 30 TABLET | Refills: 5 | Status: SHIPPED | OUTPATIENT
Start: 2021-12-16

## 2021-12-16 RX ORDER — ROSUVASTATIN CALCIUM 10 MG/1
10 TABLET, COATED ORAL DAILY
Qty: 30 TABLET | Refills: 5 | Status: SHIPPED | OUTPATIENT
Start: 2021-12-16

## 2021-12-16 RX ORDER — MONTELUKAST SODIUM 10 MG/1
TABLET ORAL
Qty: 30 TABLET | Refills: 5 | Status: SHIPPED | OUTPATIENT
Start: 2021-12-16

## 2021-12-16 NOTE — TELEPHONE ENCOUNTER
From: Orin Fernandez  To: Dr. Camara [place]: 12/16/2021 2:23 PM EST  Subject: Collarbone Lipoma    Sir, I now remember what I forgot to ask you. The lump on my collarbone is firmer an moves and depresses much less. No pain just irritation but some pain only when I have had an RA flare.

## 2021-12-16 NOTE — PROGRESS NOTES
[facility] Medicine  Clinic Note    Date: 12/16/2021                                               Subjective:     Chief Complaint   Patient presents with    Hypertension     6 MO HTN ROUTINE FOLLOW UP     Insomnia     INSOMNIA ROUTINE FOLLO WUP      HPI      Labs from 12/14 reviewed - bs 160  Recent labs in office cr 1.3  Seeing rheum routinely for polyarthritis/ pmr/ TA? - reviewed 12/14 note  11/1 appt cardio - dr. Lyndal Babinski  psa okay and vit d  a1c 5.9%  bp fluctuating - usually 140/90 - up to 170 at rheum office 2 days ago but has had sbp down to 111 - hr has dropped to 30's but usually in 70's  Had too much diuresis on chlorthalidone  Wbc low but ?  Related to arava - seen by oh for this  Seen by eye doctor for ? scleritis - also w/ more nail changes  On prednisone recently  No psoriatic lesions  Watching carbs some in diet  belsomra helping sleep - not using cpap regularly  Took awhile to get used to belsomra - takes awhile to kick in  BP Readings from Last 3 Encounters:   12/16/21 138/86   08/17/21 130/74   06/16/21 124/82     Pulse Readings from Last 3 Encounters:   12/16/21 75   08/17/21 78   06/16/21 76     Wt Readings from Last 3 Encounters:   12/16/21 270 lb (122.5 kg)   08/17/21 278 lb (126.1 kg)   06/16/21 265 lb (120.2 kg)   gained about 10# w/ all prednisone         Patient Active Problem List    Diagnosis Date Noted    Insomnia 08/17/2021    Anxiety 03/19/2021    Polyarthritis 11/01/2019    Elevated liver enzymes 11/01/2019    Low testosterone 11/01/2019    Increased intraocular pressure     Polymyalgia rheumatica ([facility]) 08/03/2018    Temporal arteritis ([address] 08/03/2018    Lipoma of anterior chest wall 06/28/2018    Testosterone deficiency 04/06/2017    Gilbert syndrome 12/27/2016    Multiple renal cysts 12/27/2016    MONISHA (obstructive sleep apnea)     Vitamin D deficiency 06/03/2014    Diverticula of colon 03/28/2014    Colon polyps 03/28/2014    Hypercholesteremia 01/27/2014  Hypertension 01/27/2014    Chronic rhinitis 01/27/2014    Migraine 01/27/2014     Past Medical History:   Diagnosis Date    Asthma     Colon polyps 06/21/2018    COLONOSCOPY, DR CARTER GARCIA, CECUM POLYP TUBULAR ADENOMAS.  Gastritis 06/21/2018    EGD, DR CARTER GARCIA, MILD CHRONIC GASTRITIS.  GERD (gastroesophageal reflux disease)     Hyperlipidemia     Hyperplastic rectal polyp 06/21/2018    COLONOSCOPY, DR CARTER GARCIA, HYPERPLASTIC POLYP RECTAL.  Hypertension     Increased intraocular pressure     Migraine     Pneumonia     x 3    Testosterone deficiency 4/6/2017     Past Surgical History:   Procedure Laterality Date    CHOLECYSTECTOMY, LAPAROSCOPIC  8-11-11    COLONOSCOPY  06/21/2018    COLONOSCOPY, DR CARTER GARCIA, COLON POLYPS X2, RECTAL POLYP X1.    KNEE SURGERY Right 12/07/2020    meniscus repair    NASAL SEPTUM SURGERY      and palatoplasty     Nurse Only on 12/09/2021   Component Date Value Ref Range Status    Hemoglobin A1C 12/09/2021 5.9  See comment % Final    Comment: Comment:  Diagnosis of Diabetes: > or = 6.5%  Increased risk of diabetes (Prediabetes): 5.7-6.4%  Glycemic Control: Nonpregnant Adults: <7.0%                    Pregnant: <6.0%        eAG 12/09/2021 122.6  mg/dL Final    Vit D, 25-Hydroxy 12/09/2021 31.0  >=30 ng/mL Final    Comment: <=20 ng/mL. ........... Ivonne Bloodgood Deficient  21-29 ng/mL. ......... Ivonne Bloodgood Insufficient  >=30 ng/mL. ........ Ivonne Bloodgood Sufficient      PSA 12/09/2021 0.43  0.00 - 4.00 ng/mL Final    Sodium 12/09/2021 145  136 - 145 mmol/L Final    Potassium 12/09/2021 3.9  3.5 - 5.1 mmol/L Final    Chloride 12/09/2021 106  99 - 110 mmol/L Final    CO2 12/09/2021 25  21 - 32 mmol/L Final    Anion Gap 12/09/2021 14  3 - 16 Final    Glucose 12/09/2021 107* 70 - 99 mg/dL Final    BUN 12/09/2021 18  7 - 20 mg/dL Final    CREATININE 12/09/2021 1.3  0.9 - 1.3 mg/dL Final    GFR Non- 12/09/2021 58* >60 Final Comment: >60 mL/min/1.73m2 EGFR, calc. for ages 25 and older using the  MDRD formula (not corrected for weight), is valid for stable  renal function.  GFR  12/09/2021 >60  >60 Final    Comment: Chronic Kidney Disease: less than 60 ml/min/1.73 sq.m. Kidney Failure: less than 15 ml/min/1.73 sq.m. Results valid for patients 18 years and older.  Calcium 12/09/2021 9.4  8.3 - 10.6 mg/dL Final    Total Protein 12/09/2021 5.9* 6.4 - 8.2 g/dL Final    Albumin 12/09/2021 4.3  3.4 - 5.0 g/dL Final    Albumin/Globulin Ratio 12/09/2021 2.7* 1.1 - 2.2 Final    Total Bilirubin 12/09/2021 0.7  0.0 - 1.0 mg/dL Final    Alkaline Phosphatase 12/09/2021 70  40 - 129 U/L Final    ALT 12/09/2021 22  10 - 40 U/L Final    AST 12/09/2021 17  15 - 37 U/L Final    Cholesterol, Total 12/09/2021 182  0 - 199 mg/dL Final    Triglycerides 12/09/2021 125  0 - 150 mg/dL Final    HDL 12/09/2021 72* 40 - 60 mg/dL Final    LDL Calculated 12/09/2021 85  <100 mg/dL Final    VLDL Cholesterol Calculated 12/09/2021 25  Not Established mg/dL Final     Family History   Problem Relation Age of Onset    High Blood Pressure Mother     Cancer Father         testicular, multiple myeloma    Heart Attack Father 47        CABG 4V    Other Father         MRSA    No Known Problems Brother     Early Death Maternal Uncle      Current Outpatient Medications   Medication Sig Dispense Refill    Suvorexant 20 MG TABS Take by mouth daily.       liraglutide-weight management 18 MG/3ML SOPN 0,6mg sc daily for 1 week then 1.2mg sc daily for 1 week then 1.8mg daily for 1 week then 2.4mg daily for 1 week then 3mg daily 15 mL 2    hydroxychloroquine (PLAQUENIL) 200 MG tablet Take 200 mg by mouth 2 times daily      blood glucose test strips (FREESTYLE LITE) strip 1 each by In Vitro route 2 times daily Indications: Impaired Glucose Tolerance Routinely due to elevated blood sugars 100 each 3    cetirizine (ZYRTEC) 10 MG tablet Take 1 tablet by mouth daily 90 tablet 1    rosuvastatin (CRESTOR) 10 MG tablet Take 1 tablet by mouth daily 90 tablet 1    montelukast (SINGULAIR) 10 MG tablet TAKE 1 TABLET BY MOUTH DAILY 90 tablet 1    amLODIPine (NORVASC) 10 MG tablet TAKE 1 TABLET BY MOUTH DAILY 90 tablet 1    buPROPion (WELLBUTRIN XL) 150 MG extended release tablet Take 1 tablet by mouth every morning 90 tablet 1    olmesartan (BENICAR) 40 MG tablet Take 1 tablet by mouth daily 90 tablet 1    doxazosin (CARDURA) 8 MG tablet TAKE 1 TABLET BY MOUTH EVERY NIGHT 90 tablet 1    leflunomide (ARAVA) 20 MG tablet Take 20 mg by mouth daily      ondansetron (ZOFRAN ODT) 8 MG TBDP disintegrating tablet Take 1 tablet by mouth every 8 hours as needed for Nausea 20 tablet 2    Cholecalciferol (VITAMIN D) 2000 units TABS tablet Take 1 tablet by mouth daily After finishing 12 week couse 30 tablet 11    aspirin (ASPIRIN LOW DOSE) 81 MG tablet Take 1 tablet by mouth daily 365 tablet 0    gabapentin (NEURONTIN) 300 MG capsule Take 1-2 capsules by mouth nightly. (Patient not taking: Reported on 12/16/2021)       No current facility-administered medications for this visit. Allergies   Allergen Reactions    Dye  [Barium-Containing Compounds]      Other reaction(s): sneezing    Iodine Other (See Comments)     IVP dye reaction. Could not stop sneezing. Used a second time, and took benadryl prior and no issues.      Lisinopril Other (See Comments)     Cough         Review of Systems    Objective:  /86 (Site: Left Upper Arm, Position: Sitting, Cuff Size: Medium Adult)   Pulse 75   Ht 6' (1.829 m)   Wt 270 lb (122.5 kg)   SpO2 97%   BMI 36.62 kg/m²     BP Readings from Last 3 Encounters:   12/16/21 138/86   08/17/21 130/74   06/16/21 124/82       Pulse Readings from Last 3 Encounters:   12/16/21 75   08/17/21 78   06/16/21 76       Wt Readings from Last 3 Encounters:   12/16/21 270 lb (122.5 kg)   08/17/21 278 lb (126.1 kg)   06/16/21 265 lb (120.2 kg)       Physical Exam  Constitutional:       General: He is not in acute distress. Appearance: He is well-developed. Eyes:      General: No scleral icterus. Pulmonary:      Effort: Pulmonary effort is normal.   Musculoskeletal:         General: No swelling. Skin:     General: Skin is warm and dry. Neurological:      Mental Status: He is alert and oriented to person, place, and time. Assessment/Plan:      Diagnosis Orders   1. Essential hypertension     2. Seasonal allergies     3. Hypercholesteremia     4. Anxiety     5. Testosterone deficiency     6. MONISHA (obstructive sleep apnea)     7. Multiple renal cysts     8. Gilbert syndrome     9. Polymyalgia rheumatica ([facility] [address]     10. Polyarthritis     11. Insomnia, unspecified type     cont monisha/ belsomra as seems to be helping though took awhile to take effect  F/u hem-onc routinely for leukopenia - ?  Related to arava  Regular eye exam utd - reviewed labs done recently w/ pt  Recent a1c 5.9% - stable / monitor  Diet/ activity dw/ pt eligio low sugar/ processed carbs eligio w/ weight increase  bp seems not  well controlled on norvasc/ cardura/ benicar - consider add aldactone 50 daily - se dw/ pt - K 3.9 - will need to monitor  wellbutrin helping mood some - cont for now  F/u rheum for inflammatory polyarthrtis - dr. Madeline Mortimer  On arava/ plaquenil w/ recent prednisone for scleritis - seeing eye doctor regularly  On gabapentin for pain/ sleep  oarrs reviewed and results c/w rx'd meds - belsomra 11/23 fill   utd on covid booster - holding on flu - good on tdap - consider shingrix  Zyrtec for allergies/ itch w/ singulair  F/u 3 months w/ bp change    Karlie Almeida MD, MD  12/16/2021  8:17 AM

## 2021-12-27 ENCOUNTER — PATIENT MESSAGE (OUTPATIENT)
Dept: FAMILY MEDICINE CLINIC | Age: 56
End: 2021-12-27

## 2021-12-27 ENCOUNTER — HOSPITAL ENCOUNTER (OUTPATIENT)
Age: 56
Discharge: HOME | End: 2021-12-27
Payer: COMMERCIAL

## 2021-12-27 ENCOUNTER — HOSPITAL ENCOUNTER (OUTPATIENT)
Dept: GENERAL RADIOLOGY | Age: 56
Discharge: HOME | End: 2021-12-27
Payer: COMMERCIAL

## 2021-12-27 DIAGNOSIS — D17.1 LIPOMA OF TORSO: Primary | ICD-10-CM

## 2021-12-27 DIAGNOSIS — R22.2 CLAVICULAR AREA FULLNESS: ICD-10-CM

## 2021-12-27 PROCEDURE — 73000 X-RAY EXAM OF COLLAR BONE: CPT

## 2021-12-28 NOTE — TELEPHONE ENCOUNTER
From: Amber Herrera  To: Dr. Sade Palomo: 12/27/2021 6:59 PM EST  Subject: Lipoma    Sir , at this time it measures 2.5 inches wide and 1 inch high. It runs diagonal with the lowest point toward my chest, highest toward my left shoulder. I will measure again before I come to see you. I'll make an appointment for approximately 1 month. It's difficult to discern if there has been growth but it's definitely firmer and less spongy than before but it can still be moved slightly.

## 2022-01-03 ENCOUNTER — INITIAL CONSULT (OUTPATIENT)
Dept: SURGERY | Age: 57
End: 2022-01-03

## 2022-01-03 DIAGNOSIS — D17.1 LIPOMA OF TORSO: Primary | ICD-10-CM

## 2022-01-03 PROCEDURE — 99999 PR OFFICE/OUTPT VISIT,PROCEDURE ONLY: CPT | Performed by: SURGERY

## 2022-01-03 NOTE — PATIENT INSTRUCTIONS
Lipoma removal scheduled for 1/21/22 at 9:30 arrive at 8. Nothing to eat or drink after midnight. You will need someone to bring you home.

## 2022-01-03 NOTE — PROGRESS NOTES
Diana Garcia (:  1965) is a 49 y.o. male,New patient, here for evaluation of the following chief complaint(s):  Surgical Consult (Pt is here today for a consultation, referred by Dr Isabelle Holt, for a lipoma in torso. )         ASSESSMENT/PLAN:  1. Lipoma of torso    Removal in OR under MAC    The risks, benefits and alternatives to the planned procedure were discussed. Patient expressed an understanding and is willing to proceed. Subjective   SUBJECTIVE/OBJECTIVE:  HPI  4 cm lipoma left upper chest. Will plan removal in OR under sedation    Review of Systems       Objective   Physical Exam       Electronically signed by Laly Campbell MD on 1/3/2022 at 3:23 PM        An electronic signature was used to authenticate this note.     --Laly Campbell MD

## 2022-01-03 NOTE — LETTER
[address] [facility] Vascular [facility]  [facility] [address]  [facility] [address]  Phone: [phone]  Fax: [phone]    19 Sarai Herrmann MD    January 3, 2022     Letty Garcia, [address] [place] [address]    Patient: Leslie Rivera   MR Number: [id number]   YOB: 1965   Date of Visit: 1/3/2022       Dear Letty Garcia: Thank you for referring Leslie Rivera to me for evaluation/treatment. Below are the relevant portions of my assessment and plan of care. ASSESSMENT/PLAN:  1. Lipoma of torso    Removal in OR under sedation    The risks, benefits and alternatives to the planned procedure were discussed. Patient expressed an understanding and is willing to proceed. If you have questions, please do not hesitate to call me. I look forward to following Tad Dyson along with you.     Sincerely,      Tay Herrmann MD

## 2022-01-05 ENCOUNTER — PATIENT MESSAGE (OUTPATIENT)
Dept: FAMILY MEDICINE CLINIC | Age: 57
End: 2022-01-05

## 2022-01-05 RX ORDER — SEMAGLUTIDE 1 MG/.5ML
1 INJECTION, SOLUTION SUBCUTANEOUS
Qty: 2 ML | Refills: 0 | Status: SHIPPED | OUTPATIENT
Start: 2022-01-05 | End: 2022-02-08

## 2022-01-05 NOTE — TELEPHONE ENCOUNTER
From: Gretchen Avilez  To: Dr. Vinod Allen: 1/5/2022 10:51 AM EST  Subject: Surgery    Lipoma removal and biopsy scheduled for January 21st [facility]. Also, have had issues with the saxendaas I moved up with fluctuating bouts of either constipation or diarrhea. If I switch, is wegovy an option since it's similar and only weekly. Or would I have to wait to switch to another option.

## 2022-01-10 ENCOUNTER — TELEPHONE (OUTPATIENT)
Dept: ADMINISTRATIVE | Age: 57
End: 2022-01-10

## 2022-01-10 NOTE — TELEPHONE ENCOUNTER
Submitted PA for [facility] 1MG/0.5ML auto-injectors  Via [facility] Key: [id number] STATUS:APPROVED. Medication has been approved through 06/27/2022. If this requires a response please respond to the pool. [address])    Please advise patient. Thank you.

## 2022-01-14 ENCOUNTER — TELEPHONE (OUTPATIENT)
Dept: SURGERY | Age: 57
End: 2022-01-14

## 2022-01-14 DIAGNOSIS — D17.1 LIPOMA OF TORSO: Primary | ICD-10-CM

## 2022-01-14 RX ORDER — SEMAGLUTIDE 1 MG/.5ML
1 INJECTION, SOLUTION SUBCUTANEOUS
COMMUNITY
End: 2022-03-16

## 2022-01-14 NOTE — PROGRESS NOTES
[address] [facility] time_____0755_______        Surgery time____________  1185  Take the following medications with a sip of water:    Do not eat or drink anything after 12:00 midnight prior to your surgery. This includes water chewing gum, mints and ice chips. You may brush your teeth and gargle the morning of your surgery, but do not swallow the water     Please see your family doctor/pediatrician for a history and physical and/or concerning medications. Bring any test results/reports from your physicians office. If you are under the care of a heart doctor or specialist doctor, please be aware that you may be asked to them for clearance    You may be asked to stop blood thinners such as Coumadin, Plavix, Fragmin, Lovenox, etc., or any anti-inflammatories such as:  Aspirin, Ibuprofen, Advil, Naproxen prior to your surgery. We also ask that you stop any OTC medications such as fish oil, vitamin E, glucosamine, garlic, Multivitamins, COQ 10, etc.    We ask that you do not smoke 24 hours prior to surgery  We ask that you do not  drink any alcoholic beverages 24 hours prior to surgery     You must make arrangements for a responsible adult to take you home after your surgery. For your safety you will not be allowed to leave alone or drive yourself home. Your surgery will be cancelled if you do not have a ride home. Also for your safety, it is strongly suggested that someone stay with you the first 24 hours after your surgery. A parent or legal guardian must accompany a child scheduled for surgery and plan to stay at the hospital until the child is discharged. Please do not bring other children with you. For your comfort, please wear simple loose fitting clothing to the hospital.  Please do not bring valuables.     Do not wear any make-up or nail polish on your fingers or toes      For your safety, please do not wear any jewelry or body piercing's on the day of surgery. All jewelry must be removed. If you have dentures, they will be removed before going to operating room. For your convenience, we will provide you with a container. If you wear contact lenses or glasses, they will be removed, please bring a case for them. If you have a living will and a durable power of  for healthcare, please bring in a copy. As part of our patient safety program to minimize surgical site infections, we ask you to do the following:    · Please notify your surgeon if you develop any illness between         now and the  day of your surgery. · This includes a cough, cold, fever, sore throat, nausea,         or vomiting, and diarrhea, etc.  ·  Please notify your surgeon if you experience dizziness, shortness         of breath or blurred vision between now and the time of your surgery. Do not shave your operative site 96 hours prior to surgery. For face and neck surgery, men may use an electric razor 48 hours   prior to surgery. You may shower the night before surgery or the morning of   your surgery with an antibacterial soap. You will need to bring a photo ID and insurance card    [facility] has an onsite pharmacy, would you like to utilize our pharmacy     If you will be staying overnight and use a C-pap machine, please bring   your C-pap to hospital     Our goal is to provide you with excellent care, therefore, visitors will be limited to two(2) in the room at a time so that we may focus on providing this care for you. Please contact pre-admission testing if you have any further questions. [facility] phone number:  [address] [facility] fax number:  [phone]  Please note these are generalized instructions for all surgical cases, you may be provided with more specific instructions according to your surgery.

## 2022-01-14 NOTE — PROGRESS NOTES
Preoperative Screening for Elective Surgery/Invasive Procedures While COVID-19 present in the community     1. Have you tested positive or have been told to self-isolate for COVID-19 like symptoms within the past 28 days?no  2. Do you currently have any of the following symptoms?no  ? Fever >100.0 F or 99.9 F in immunocompromised patients? ? New onset cough, shortness of breath or difficulty breathing? ? New onset sore throat, myalgia (muscle aches and pains), headache, loss of taste/smell or diarrhea? 3. Have you had a potential exposure to COVID-19 within the past 14 days by:no  ? Close contact with a confirmed case? ? Close contact with a healthcare worker,  or essential infrastructure worker (grocery store, [employer], gas station, public utilities or transportation)?no  ? Do you reside in a congregate setting such as; skilled nursing facility, adult home, correctional facility, homeless shelter or other institutional setting? ? Have you had recent travel to a known COVID-19 hotspot? Indicate if the patient has a positive screen by answering yes to one or more of the above questions.

## 2022-01-14 NOTE — TELEPHONE ENCOUNTER
Spoke with patient regarding scheduled surgery on 01- with Dr. Evangelina Henao. Patient is asked to arrive by 8:00 AM @ [address] after midnight. May take any Heart or Blood Pressure medication with a small sip of water to wash them down. Hold Aspirin on the morning of surgery. You will need someone to drive you home following this procedure. Please bring a Photo ID & Insurance card with you, and check-in at the Surgery Desk down the right-hand hallway on the first floor. Patient will obtain his Covid Test on 1/17/2022. Surgery is scheduled to start at approx. 9:25 Am and should take approx. 45 minutes. If the hospital needs any further information, someone will give you a call. Patient expressed a verbal understanding of these instructions and had no further questions at this time. Call ended.

## 2022-01-17 DIAGNOSIS — D17.1 LIPOMA OF TORSO: ICD-10-CM

## 2022-01-18 LAB — SARS-COV-2: NOT DETECTED

## 2022-01-19 ENCOUNTER — ANESTHESIA EVENT (OUTPATIENT)
Dept: OPERATING ROOM | Age: 57
End: 2022-01-19
Payer: COMMERCIAL

## 2022-01-21 ENCOUNTER — HOSPITAL ENCOUNTER (OUTPATIENT)
Age: 57
Setting detail: OUTPATIENT SURGERY
Discharge: HOME | End: 2022-01-21
Attending: SURGERY | Admitting: SURGERY
Payer: COMMERCIAL

## 2022-01-21 ENCOUNTER — ANESTHESIA (OUTPATIENT)
Dept: OPERATING ROOM | Age: 57
End: 2022-01-21
Payer: COMMERCIAL

## 2022-01-21 VITALS
OXYGEN SATURATION: 97 % | HEART RATE: 74 BPM | HEIGHT: 73 IN | WEIGHT: 282.63 LBS | SYSTOLIC BLOOD PRESSURE: 160 MMHG | BODY MASS INDEX: 37.46 KG/M2 | TEMPERATURE: 97 F | RESPIRATION RATE: 16 BRPM | DIASTOLIC BLOOD PRESSURE: 84 MMHG

## 2022-01-21 VITALS — DIASTOLIC BLOOD PRESSURE: 59 MMHG | SYSTOLIC BLOOD PRESSURE: 127 MMHG | TEMPERATURE: 97.5 F | OXYGEN SATURATION: 97 %

## 2022-01-21 DIAGNOSIS — D17.1 LIPOMA OF TORSO: ICD-10-CM

## 2022-01-21 PROCEDURE — 2580000003 HC RX 258: Performed by: SURGERY

## 2022-01-21 PROCEDURE — 3700000001 HC ADD 15 MINUTES (ANESTHESIA): Performed by: SURGERY

## 2022-01-21 PROCEDURE — 3700000000 HC ANESTHESIA ATTENDED CARE: Performed by: SURGERY

## 2022-01-21 PROCEDURE — 7100000001 HC PACU RECOVERY - ADDTL 15 MIN: Performed by: SURGERY

## 2022-01-21 PROCEDURE — 2500000003 HC RX 250 WO HCPCS: Performed by: SURGERY

## 2022-01-21 PROCEDURE — 3600000002 HC SURGERY LEVEL 2 BASE: Performed by: SURGERY

## 2022-01-21 PROCEDURE — 21552 EXC NECK LES SC 3 CM/>: CPT | Performed by: SURGERY

## 2022-01-21 PROCEDURE — 3600000012 HC SURGERY LEVEL 2 ADDTL 15MIN: Performed by: SURGERY

## 2022-01-21 PROCEDURE — 2580000003 HC RX 258: Performed by: STUDENT IN AN ORGANIZED HEALTH CARE EDUCATION/TRAINING PROGRAM

## 2022-01-21 PROCEDURE — 6360000002 HC RX W HCPCS: Performed by: SURGERY

## 2022-01-21 PROCEDURE — 6360000002 HC RX W HCPCS: Performed by: NURSE ANESTHETIST, CERTIFIED REGISTERED

## 2022-01-21 PROCEDURE — 2500000003 HC RX 250 WO HCPCS: Performed by: NURSE ANESTHETIST, CERTIFIED REGISTERED

## 2022-01-21 PROCEDURE — 7100000000 HC PACU RECOVERY - FIRST 15 MIN: Performed by: SURGERY

## 2022-01-21 PROCEDURE — 7100000011 HC PHASE II RECOVERY - ADDTL 15 MIN: Performed by: SURGERY

## 2022-01-21 PROCEDURE — 88304 TISSUE EXAM BY PATHOLOGIST: CPT

## 2022-01-21 PROCEDURE — A4217 STERILE WATER/SALINE, 500 ML: HCPCS | Performed by: SURGERY

## 2022-01-21 PROCEDURE — 7100000010 HC PHASE II RECOVERY - FIRST 15 MIN: Performed by: SURGERY

## 2022-01-21 PROCEDURE — 2709999900 HC NON-CHARGEABLE SUPPLY: Performed by: SURGERY

## 2022-01-21 RX ORDER — PROPOFOL 10 MG/ML
INJECTION, EMULSION INTRAVENOUS PRN
Status: DISCONTINUED | OUTPATIENT
Start: 2022-01-21 | End: 2022-01-21 | Stop reason: SDUPTHER

## 2022-01-21 RX ORDER — GLYCOPYRROLATE 0.2 MG/ML
INJECTION INTRAMUSCULAR; INTRAVENOUS PRN
Status: DISCONTINUED | OUTPATIENT
Start: 2022-01-21 | End: 2022-01-21 | Stop reason: SDUPTHER

## 2022-01-21 RX ORDER — SODIUM CHLORIDE 9 MG/ML
25 INJECTION, SOLUTION INTRAVENOUS PRN
Status: DISCONTINUED | OUTPATIENT
Start: 2022-01-21 | End: 2022-01-21 | Stop reason: HOSPADM

## 2022-01-21 RX ORDER — FENTANYL CITRATE 50 UG/ML
INJECTION, SOLUTION INTRAMUSCULAR; INTRAVENOUS PRN
Status: DISCONTINUED | OUTPATIENT
Start: 2022-01-21 | End: 2022-01-21 | Stop reason: SDUPTHER

## 2022-01-21 RX ORDER — LIDOCAINE HYDROCHLORIDE 20 MG/ML
INJECTION, SOLUTION EPIDURAL; INFILTRATION; INTRACAUDAL; PERINEURAL PRN
Status: DISCONTINUED | OUTPATIENT
Start: 2022-01-21 | End: 2022-01-21 | Stop reason: SDUPTHER

## 2022-01-21 RX ORDER — PROPOFOL 10 MG/ML
INJECTION, EMULSION INTRAVENOUS CONTINUOUS PRN
Status: DISCONTINUED | OUTPATIENT
Start: 2022-01-21 | End: 2022-01-21 | Stop reason: SDUPTHER

## 2022-01-21 RX ORDER — MIDAZOLAM HYDROCHLORIDE 1 MG/ML
INJECTION INTRAMUSCULAR; INTRAVENOUS PRN
Status: DISCONTINUED | OUTPATIENT
Start: 2022-01-21 | End: 2022-01-21 | Stop reason: SDUPTHER

## 2022-01-21 RX ORDER — FENTANYL CITRATE 50 UG/ML
50 INJECTION, SOLUTION INTRAMUSCULAR; INTRAVENOUS EVERY 5 MIN PRN
Status: DISCONTINUED | OUTPATIENT
Start: 2022-01-21 | End: 2022-01-21 | Stop reason: HOSPADM

## 2022-01-21 RX ORDER — PROMETHAZINE HYDROCHLORIDE 25 MG/ML
6.25 INJECTION, SOLUTION INTRAMUSCULAR; INTRAVENOUS
Status: DISCONTINUED | OUTPATIENT
Start: 2022-01-21 | End: 2022-01-21 | Stop reason: HOSPADM

## 2022-01-21 RX ORDER — SODIUM CHLORIDE 0.9 % (FLUSH) 0.9 %
5-40 SYRINGE (ML) INJECTION EVERY 12 HOURS SCHEDULED
Status: DISCONTINUED | OUTPATIENT
Start: 2022-01-21 | End: 2022-01-21 | Stop reason: HOSPADM

## 2022-01-21 RX ORDER — SODIUM CHLORIDE 9 MG/ML
INJECTION, SOLUTION INTRAVENOUS CONTINUOUS
Status: DISCONTINUED | OUTPATIENT
Start: 2022-01-21 | End: 2022-01-21 | Stop reason: HOSPADM

## 2022-01-21 RX ORDER — HYDROCODONE BITARTRATE AND ACETAMINOPHEN 5; 325 MG/1; MG/1
2 TABLET ORAL PRN
Status: DISCONTINUED | OUTPATIENT
Start: 2022-01-21 | End: 2022-01-21 | Stop reason: HOSPADM

## 2022-01-21 RX ORDER — HYDRALAZINE HYDROCHLORIDE 20 MG/ML
5 INJECTION INTRAMUSCULAR; INTRAVENOUS EVERY 10 MIN PRN
Status: DISCONTINUED | OUTPATIENT
Start: 2022-01-21 | End: 2022-01-21 | Stop reason: HOSPADM

## 2022-01-21 RX ORDER — SODIUM CHLORIDE 0.9 % (FLUSH) 0.9 %
5-40 SYRINGE (ML) INJECTION PRN
Status: DISCONTINUED | OUTPATIENT
Start: 2022-01-21 | End: 2022-01-21 | Stop reason: HOSPADM

## 2022-01-21 RX ORDER — OXYCODONE HYDROCHLORIDE 5 MG/1
5 TABLET ORAL EVERY 6 HOURS PRN
Qty: 12 TABLET | Refills: 0 | Status: SHIPPED | OUTPATIENT
Start: 2022-01-21 | End: 2022-01-24

## 2022-01-21 RX ORDER — HYDROCODONE BITARTRATE AND ACETAMINOPHEN 5; 325 MG/1; MG/1
1 TABLET ORAL PRN
Status: DISCONTINUED | OUTPATIENT
Start: 2022-01-21 | End: 2022-01-21 | Stop reason: HOSPADM

## 2022-01-21 RX ORDER — ONDANSETRON 2 MG/ML
4 INJECTION INTRAMUSCULAR; INTRAVENOUS
Status: DISCONTINUED | OUTPATIENT
Start: 2022-01-21 | End: 2022-01-21 | Stop reason: HOSPADM

## 2022-01-21 RX ORDER — MEPERIDINE HYDROCHLORIDE 25 MG/ML
12.5 INJECTION INTRAMUSCULAR; INTRAVENOUS; SUBCUTANEOUS
Status: DISCONTINUED | OUTPATIENT
Start: 2022-01-21 | End: 2022-01-21 | Stop reason: HOSPADM

## 2022-01-21 RX ORDER — MAGNESIUM HYDROXIDE 1200 MG/15ML
LIQUID ORAL CONTINUOUS PRN
Status: COMPLETED | OUTPATIENT
Start: 2022-01-21 | End: 2022-01-21

## 2022-01-21 RX ORDER — FENTANYL CITRATE 50 UG/ML
25 INJECTION, SOLUTION INTRAMUSCULAR; INTRAVENOUS EVERY 5 MIN PRN
Status: DISCONTINUED | OUTPATIENT
Start: 2022-01-21 | End: 2022-01-21 | Stop reason: HOSPADM

## 2022-01-21 RX ADMIN — MIDAZOLAM 2 MG: 1 INJECTION INTRAMUSCULAR; INTRAVENOUS at 08:57

## 2022-01-21 RX ADMIN — SODIUM CHLORIDE: 9 INJECTION, SOLUTION INTRAVENOUS at 08:44

## 2022-01-21 RX ADMIN — PROPOFOL 40 MG: 10 INJECTION, EMULSION INTRAVENOUS at 09:01

## 2022-01-21 RX ADMIN — LIDOCAINE HYDROCHLORIDE 100 MG: 20 INJECTION, SOLUTION EPIDURAL; INFILTRATION; INTRACAUDAL; PERINEURAL at 09:01

## 2022-01-21 RX ADMIN — FENTANYL CITRATE 25 MCG: 50 INJECTION INTRAMUSCULAR; INTRAVENOUS at 09:31

## 2022-01-21 RX ADMIN — FENTANYL CITRATE 25 MCG: 50 INJECTION INTRAMUSCULAR; INTRAVENOUS at 09:11

## 2022-01-21 RX ADMIN — GLYCOPYRROLATE 0.2 MG: 0.2 INJECTION, SOLUTION INTRAMUSCULAR; INTRAVENOUS at 08:57

## 2022-01-21 RX ADMIN — PROPOFOL 175 MCG/KG/MIN: 10 INJECTION, EMULSION INTRAVENOUS at 09:01

## 2022-01-21 RX ADMIN — Medication 3000 MG: at 09:04

## 2022-01-21 RX ADMIN — FENTANYL CITRATE 25 MCG: 50 INJECTION INTRAMUSCULAR; INTRAVENOUS at 09:33

## 2022-01-21 RX ADMIN — FENTANYL CITRATE 25 MCG: 50 INJECTION INTRAMUSCULAR; INTRAVENOUS at 09:04

## 2022-01-21 ASSESSMENT — PAIN SCALES - GENERAL
PAINLEVEL_OUTOF10: 0

## 2022-01-21 ASSESSMENT — PULMONARY FUNCTION TESTS
PIF_VALUE: 0
PIF_VALUE: 1
PIF_VALUE: 0

## 2022-01-21 ASSESSMENT — PAIN - FUNCTIONAL ASSESSMENT
PAIN_FUNCTIONAL_ASSESSMENT: ACTIVITIES ARE NOT PREVENTED
PAIN_FUNCTIONAL_ASSESSMENT: 0-10

## 2022-01-21 ASSESSMENT — PAIN DESCRIPTION - DESCRIPTORS: DESCRIPTORS: ACHING;CONSTANT

## 2022-01-21 NOTE — H&P
PATIENT NAME: Gogo Connolly   YOB: 1965    ADMISSION DATE: 1/21/2022  8:12 AM      TODAY'S DATE: 1/21/2022    CHIEF COMPLAINT:  lipoma      HISTORY OF PRESENT ILLNESS:  The patient is a 49 y.o. male  who presents with an enlarging lipoma on left upper chest. Noted years ago with steady increase in size. Some pain with direct pressure. For removal today. Past Medical History:        Diagnosis Date    Arthritis     rheumatoid    Asthma     Colon polyps 06/21/2018    COLONOSCOPY, DR CARTER GARCIA, CECUM POLYP TUBULAR ADENOMAS.  Gastritis 06/21/2018    EGD, DR CARTER GARCIA, MILD CHRONIC GASTRITIS.  GERD (gastroesophageal reflux disease)     Hyperlipidemia     Hyperplastic rectal polyp 06/21/2018    COLONOSCOPY, DR CARTER GARCIA, HYPERPLASTIC POLYP RECTAL.     Hypertension     Increased intraocular pressure     Migraine     Pneumonia     x 3    Sleep apnea     Testosterone deficiency 4/6/2017       Past Surgical History:        Procedure Laterality Date    CHOLECYSTECTOMY, LAPAROSCOPIC  8-11-11    COLONOSCOPY  06/21/2018    COLONOSCOPY, DR CARTER GARCIA, COLON POLYPS X2, RECTAL POLYP X1.    KNEE ARTHROSCOPY Bilateral     KNEE SURGERY Right 12/07/2020    meniscus repair    NASAL SEPTUM SURGERY      and palatoplasty       Medications Prior to Admission:   Medications Prior to Admission: Semaglutide-Weight Management (WEGOVY) 1 MG/0.5ML SOAJ SC injection, Inject 1 mg into the skin every 7 days  Semaglutide-Weight Management (WEGOVY) 1 MG/0.5ML SOAJ SC injection, Inject 1 mg into the skin every 7 days  cetirizine (ZYRTEC) 10 MG tablet, Take 1 tablet by mouth daily  montelukast (SINGULAIR) 10 MG tablet, TAKE 1 TABLET BY MOUTH DAILY  rosuvastatin (CRESTOR) 10 MG tablet, Take 1 tablet by mouth daily  amLODIPine (NORVASC) 10 MG tablet, TAKE 1 TABLET BY MOUTH DAILY  buPROPion (WELLBUTRIN XL) 150 MG extended release tablet, Take 1 tablet by mouth every morning  spironolactone (ALDACTONE) 50 MG tablet, Take 1 tablet by mouth daily  hydroxychloroquine (PLAQUENIL) 200 MG tablet, Take 200 mg by mouth 2 times daily  olmesartan (BENICAR) 40 MG tablet, Take 1 tablet by mouth daily  doxazosin (CARDURA) 8 MG tablet, TAKE 1 TABLET BY MOUTH EVERY NIGHT  leflunomide (ARAVA) 20 MG tablet, Take 20 mg by mouth daily  ondansetron (ZOFRAN ODT) 8 MG TBDP disintegrating tablet, Take 1 tablet by mouth every 8 hours as needed for Nausea  Cholecalciferol (VITAMIN D) 2000 units TABS tablet, Take 1 tablet by mouth daily After finishing 12 week couse  aspirin (ASPIRIN LOW DOSE) 81 MG tablet, Take 1 tablet by mouth daily  blood glucose test strips (FREESTYLE LITE) strip, 1 each by In Vitro route 2 times daily Indications: Impaired Glucose Tolerance Routinely due to elevated blood sugars    Allergies:  Dye  [barium-containing compounds], Iodine, and Lisinopril    Social History:   TOBACCO:   reports that he has never smoked. He has never used smokeless tobacco.  ETOH:   reports current alcohol use. DRUGS:   reports no history of drug use. Family History:       Problem Relation Age of Onset    High Blood Pressure Mother     Cancer Father         testicular, multiple myeloma    Heart Attack Father 47        CABG 4V    Other Father         MRSA    No Known Problems Brother     Early Death Maternal Uncle        REVIEW OF SYSTEMS:    CONSTITUTIONAL:  negative  HEENT:  negative  CARDIOVASCULAR:  negative  GASTROINTESTINAL:  negative  GENITOURINARY:  negative  HEMATOLOGIC/LYMPHATIC:  negative  ENDOCRINE:  negative  All other systems negative    PHYSICAL EXAM:    VITALS:  BP (!) 162/108   Pulse 87   Temp 96.9 °F (36.1 °C) (Temporal)   Resp 16   Ht 6' 1\" (1.854 m)   Wt 282 lb 10.1 oz (128.2 kg)   SpO2 97%   BMI 37.29 kg/m²   INTAKE/OUTPUT:   No intake/output data recorded. No intake/output data recorded.   CONSTITUTIONAL:  awake, alert, no apparent distress and normal weight  ENT:  normocepalic, without obvious abnormality  NECK:  supple, symmetrical, trachea midline   LUNGS:  clear to auscultation, no crackles or wheezing/ 5 cm lipoma Left upper chest  CARDIOVASCULAR:  regular rate and rhythm and no murmur noted  ABDOMEN: soft, non tender  MUSCULOSKELETAL:  0+ pitting edema lower extremities  NEUROLOGIC:  Mental Status Exam:  Level of Alertness:   awake  Orientation:   person, place, time      ASSESSMENT AND PLAN:    Lipoma chest wall  For removal today  The risks, benefits and alternatives to the planned procedure were discussed. Patient expressed an understanding and is willing to proceed.     Electronically signed by Zachary Holman MD on 1/21/2022 at 8:48 AM    Zachary Holman MD

## 2022-01-21 NOTE — PROGRESS NOTES
CLINICAL PHARMACY NOTE: MEDS TO BEDS    Total # of Prescriptions Filled: 1   The following medications were delivered to the patient:  Discharge Medication List as of 1/21/2022 10:13 AM      START taking these medications    Details   oxyCODONE (ROXICODONE) 5 MG immediate release tablet Take 1 tablet by mouth every 6 hours as needed for Pain for up to 3 days. Intended supply: 3 days.  Take lowest dose possible to manage pain, Disp-12 tablet, R-0Print         ·   ·     Additional Documentation:

## 2022-01-21 NOTE — ANESTHESIA POSTPROCEDURE EVALUATION
Department of Anesthesiology  Postprocedure Note    Patient: Dayana Grant  MRN: [id number]  YOB: 1965  Date of evaluation: 1/21/2022  Time:  11:10 AM     Procedure Summary     Date: 01/21/22 Room / Location: [address]    Anesthesia Start: 0744 Anesthesia Stop: 4121    Procedure: REMOVAL OF 4 CENTIMETER LIPOMA: LEFT UPPER CHEST (Left Chest) Diagnosis:       Lipoma of torso      (LIPOMA OF TORSO)    Surgeons: Tiburcio Fernando MD Responsible Provider:     Anesthesia Type: MAC ASA Status: 2          Anesthesia Type: MAC    Roseline Phase I: Roseline Score: 8    Roseline Phase II: Roseline Score: 10    Last vitals: Reviewed and per EMR flowsheets.        Anesthesia Post Evaluation    Patient location during evaluation: PACU  Patient participation: complete - patient participated  Level of consciousness: awake and alert  Pain score: 2  Airway patency: patent  Nausea & Vomiting: no nausea and no vomiting  Complications: no  Cardiovascular status: blood pressure returned to baseline  Respiratory status: acceptable  Hydration status: euvolemic

## 2022-01-21 NOTE — PROGRESS NOTES
From PACU. Alert and oriented. No c/o. Vss. Dressing is clean dry intact. Area without swelling or bruising.

## 2022-01-21 NOTE — ANESTHESIA PRE PROCEDURE
[facility] Department of Anesthesiology  Pre-Anesthesia Evaluation/Consultation       Name:  Sandra Cardenas  : 1965  Age:  49 y. o. MRN:  [id number]  Date: 2022           Surgeon: Surgeon(s):  Alirio Marina MD    Procedure: Procedure(s):  REMOVAL OF 4 CENTIMETER LIPOMA: LEFT UPPER CHEST     Allergies   Allergen Reactions    Dye  [Barium-Containing Compounds]      Other reaction(s): sneezing    Iodine Other (See Comments)     IVP dye reaction. Could not stop sneezing. Used a second time, and took benadryl prior and no issues.  Lisinopril Other (See Comments)     Cough       Patient Active Problem List   Diagnosis    Hypercholesteremia    Hypertension    Chronic rhinitis    Migraine    Diverticula of colon    Colon polyps    Vitamin D deficiency    MONISHA (obstructive sleep apnea)    Gilbert syndrome    Multiple renal cysts    Testosterone deficiency    Lipoma of anterior chest wall    Polymyalgia rheumatica (HCC)    Temporal arteritis (HCC)    Increased intraocular pressure    Polyarthritis    Elevated liver enzymes    Low testosterone    Anxiety    Insomnia     Past Medical History:   Diagnosis Date    Arthritis     rheumatoid    Asthma     Colon polyps 2018    COLONOSCOPY, DR CARTER GARCIA, CECUM POLYP TUBULAR ADENOMAS.  Gastritis 2018    EGD, DR CARTER GARCIA, MILD CHRONIC GASTRITIS.  GERD (gastroesophageal reflux disease)     Hyperlipidemia     Hyperplastic rectal polyp 2018    COLONOSCOPY, DR CARTER GARCIA, HYPERPLASTIC POLYP RECTAL.     Hypertension     Increased intraocular pressure     Migraine     Pneumonia     x 3    Sleep apnea     Testosterone deficiency 2017     Past Surgical History:   Procedure Laterality Date    CHOLECYSTECTOMY, LAPAROSCOPIC  11    COLONOSCOPY  2018    COLONOSCOPY, DR CARTER GARCIA, COLON POLYPS X2, RECTAL POLYP X1.    KNEE ARTHROSCOPY Bilateral     KNEE SURGERY Right 12/07/2020    meniscus repair    NASAL SEPTUM SURGERY      and palatoplasty     Social History     Tobacco Use    Smoking status: Never Smoker    Smokeless tobacco: Never Used   Vaping Use    Vaping Use: Never used   Substance Use Topics    Alcohol use: Yes     Comment: rare    Drug use: No     Medications  No current facility-administered medications on file prior to encounter.      Current Outpatient Medications on File Prior to Encounter   Medication Sig Dispense Refill    Semaglutide-Weight Management (WEGOVY) 1 MG/0.5ML SOAJ SC injection Inject 1 mg into the skin every 7 days      Semaglutide-Weight Management (WEGOVY) 1 MG/0.5ML SOAJ SC injection Inject 1 mg into the skin every 7 days 2 mL 0    cetirizine (ZYRTEC) 10 MG tablet Take 1 tablet by mouth daily 30 tablet 5    montelukast (SINGULAIR) 10 MG tablet TAKE 1 TABLET BY MOUTH DAILY 30 tablet 5    rosuvastatin (CRESTOR) 10 MG tablet Take 1 tablet by mouth daily 30 tablet 5    amLODIPine (NORVASC) 10 MG tablet TAKE 1 TABLET BY MOUTH DAILY 30 tablet 5    buPROPion (WELLBUTRIN XL) 150 MG extended release tablet Take 1 tablet by mouth every morning 30 tablet 5    spironolactone (ALDACTONE) 50 MG tablet Take 1 tablet by mouth daily 30 tablet 3    hydroxychloroquine (PLAQUENIL) 200 MG tablet Take 200 mg by mouth 2 times daily      olmesartan (BENICAR) 40 MG tablet Take 1 tablet by mouth daily 90 tablet 1    doxazosin (CARDURA) 8 MG tablet TAKE 1 TABLET BY MOUTH EVERY NIGHT 90 tablet 1    leflunomide (ARAVA) 20 MG tablet Take 20 mg by mouth daily      ondansetron (ZOFRAN ODT) 8 MG TBDP disintegrating tablet Take 1 tablet by mouth every 8 hours as needed for Nausea 20 tablet 2    Cholecalciferol (VITAMIN D) 2000 units TABS tablet Take 1 tablet by mouth daily After finishing 12 week couse 30 tablet 11    aspirin (ASPIRIN LOW DOSE) 81 MG tablet Take 1 tablet by mouth daily 365 tablet 0    blood glucose test strips (FREESTYLE LITE) strip 1 each by In Vitro route 2 times daily Indications: Impaired Glucose Tolerance Routinely due to elevated blood sugars 100 each 3     Current Facility-Administered Medications   Medication Dose Route Frequency Provider Last Rate Last Admin    0.9 % sodium chloride infusion   IntraVENous Continuous Case Dunbar MD        sodium chloride flush 0.9 % injection 5-40 mL  5-40 mL IntraVENous 2 times per day Case Dunbar MD        sodium chloride flush 0.9 % injection 5-40 mL  5-40 mL IntraVENous PRN Case Dunbar MD        0.9 % sodium chloride infusion  25 mL IntraVENous PRN Case Dunbar MD        ceFAZolin (ANCEF) 3000 mg in dextrose 5 % 100 mL IVPB  3,000 mg IntraVENous Once Ted Trevino MD         Vital Signs (Current)   Vitals:    01/14/22 1417   Weight: 280 lb (127 kg)   Height: 6' 1\" (1.854 m)                                            Vital Signs Statistics (for past 48 hrs)     No data recorded  BP Readings from Last 3 Encounters:   12/16/21 138/86   08/17/21 130/74   06/16/21 124/82       BMI  Body mass index is 36.94 kg/m². Estimated body mass index is 36.94 kg/m² as calculated from the following:    Height as of this encounter: 6' 1\" (1.854 m). Weight as of this encounter: 280 lb (127 kg).     CBC   Lab Results   Component Value Date    WBC 5.1 03/19/2021    RBC 4.40 03/19/2021    HGB 13.8 03/19/2021    HCT 40.5 03/19/2021    MCV 92.1 03/19/2021    RDW 14.6 03/19/2021     03/19/2021     CMP    Lab Results   Component Value Date     12/09/2021    K 3.9 12/09/2021     12/09/2021    CO2 25 12/09/2021    BUN 18 12/09/2021    CREATININE 1.3 12/09/2021    CREATININE 1.4 12/17/2019    GFRAA >60 12/09/2021    GFRAA >60 08/18/2011    AGRATIO 2.7 12/09/2021    LABGLOM 58 12/09/2021    GLUCOSE 107 12/09/2021    PROT 5.9 12/09/2021    PROT 7.0 12/17/2019    CALCIUM 9.4 12/09/2021    BILITOT 0.7 12/09/2021 ALKPHOS 70 12/09/2021    AST 17 12/09/2021    ALT 22 12/09/2021     BMP    Lab Results   Component Value Date     12/09/2021    K 3.9 12/09/2021     12/09/2021    CO2 25 12/09/2021    BUN 18 12/09/2021    CREATININE 1.3 12/09/2021    CREATININE 1.4 12/17/2019    CALCIUM 9.4 12/09/2021    GFRAA >60 12/09/2021    GFRAA >60 08/18/2011    LABGLOM 58 12/09/2021    GLUCOSE 107 12/09/2021     POCGlucose  No results for input(s): GLUCOSE in the last 72 hours. Coags    Lab Results   Component Value Date    PROTIME 11.2 08/18/2011    INR 1.03 84/12/3551     HCG (If Applicable) No results found for: PREGTESTUR, PREGSERUM, HCG, HCGQUANT   ABGs No results found for: PHART, PO2ART, HYS6NZP, RVP0GSW, BEART, N2ZSNMGO   Type & Screen (If Applicable)  No results found for: LABABO, LABRH                         BMI: Wt Readings from Last 3 Encounters:       NPO Status:  >8h                    Anesthesia Evaluation  Patient summary reviewed no history of anesthetic complications:   Airway: Mallampati: II  TM distance: >3 FB   Neck ROM: full  Mouth opening: > = 3 FB Dental: normal exam     Comment: No loose teeth    Pulmonary: breath sounds clear to auscultation  (+) sleep apnea:  asthma:                            Cardiovascular:    (+) hypertension:,     (-) past MI, CABG/stent and no hyperlipidemia        Rate: normal                    Neuro/Psych:   (+) headaches: migraine headaches,             GI/Hepatic/Renal:   (+) GERD:,      (-) liver disease, no renal disease and no morbid obesity      ROS comment: Gilbert Syndrome. Endo/Other:    (+) : arthritis (polymyalgia rheumatica): rheumatoid. , .    (-) diabetes mellitus, hypothyroidism               Abdominal:             Vascular:     - DVT and PE. Other Findings:             Anesthesia Plan      MAC     ASA 2       Induction: intravenous. MIPS: Postoperative opioids intended and Prophylactic antiemetics administered.   Anesthetic plan and risks discussed with patient. Plan discussed with CRNA. This pre-anesthesia assessment may be used as a history and physical.    DOS STAFF ADDENDUM:    Pt seen and examined, chart reviewed (including anesthesia, drug and allergy history). No interval changes to history and physical examination. Anesthetic plan, risks, benefits, alternatives, and personnel involved discussed with patient. Patient verbalized an understanding and agrees to proceed.       Terry Felix MD  January 21, 2022  8:18 AM

## 2022-01-21 NOTE — BRIEF OP NOTE
Brief Postoperative Note      Patient: Marlo Riggs  YOB: 1965  MRN: [id number]    Date of Procedure: 1/21/2022    Pre-Op Diagnosis: LIPOMA OF TORSO    Post-Op Diagnosis: Same       Procedure(s):  REMOVAL OF 4 CENTIMETER LIPOMA: LEFT UPPER CHEST, subcutaneous    Surgeon(s):  Audrey Yost MD    Assistant:  Surgical Assistant: Marta Baird    Anesthesia: Monitor Anesthesia Care    Estimated Blood Loss (mL): less than 50     Complications: None    Specimens:   ID Type Source Tests Collected by Time Destination   A : A) Left chest Lipoma Viral Lesion SURGICAL PATHOLOGY Audrey Yost MD 1/21/2022 7204        Implants:  * No implants in log *      Drains: * No LDAs found *    Findings: no complications    Electronically signed by Kobi Reyes MD on 1/21/2022 at 9:32 AM

## 2022-01-21 NOTE — OP NOTE
[address]                                OPERATIVE REPORT    PATIENT NAME: Madhavi Damon                 :        1965  MED REC NO:   [id number]                          ROOM:  ACCOUNT NO:   [de-identified]                           ADMIT DATE: 2022  PROVIDER:     Boone Hui MD    DATE OF PROCEDURE:  2022    PREOPERATIVE DIAGNOSIS:  Lipoma, left upper chest wall. POSTOPERATIVE DIAGNOSIS:  Lipoma, left upper chest wall. OPERATION PERFORMED:  Removal of subcutaneous lipoma, left upper chest  wall, measuring 4 cm. SURGEON:  Boone Hui MD    SPECIMEN:  Lipoma. ESTIMATED BLOOD LOSS:  Less than 50 mL. COMPLICATIONS:  None. DISPOSITION:  To Recovery in stable condition. INDICATIONS:  The patient is a 82-year-old male with an enlarging mass  in the left upper chest near the clavicle. On exam, this was consistent  with a lipoma and due to increasing size and now some discomfort,  removal was recommended. The risks, benefits, and alternatives were  reviewed and he agreed to proceed. OPERATIVE PROCEDURE:  The patient was brought to the operating room,  placed supine, anesthesia delivered, and the left upper chest prepped  and draped in a sterile fashion. Local anesthetic was infused and a  transverse incision made over a palpable mass. Dissection was carried  into the subcutaneous tissue where a multilobulated lipoma was  encountered. This was dissected free from the surrounding subcutaneous  tissue and removed. The lipoma was contained within the subcutaneous  layer and measured 4 cm. Hemostasis was achieved and the wound closed  with 3-0 and 4-0 Vicryl. Dressings were applied and the patient  transferred to Recovery in good condition.         Timothy Hull MD    D: 2022 9:58:11       T: 2022 10:01:22     [email or web address]  Job#: [id number]     Doc#: [id number]    CC:

## 2022-01-21 NOTE — PROGRESS NOTES
Vss. Dressing is clean dry intact. Tolerated sitting up and po fluids and crackers well. Family at bedside. Verbalized understanding of discharge instructions.

## 2022-01-27 ENCOUNTER — TELEPHONE (OUTPATIENT)
Dept: SURGERY | Age: 57
End: 2022-01-27

## 2022-01-31 ENCOUNTER — PATIENT MESSAGE (OUTPATIENT)
Dept: FAMILY MEDICINE CLINIC | Age: 57
End: 2022-01-31

## 2022-01-31 NOTE — TELEPHONE ENCOUNTER
From: Leslie Rivera  To: Dr. Osman Mage: 1/31/2022 3:11 PM EST  Subject: Lynette Rowe, I have been fine so far with the [facility]. Some bloating but that is generally resolved by adjusted portions. My 4th and final shot will be used this coming , Feb 3rd. Will there be a step up on the next prescription or a continuation? On my home scale I am down 7 lbs.

## 2022-02-07 ENCOUNTER — OFFICE VISIT (OUTPATIENT)
Dept: SURGERY | Age: 57
End: 2022-02-07

## 2022-02-07 DIAGNOSIS — D17.1 LIPOMA OF TORSO: Primary | ICD-10-CM

## 2022-02-07 PROCEDURE — 99024 POSTOP FOLLOW-UP VISIT: CPT | Performed by: SURGERY

## 2022-02-07 NOTE — PROGRESS NOTES
Rachid Norman (:  1965) is a 49 y.o. male,Established patient, here for evaluation of the following chief complaint(s):  Post-Op Check (pt is here today for a postop lipoma removal. )         ASSESSMENT/PLAN:  1. Lipoma of torso    Follow up with me as needed           Subjective   SUBJECTIVE/OBJECTIVE:  HPI  Patient presents s/p removal of lipoma. Patient is two weeks post op. Pain level is insignificant. Incision appearance: well healed. Post op complications: none. Pathology report reviewed with patient and showed lipoma. Follow up prn. Review of Systems       Objective   Physical Exam       Electronically signed by Alex Delgado MD on 2022 at 1:26 PM        An electronic signature was used to authenticate this note.     --Alex Delgado MD

## 2022-02-08 RX ORDER — SEMAGLUTIDE 1 MG/.5ML
INJECTION, SOLUTION SUBCUTANEOUS
Qty: 2 ML | Refills: 0 | Status: SHIPPED | OUTPATIENT
Start: 2022-02-08 | End: 2022-02-09

## 2022-02-08 NOTE — TELEPHONE ENCOUNTER
LV 12/16/21 WITH DR DUONG FOR HTN NV 3/16/22   Ref Range & Units 12/9/21 1403 8/2/21 1657 8/2/19 1517 8/2/18 1545 7/7/15 1621   Hemoglobin A1C See comment % 5.9  5.9 R  5.2 R  6.0 R  5.4 CM

## 2022-02-09 ENCOUNTER — TELEPHONE (OUTPATIENT)
Dept: FAMILY MEDICINE CLINIC | Age: 57
End: 2022-02-09

## 2022-02-09 NOTE — TELEPHONE ENCOUNTER
Pt wife is calling about pt Wegovy. Pt wife said it should be 1.7 mg   And next month will be 2.4 mg       Can we call in the medication again.     [address] [facility]

## 2022-03-16 ENCOUNTER — OFFICE VISIT (OUTPATIENT)
Dept: FAMILY MEDICINE CLINIC | Age: 57
End: 2022-03-16
Payer: COMMERCIAL

## 2022-03-16 VITALS
DIASTOLIC BLOOD PRESSURE: 78 MMHG | TEMPERATURE: 97.1 F | SYSTOLIC BLOOD PRESSURE: 128 MMHG | BODY MASS INDEX: 37.11 KG/M2 | WEIGHT: 280 LBS | HEIGHT: 73 IN

## 2022-03-16 DIAGNOSIS — E78.00 HYPERCHOLESTEREMIA: ICD-10-CM

## 2022-03-16 DIAGNOSIS — E66.09 CLASS 2 OBESITY DUE TO EXCESS CALORIES WITH BODY MASS INDEX (BMI) OF 36.0 TO 36.9 IN ADULT, UNSPECIFIED WHETHER SERIOUS COMORBIDITY PRESENT: ICD-10-CM

## 2022-03-16 DIAGNOSIS — I10 PRIMARY HYPERTENSION: Primary | ICD-10-CM

## 2022-03-16 DIAGNOSIS — M06.9 RHEUMATOID ARTHRITIS INVOLVING MULTIPLE SITES, UNSPECIFIED WHETHER RHEUMATOID FACTOR PRESENT (HCC): ICD-10-CM

## 2022-03-16 DIAGNOSIS — R11.0 NAUSEA: ICD-10-CM

## 2022-03-16 DIAGNOSIS — F41.9 ANXIETY: ICD-10-CM

## 2022-03-16 DIAGNOSIS — H40.059 RAISED INTRAOCULAR PRESSURE, UNSPECIFIED LATERALITY: ICD-10-CM

## 2022-03-16 PROCEDURE — 99214 OFFICE O/P EST MOD 30 MIN: CPT | Performed by: FAMILY MEDICINE

## 2022-03-16 RX ORDER — CITALOPRAM 20 MG/1
20 TABLET ORAL DAILY
Qty: 30 TABLET | Refills: 3 | Status: SHIPPED | OUTPATIENT
Start: 2022-03-16 | End: 2022-08-07 | Stop reason: SDUPTHER

## 2022-03-16 RX ORDER — ONDANSETRON 8 MG/1
8 TABLET, ORALLY DISINTEGRATING ORAL EVERY 8 HOURS PRN
Qty: 20 TABLET | Refills: 2 | Status: SHIPPED | OUTPATIENT
Start: 2022-03-16

## 2022-03-16 NOTE — PROGRESS NOTES
[facility]  Clinic Note    Date: 3/16/2022                                               Subjective:     Chief Complaint   Patient presents with    Other     3 MO FOLLOW UP TO POTASSIUM     Hypertension     3 MO HTN ROUTINE FOLLWO UP      HPI    LAST BP READING AT HOME 123/90  Usually running 140's  Stopped aldactone as urine frequency. BP Readings from Last 3 Encounters:   01/21/22 (!) 127/59   01/21/22 (!) 160/84   12/16/21 138/86     Pulse Readings from Last 3 Encounters:   01/21/22 74   12/16/21 75   08/17/21 78     Wt Readings from Last 3 Encounters:   03/16/22 280 lb (127 kg)   01/21/22 282 lb 10.1 oz (128.2 kg)   12/16/21 270 lb (122.5 kg)     Labs reviewd 12/9 - lipid good - cmp sl low total protein/ normal albumin - a1c 5.9%  Seeing rheum regularly for RA  On humira for 1 month - no change noted yet  High pressure in eyes/ eye pain w/ last med  Se[facility] by eye doctor at [facility] - ? Related to RA  Bursitis in hip - steroid lasted 10 days - steroids in knees helped months  Referred to PT but holding for now  Seems to have pain in groin/ down anterior thigh - not just bursa  Stopped wellbutrin for awhile - felt emotional more easily - exaggerated emotions - more tearful - better since stopping  Still some anxiety  Uses zofran prn nausea - ? Related to wegovy/ humira - some days bad  Needs refill. On wegovy 2.4 since last week - uses Thursday.   Definitely helping appetite at this dose - some bloating after eating  Nasal congestion off and on - getting some worse  Effects cpap use for karan  Watching diet  Seemed to lose weight w/ RA - limiting activity       Patient Active Problem List    Diagnosis Date Noted    Insomnia 08/17/2021    Anxiety 03/19/2021    Polyarthritis 11/01/2019    Elevated liver enzymes 11/01/2019    Low testosterone 11/01/2019    Increased intraocular pressure     Polymyalgia rheumatica ([address] 08/03/2018    Temporal arteritis ([address] 08/03/2018    Lipoma of anterior chest wall 06/28/2018    Testosterone deficiency 04/06/2017    Gilbert syndrome 12/27/2016    Multiple renal cysts 12/27/2016    MONISHA (obstructive sleep apnea)     Vitamin D deficiency 06/03/2014    Diverticula of colon 03/28/2014    Colon polyps 03/28/2014    Hypercholesteremia 01/27/2014    Hypertension 01/27/2014    Chronic rhinitis 01/27/2014    Migraine 01/27/2014     Past Medical History:   Diagnosis Date    Arthritis     rheumatoid    Asthma     Colon polyps 06/21/2018    COLONOSCOPY, DR CARTER GARCIA, CECUM POLYP TUBULAR ADENOMAS.  Gastritis 06/21/2018    EGD, DR CARTER GARCIA, MILD CHRONIC GASTRITIS.  GERD (gastroesophageal reflux disease)     Hyperlipidemia     Hyperplastic rectal polyp 06/21/2018    COLONOSCOPY, DR CARTER GARCIA, HYPERPLASTIC POLYP RECTAL.  Hypertension     Increased intraocular pressure     Migraine     Pneumonia     x 3    Sleep apnea     Testosterone deficiency 4/6/2017     Past Surgical History:   Procedure Laterality Date    CHEST WALL RESECTION Left 1/21/2022    REMOVAL OF 4 CENTIMETER LIPOMA: LEFT UPPER CHEST performed by Elvira Merlos MD at [facility], LAPAROSCOPIC  8-11-11    COLONOSCOPY  06/21/2018    COLONOSCOPY, DR CARTER GARCIA, COLON POLYPS X2, RECTAL POLYP X1.    KNEE ARTHROSCOPY Bilateral     KNEE SURGERY Right 12/07/2020    meniscus repair    NASAL SEPTUM SURGERY      and palatoplasty     Orders Only on 01/17/2022   Component Date Value Ref Range Status    SARS-CoV-2 01/17/2022 Not Detected  Not detected Final    Comment: This test has been authorized by FDA under an  Emergency Use Authorization (EUA).   This test is only authorized for the duration of the  time of declaration that circumstances exist justifying the  authorization of the emergency use of in vitro diagnostic  testing for detection of the SARS-CoV-2 virus  and/or diagnosis of COVID-19 infection under  section 564 (b)(1) of the Act, 21 U. S.C. 657PTC-4 (b) (1),  unless the authorization is terminated or revoked sooner.     Patient Fact [email or web address]  Provider Fact [email or web address]    METHODOLOGY: RT PCR       Family History   Problem Relation Age of Onset    High Blood Pressure Mother     Cancer Father         testicular, multiple myeloma    Heart Attack Father 47        CABG 4V    Other Father         MRSA    No Known Problems Brother     Early Death Maternal Uncle      Current Outpatient Medications   Medication Sig Dispense Refill    Semaglutide-Weight Management (WEGOVY) 1.7 MG/0.75ML SOAJ SC injection Inject 1.7 mg into the skin every 7 days 3 mL 0    Semaglutide-Weight Management (WEGOVY) 2.4 MG/0.75ML SOAJ SC injection Inject 2.4 mg into the skin every 7 days 3 mL 1    Semaglutide-Weight Management (WEGOVY) 1 MG/0.5ML SOAJ SC injection Inject 1 mg into the skin every 7 days      cetirizine (ZYRTEC) 10 MG tablet Take 1 tablet by mouth daily 30 tablet 5    montelukast (SINGULAIR) 10 MG tablet TAKE 1 TABLET BY MOUTH DAILY 30 tablet 5    rosuvastatin (CRESTOR) 10 MG tablet Take 1 tablet by mouth daily 30 tablet 5    amLODIPine (NORVASC) 10 MG tablet TAKE 1 TABLET BY MOUTH DAILY 30 tablet 5    buPROPion (WELLBUTRIN XL) 150 MG extended release tablet Take 1 tablet by mouth every morning 30 tablet 5    spironolactone (ALDACTONE) 50 MG tablet Take 1 tablet by mouth daily 30 tablet 3    hydroxychloroquine (PLAQUENIL) 200 MG tablet Take 200 mg by mouth 2 times daily      blood glucose test strips (FREESTYLE LITE) strip 1 each by In Vitro route 2 times daily Indications: Impaired Glucose Tolerance Routinely due to elevated blood sugars 100 each 3    olmesartan (BENICAR) 40 MG tablet Take 1 tablet by mouth daily 90 tablet 1    doxazosin (CARDURA) 8 MG tablet TAKE 1 TABLET BY MOUTH EVERY NIGHT 90 tablet 1    leflunomide (ARAVA) 20 MG tablet Take 20 mg by mouth daily      ondansetron (ZOFRAN ODT) 8 MG TBDP disintegrating tablet Take 1 tablet by mouth every 8 hours as needed for Nausea 20 tablet 2    Cholecalciferol (VITAMIN D) 2000 units TABS tablet Take 1 tablet by mouth daily After finishing 12 week couse 30 tablet 11    aspirin (ASPIRIN LOW DOSE) 81 MG tablet Take 1 tablet by mouth daily 365 tablet 0     No current facility-administered medications for this visit. Allergies   Allergen Reactions    Dye  [Barium-Containing Compounds]      Other reaction(s): sneezing    Iodine Other (See Comments)     IVP dye reaction. Could not stop sneezing. Used a second time, and took benadryl prior and no issues.  Lisinopril Other (See Comments)     Cough         Review of Systems    Objective:  Ht 6' 1\" (1.854 m)   Wt 280 lb (127 kg)   BMI 36.94 kg/m²     BP Readings from Last 3 Encounters:   01/21/22 (!) 127/59   01/21/22 (!) 160/84   12/16/21 138/86       Pulse Readings from Last 3 Encounters:   01/21/22 74   12/16/21 75   08/17/21 78       Wt Readings from Last 3 Encounters:   03/16/22 280 lb (127 kg)   01/21/22 282 lb 10.1 oz (128.2 kg)   12/16/21 270 lb (122.5 kg)       Physical Exam  Constitutional:       General: He is not in acute distress. Appearance: He is well-developed. Eyes:      General: No scleral icterus. Conjunctiva/sclera: Conjunctivae normal.   Cardiovascular:      Rate and Rhythm: Normal rate and regular rhythm. Heart sounds: Normal heart sounds. No murmur heard. No gallop. Pulmonary:      Effort: Pulmonary effort is normal. No respiratory distress. Breath sounds: Normal breath sounds. No wheezing, rhonchi or rales. Abdominal:      General: Bowel sounds are normal. There is no distension. Palpations: Abdomen is soft. Tenderness: There is no abdominal tenderness. Skin:     Findings: No erythema or rash. Neurological:      Mental Status: He is alert.          Assessment/Plan:      Diagnosis Orders 1. Primary hypertension     2. Hypercholesteremia     3. Rheumatoid arthritis involving multiple sites, unspecified whether rheumatoid factor present ([facility]     4. Nausea     5. Class 2 obesity due to excess calories with body mass index (BMI) of 36.0 to 36.9 in adult, unspecified whether serious comorbidity present     6. Raised intraocular pressure, unspecified laterality     7.  Anxiety     off wellbutrin - start celexa 10mg daily and bump to 20 as tolerated - se d/w pt for anxiety   cpap for karan  Reviewed recent labs - generally stable  humira per RA  Cont singulair/ zyrtec for rhinitis w/ nasal steroid  Cont statin for lipids - numbers good  bp stable on norvasc, doxazosin, benicar - off aldactone  Cont wegovy weekly - doing well - starting to lose weight  Diet/ activity dw/ pt as tolerated w/ RA  F/u 3 months/ prn  Colin Castillo MD, MD  3/16/2022  8:48 AM

## 2022-03-28 DIAGNOSIS — I10 ESSENTIAL HYPERTENSION: ICD-10-CM

## 2022-03-28 RX ORDER — OLMESARTAN MEDOXOMIL 40 MG/1
40 TABLET ORAL DAILY
Qty: 90 TABLET | Refills: 1 | Status: SHIPPED | OUTPATIENT
Start: 2022-03-28 | End: 2022-09-14

## 2022-04-04 ENCOUNTER — PATIENT MESSAGE (OUTPATIENT)
Dept: FAMILY MEDICINE CLINIC | Age: 57
End: 2022-04-04

## 2022-04-05 ENCOUNTER — E-VISIT (OUTPATIENT)
Dept: FAMILY MEDICINE CLINIC | Age: 57
End: 2022-04-05
Payer: COMMERCIAL

## 2022-04-05 DIAGNOSIS — H92.09 OTALGIA, UNSPECIFIED LATERALITY: Primary | ICD-10-CM

## 2022-04-05 PROCEDURE — 99421 OL DIG E/M SVC 5-10 MIN: CPT | Performed by: FAMILY MEDICINE

## 2022-04-05 RX ORDER — AMOXICILLIN 875 MG/1
875 TABLET, COATED ORAL 2 TIMES DAILY
Qty: 10 TABLET | Refills: 0 | Status: SHIPPED | OUTPATIENT
Start: 2022-04-05 | End: 2022-04-10

## 2022-04-05 ASSESSMENT — LIFESTYLE VARIABLES: SMOKING_STATUS: NO, I'VE NEVER SMOKED

## 2022-04-05 NOTE — TELEPHONE ENCOUNTER
From: Ashley Seek  To: Dr. Marley Shone: 4/4/2022 7:37 PM EDT  Subject: Illness    Sir I believe that I have another sinus infection based on my pain in throat and left ear when swallowing. If you can't call something in I can try and come in early in the next day or two or urgent care. Just checking with you first. Believe I've had antibiotics or dose pack previously.  Thank you

## 2022-04-12 DIAGNOSIS — G47.00 INSOMNIA, UNSPECIFIED TYPE: ICD-10-CM

## 2022-04-13 RX ORDER — SUVOREXANT 20 MG/1
TABLET, FILM COATED ORAL
Qty: 30 TABLET | Refills: 2 | Status: SHIPPED | OUTPATIENT
Start: 2022-04-13 | End: 2022-07-25

## 2022-05-03 RX ORDER — SEMAGLUTIDE 2.4 MG/.75ML
INJECTION, SOLUTION SUBCUTANEOUS
Qty: 3 ML | Refills: 1 | Status: SHIPPED | OUTPATIENT
Start: 2022-05-03 | End: 2022-06-30 | Stop reason: SDUPTHER

## 2022-06-16 ENCOUNTER — OFFICE VISIT (OUTPATIENT)
Dept: FAMILY MEDICINE CLINIC | Age: 57
End: 2022-06-16
Payer: COMMERCIAL

## 2022-06-16 VITALS
HEART RATE: 82 BPM | WEIGHT: 276 LBS | SYSTOLIC BLOOD PRESSURE: 130 MMHG | OXYGEN SATURATION: 96 % | HEIGHT: 73 IN | BODY MASS INDEX: 36.58 KG/M2 | DIASTOLIC BLOOD PRESSURE: 82 MMHG

## 2022-06-16 DIAGNOSIS — H40.059 RAISED INTRAOCULAR PRESSURE, UNSPECIFIED LATERALITY: ICD-10-CM

## 2022-06-16 DIAGNOSIS — I10 ESSENTIAL HYPERTENSION: Primary | ICD-10-CM

## 2022-06-16 DIAGNOSIS — M31.6 TEMPORAL ARTERITIS (HCC): ICD-10-CM

## 2022-06-16 DIAGNOSIS — M35.3 PMR (POLYMYALGIA RHEUMATICA) (HCC): ICD-10-CM

## 2022-06-16 DIAGNOSIS — J31.0 CHRONIC RHINITIS: ICD-10-CM

## 2022-06-16 DIAGNOSIS — E78.00 HYPERCHOLESTEREMIA: ICD-10-CM

## 2022-06-16 DIAGNOSIS — F41.9 ANXIETY: ICD-10-CM

## 2022-06-16 DIAGNOSIS — G47.33 OSA (OBSTRUCTIVE SLEEP APNEA): ICD-10-CM

## 2022-06-16 DIAGNOSIS — M06.9 RHEUMATOID ARTHRITIS INVOLVING MULTIPLE SITES, UNSPECIFIED WHETHER RHEUMATOID FACTOR PRESENT (HCC): ICD-10-CM

## 2022-06-16 PROBLEM — M13.0 POLYARTHRITIS: Status: RESOLVED | Noted: 2019-11-01 | Resolved: 2022-06-16

## 2022-06-16 PROCEDURE — 99214 OFFICE O/P EST MOD 30 MIN: CPT | Performed by: FAMILY MEDICINE

## 2022-06-16 ASSESSMENT — PATIENT HEALTH QUESTIONNAIRE - PHQ9
SUM OF ALL RESPONSES TO PHQ9 QUESTIONS 1 & 2: 0
SUM OF ALL RESPONSES TO PHQ QUESTIONS 1-9: 0
SUM OF ALL RESPONSES TO PHQ QUESTIONS 1-9: 0
2. FEELING DOWN, DEPRESSED OR HOPELESS: 0
SUM OF ALL RESPONSES TO PHQ QUESTIONS 1-9: 0
SUM OF ALL RESPONSES TO PHQ QUESTIONS 1-9: 0
1. LITTLE INTEREST OR PLEASURE IN DOING THINGS: 0

## 2022-06-16 NOTE — PROGRESS NOTES
[facility]  Clinic Note    Date: 6/16/2022                                               Subjective:     Chief Complaint   Patient presents with    Hypertension     3 MO HTN ROUTINE FOLLOW UP      HPI    Seeing rheum routinely for RA  Labs 12/9/21 looked good w/ a1c 5.9%  Seeing eye doctor routinely for elevated IOP  Had bursitis in hip/ thigh pain  occ nausea - 2/2 wegovy? - seems better  wellbutrin helping mood mood but switched to celexa last visit for anxiety - SEEMS TO BE HELPING  bp stable on norvasc, cardura, benicar - off aldactone  On cpap for karan  Weight keeps coming down - was lower but less active w/ back  Low back pain - had mri recently - bursitis - cortisone didn't help at all - mri showed nerve impingement - looking at doing KASSI  In PT for 2 weeks but not helping  Both thighs - numb and tingling - effects gait  Pain low back to hips and down both legs now  Eating twice a day - eating smaller portions  Slower progress than desired but steady progress in weight reduction none the less on max dose wegovy  bp usually 130's - couple high days  Up to 160/110 once but had not slept and stress. Humira, arava, plaquenil combo workign well  Using nasal steroid regularly for congestion - helps but not completely  Some neuropathy in feet - ?  Related to back - using aleve prn  BP Readings from Last 3 Encounters:   06/16/22 130/82   03/16/22 128/78   01/21/22 (!) 127/59     Pulse Readings from Last 3 Encounters:   06/16/22 82   01/21/22 74   12/16/21 75     Wt Readings from Last 3 Encounters:   06/16/22 276 lb (125.2 kg)   03/16/22 280 lb (127 kg)   01/21/22 282 lb 10.1 oz (128.2 kg)            Patient Active Problem List    Diagnosis Date Noted    Insomnia 08/17/2021    Anxiety 03/19/2021    Polyarthritis 11/01/2019    Elevated liver enzymes 11/01/2019    Low testosterone 11/01/2019    Increased intraocular pressure     Polymyalgia rheumatica ([address] 08/03/2018    Temporal arteritis ([address] 08/03/2018    Lipoma of anterior chest wall 06/28/2018    Testosterone deficiency 04/06/2017    Gilbert syndrome 12/27/2016    Multiple renal cysts 12/27/2016    MONISHA (obstructive sleep apnea)     Vitamin D deficiency 06/03/2014    Diverticula of colon 03/28/2014    Colon polyps 03/28/2014    Hypercholesteremia 01/27/2014    Hypertension 01/27/2014    Chronic rhinitis 01/27/2014    Migraine 01/27/2014     Past Medical History:   Diagnosis Date    Arthritis     rheumatoid    Asthma     Colon polyps 06/21/2018    COLONOSCOPY, DR CARTER GARCIA, CECUM POLYP TUBULAR ADENOMAS.  Gastritis 06/21/2018    EGD, DR CARTER GARCIA, MILD CHRONIC GASTRITIS.  GERD (gastroesophageal reflux disease)     Hyperlipidemia     Hyperplastic rectal polyp 06/21/2018    COLONOSCOPY, DR CARTER GARCIA, HYPERPLASTIC POLYP RECTAL.  Hypertension     Increased intraocular pressure     Migraine     Pneumonia     x 3    Sleep apnea     Testosterone deficiency 4/6/2017     Past Surgical History:   Procedure Laterality Date    CHEST WALL RESECTION Left 1/21/2022    REMOVAL OF 4 CENTIMETER LIPOMA: LEFT UPPER CHEST performed by Tamar Mack MD at [facility], LAPAROSCOPIC  8-11-11    COLONOSCOPY  06/21/2018    COLONOSCOPY, DR CARTER GARCIA, COLON POLYPS X2, RECTAL POLYP X1.    KNEE ARTHROSCOPY Bilateral     KNEE SURGERY Right 12/07/2020    meniscus repair    NASAL SEPTUM SURGERY      and palatoplasty     Orders Only on 01/17/2022   Component Date Value Ref Range Status    SARS-CoV-2 01/17/2022 Not Detected  Not detected Final    Comment: This test has been authorized by FDA under an  Emergency Use Authorization (EUA).   This test is only authorized for the duration of the  time of declaration that circumstances exist justifying the  authorization of the emergency use of in vitro diagnostic  testing for detection of the SARS-CoV-2 virus  and/or diagnosis of COVID-19 infection under  section 564 (b)(1) of the Act, 21 U. S.C. 354KOU-4 (b) (1),  unless the authorization is terminated or revoked sooner.     Patient Fact [email or web address]  Provider Fact [employer].    METHODOLOGY: RT PCR       Family History   Problem Relation Age of Onset    High Blood Pressure Mother     Cancer Father         testicular, multiple myeloma    Heart Attack Father 47        CABG 4V    Other Father         MRSA    No Known Problems Brother     Early Death Maternal Uncle      Current Outpatient Medications   Medication Sig Dispense Refill    WEGOVY 2.4 MG/0.75ML SOAJ SC injection INJECT 2.4MG INTO THE SKIN EVERY 7 DAYS 3 mL 1    olmesartan (BENICAR) 40 MG tablet TAKE 1 TABLET BY MOUTH DAILY 90 tablet 1    ondansetron (ZOFRAN ODT) 8 MG TBDP disintegrating tablet Take 1 tablet by mouth every 8 hours as needed for Nausea 20 tablet 2    citalopram (CELEXA) 20 MG tablet Take 1 tablet by mouth daily 30 tablet 3    cetirizine (ZYRTEC) 10 MG tablet Take 1 tablet by mouth daily 30 tablet 5    montelukast (SINGULAIR) 10 MG tablet TAKE 1 TABLET BY MOUTH DAILY 30 tablet 5    rosuvastatin (CRESTOR) 10 MG tablet Take 1 tablet by mouth daily 30 tablet 5    amLODIPine (NORVASC) 10 MG tablet TAKE 1 TABLET BY MOUTH DAILY 30 tablet 5    hydroxychloroquine (PLAQUENIL) 200 MG tablet Take 200 mg by mouth 2 times daily      blood glucose test strips (FREESTYLE LITE) strip 1 each by In Vitro route 2 times daily Indications: Impaired Glucose Tolerance Routinely due to elevated blood sugars 100 each 3    doxazosin (CARDURA) 8 MG tablet TAKE 1 TABLET BY MOUTH EVERY NIGHT 90 tablet 1    leflunomide (ARAVA) 20 MG tablet Take 20 mg by mouth daily      Cholecalciferol (VITAMIN D) 2000 units TABS tablet Take 1 tablet by mouth daily After finishing 12 week couse 30 tablet 11    aspirin (ASPIRIN LOW DOSE) 81 MG tablet Take 1 tablet by mouth daily 365 tablet 0     No current facility-administered medications for this visit. Allergies   Allergen Reactions    Dye  [Barium-Containing Compounds]      Other reaction(s): sneezing    Iodine Other (See Comments)     IVP dye reaction. Could not stop sneezing. Used a second time, and took benadryl prior and no issues.  Lisinopril Other (See Comments)     Cough         Review of Systems    Objective:  Ht 6' 1\" (1.854 m)   BMI 36.94 kg/m²     BP Readings from Last 3 Encounters:   03/16/22 128/78   01/21/22 (!) 127/59   01/21/22 (!) 160/84       Pulse Readings from Last 3 Encounters:   01/21/22 74   12/16/21 75   08/17/21 78       Wt Readings from Last 3 Encounters:   03/16/22 280 lb (127 kg)   01/21/22 282 lb 10.1 oz (128.2 kg)   12/16/21 270 lb (122.5 kg)       Physical Exam  Constitutional:       General: He is not in acute distress. Appearance: He is well-developed. Eyes:      General: No scleral icterus. Conjunctiva/sclera: Conjunctivae normal.   Cardiovascular:      Rate and Rhythm: Normal rate and regular rhythm. Heart sounds: Normal heart sounds. No murmur heard. No gallop. Pulmonary:      Effort: Pulmonary effort is normal. No respiratory distress. Breath sounds: Normal breath sounds. No wheezing, rhonchi or rales. Abdominal:      General: Bowel sounds are normal. There is no distension. Palpations: Abdomen is soft. Tenderness: There is no abdominal tenderness. Skin:     Findings: No erythema or rash. Neurological:      Mental Status: He is alert. Assessment/Plan:      Diagnosis Orders   1. Essential hypertension     2. MONISHA (obstructive sleep apnea)     3. Rheumatoid arthritis involving multiple sites, unspecified whether rheumatoid factor present ([address]     4. Temporal arteritis ([address]     5. PMR (polymyalgia rheumatica) (HCC)     6. Hypercholesteremia     7. Chronic rhinitis     8. Anxiety     9.  Raised intraocular pressure, unspecified laterality f/u rheum - arava, plaquenil, humira seems to be helping significantly -not completely  F/u w/ pmr for possible KASSI  Finishing PT  Cont cpap for karan  Routine f/u eye doctor for pressure change  Naproxen for now - reduced gabapentin 2/2 gi se's - max dose to 1200 - 600 bid  Cont wegovy - continued weight loss -diet/ activity d/w pt  On nasal steroid daily - ? Allergies / resolved sinusitis  bp stable on benicar, cardura, norvasc  Cont statin  F/u 6 months/ prn  Cont vit d - will need vit d, psa, lipid w/ rheum labs  celexa working well.     Alexandra Sanders MD, MD  6/16/2022  8:48 AM

## 2022-06-23 ENCOUNTER — TELEPHONE (OUTPATIENT)
Dept: FAMILY MEDICINE CLINIC | Age: 57
End: 2022-06-23

## 2022-06-23 DIAGNOSIS — U07.1 COVID-19: Primary | ICD-10-CM

## 2022-06-23 NOTE — TELEPHONE ENCOUNTER
Patient just took a covid test today and it was positive. He did it twice and both were positive. His symptoms started on 6/21/22. What can we do for him? Just had his booster shot on 6/9/22. Please give her a call back. Tracy Layton [place] [address] -- PHONE NO. [phone]    SHE WOULD LIKE TO KNOW IF WE COULD ORDER: PAXLOVID?

## 2022-06-28 ENCOUNTER — E-VISIT (OUTPATIENT)
Dept: PRIMARY CARE CLINIC | Age: 57
End: 2022-06-28
Payer: COMMERCIAL

## 2022-06-28 DIAGNOSIS — J06.9 UPPER RESPIRATORY TRACT INFECTION, UNSPECIFIED TYPE: Primary | ICD-10-CM

## 2022-06-28 PROCEDURE — 99422 OL DIG E/M SVC 11-20 MIN: CPT | Performed by: NURSE PRACTITIONER

## 2022-06-28 RX ORDER — AZITHROMYCIN 250 MG/1
TABLET, FILM COATED ORAL
Qty: 6 TABLET | Refills: 0 | Status: SHIPPED | OUTPATIENT
Start: 2022-06-28 | End: 2022-07-08

## 2022-06-28 ASSESSMENT — LIFESTYLE VARIABLES: SMOKING_STATUS: NO, I HAVE NEVER SMOKED

## 2022-06-29 ENCOUNTER — PATIENT MESSAGE (OUTPATIENT)
Dept: FAMILY MEDICINE CLINIC | Age: 57
End: 2022-06-29

## 2022-06-29 NOTE — TELEPHONE ENCOUNTER
From: Oziel Sanches  To: Randy Brandon  Sent: 6/29/2022 4:40 PM EDT  Subject: Appointment     Just got the voicemail about skipping tomorrow's appointment to give my antibiotics some time. I will call next week to reschedule, thank you very much.

## 2022-06-30 ENCOUNTER — TELEPHONE (OUTPATIENT)
Dept: ADMINISTRATIVE | Age: 57
End: 2022-06-30

## 2022-06-30 RX ORDER — SEMAGLUTIDE 2.4 MG/.75ML
INJECTION, SOLUTION SUBCUTANEOUS
Qty: 3 ML | Refills: 1 | Status: SHIPPED | OUTPATIENT
Start: 2022-06-30 | End: 2022-07-13 | Stop reason: SDUPTHER

## 2022-06-30 NOTE — TELEPHONE ENCOUNTER
Pt needs a refill on his  Wegovy 2.4 mg weekly injection      Please send to [facility] [address] would like to  today or tomorrow he is leaving Saturday to go out of town.

## 2022-07-11 ENCOUNTER — PATIENT MESSAGE (OUTPATIENT)
Dept: FAMILY MEDICINE CLINIC | Age: 57
End: 2022-07-11

## 2022-07-12 NOTE — TELEPHONE ENCOUNTER
From: Joana Benitez  To: Dr. Jaimie Chamorro: 7/11/2022 6:03 PM EDT  Subject: Aaron Perales can you check and see if it's possible to write my next [facility] prescription as a 80 day? Thank you.

## 2022-07-13 RX ORDER — SEMAGLUTIDE 2.4 MG/.75ML
INJECTION, SOLUTION SUBCUTANEOUS
Qty: 9 ML | Refills: 1 | Status: SHIPPED | OUTPATIENT
Start: 2022-07-13

## 2022-07-22 DIAGNOSIS — G47.00 INSOMNIA, UNSPECIFIED TYPE: ICD-10-CM

## 2022-07-25 RX ORDER — SUVOREXANT 20 MG/1
TABLET, FILM COATED ORAL
Qty: 30 TABLET | Refills: 2 | Status: SHIPPED | OUTPATIENT
Start: 2022-07-25 | End: 2022-08-24

## 2022-08-05 ENCOUNTER — OFFICE VISIT (OUTPATIENT)
Dept: FAMILY MEDICINE CLINIC | Age: 57
End: 2022-08-05
Payer: COMMERCIAL

## 2022-08-05 VITALS
DIASTOLIC BLOOD PRESSURE: 62 MMHG | HEART RATE: 59 BPM | BODY MASS INDEX: 36.71 KG/M2 | OXYGEN SATURATION: 98 % | WEIGHT: 277 LBS | HEIGHT: 73 IN | SYSTOLIC BLOOD PRESSURE: 132 MMHG

## 2022-08-05 DIAGNOSIS — R55 SYNCOPE, UNSPECIFIED SYNCOPE TYPE: ICD-10-CM

## 2022-08-05 DIAGNOSIS — J31.0 CHRONIC RHINITIS: ICD-10-CM

## 2022-08-05 DIAGNOSIS — G47.33 OSA (OBSTRUCTIVE SLEEP APNEA): ICD-10-CM

## 2022-08-05 DIAGNOSIS — F41.9 ANXIETY: ICD-10-CM

## 2022-08-05 DIAGNOSIS — G47.00 INSOMNIA, UNSPECIFIED TYPE: ICD-10-CM

## 2022-08-05 DIAGNOSIS — M35.3 PMR (POLYMYALGIA RHEUMATICA) (HCC): ICD-10-CM

## 2022-08-05 DIAGNOSIS — I10 ESSENTIAL HYPERTENSION: Primary | ICD-10-CM

## 2022-08-05 PROCEDURE — 99214 OFFICE O/P EST MOD 30 MIN: CPT | Performed by: FAMILY MEDICINE

## 2022-08-05 RX ORDER — IPRATROPIUM BROMIDE 42 UG/1
1-2 SPRAY, METERED NASAL EVERY 6 HOURS PRN
Qty: 15 ML | Refills: 2 | Status: SHIPPED | OUTPATIENT
Start: 2022-08-05

## 2022-08-05 SDOH — ECONOMIC STABILITY: FOOD INSECURITY: WITHIN THE PAST 12 MONTHS, YOU WORRIED THAT YOUR FOOD WOULD RUN OUT BEFORE YOU GOT MONEY TO BUY MORE.: NEVER TRUE

## 2022-08-05 SDOH — ECONOMIC STABILITY: FOOD INSECURITY: WITHIN THE PAST 12 MONTHS, THE FOOD YOU BOUGHT JUST DIDN'T LAST AND YOU DIDN'T HAVE MONEY TO GET MORE.: NEVER TRUE

## 2022-08-05 SDOH — ECONOMIC STABILITY: TRANSPORTATION INSECURITY
IN THE PAST 12 MONTHS, HAS THE LACK OF TRANSPORTATION KEPT YOU FROM MEDICAL APPOINTMENTS OR FROM GETTING MEDICATIONS?: NO

## 2022-08-05 SDOH — ECONOMIC STABILITY: TRANSPORTATION INSECURITY
IN THE PAST 12 MONTHS, HAS LACK OF TRANSPORTATION KEPT YOU FROM MEETINGS, WORK, OR FROM GETTING THINGS NEEDED FOR DAILY LIVING?: NO

## 2022-08-05 ASSESSMENT — SOCIAL DETERMINANTS OF HEALTH (SDOH): HOW HARD IS IT FOR YOU TO PAY FOR THE VERY BASICS LIKE FOOD, HOUSING, MEDICAL CARE, AND HEATING?: NOT HARD AT ALL

## 2022-08-05 NOTE — PROGRESS NOTES
[facility] [address] Note    Date: 8/5/2022                                               Subjective:     Chief Complaint   Patient presents with    Follow-Up from [facility] - 7/22/22 - JANNIE VAZQUEZ  Had syncope 2018 - has happened in past but not until recently - in hospital 7/21  Seen by cardiology recently  Had eye check for plaquenil yesterday - pressures okay  Seen by NAILARAHUL [place] yesterday  Rechecking blood count again in 2 weeks - wbc 3.4 down for him - hgb 12.4  Energy lower this week  More tired generally  Hasn't received holter monitor yet  Belsomra for sleep -helps usually if gets in right time frame  On meds for rheum condition  Admitted overnight 7/21 for syncope/ transient confusion/ acute encephalopathy  Couple episodes since then when experienced pre-syncope  Stopped flexeril after RAOUL  Cardura taking now at night - off zyrtec  Taking gabapentin 300 evening and at night w/ belsomra 20 nightly  Reviewed d/c note from [facility] 7/21-7/22  Had hortencia resolved w/ ivf - off naproxen other than prn  A lot of sinus drainage - at times congestion - on singulair daily. No recent palpitations - has occasionally but not related to syncope?   On cardura for bp/ bph - urine flow generally good and bp doing well  Picking up wegovy today  Had raoul/ injection left knee - hopes to  activity    BP Readings from Last 3 Encounters:   08/05/22 132/62   06/16/22 130/82   03/16/22 128/78     Pulse Readings from Last 3 Encounters:   08/05/22 59   06/16/22 82   01/21/22 74     Wt Readings from Last 3 Encounters:   08/05/22 277 lb (125.6 kg)   06/16/22 276 lb (125.2 kg)   03/16/22 280 lb (127 kg)            Patient Active Problem List    Diagnosis Date Noted    Insomnia 08/17/2021    Anxiety 03/19/2021    Elevated liver enzymes 11/01/2019    Low testosterone 11/01/2019    Increased intraocular pressure     PMR (polymyalgia rheumatica) (HCC) 08/03/2018    Temporal arteritis ([address] 08/03/2018 the Act, 21 U. S.C. 302WXZ-3 (b) (1),  unless the authorization is terminated or revoked sooner.     Patient Fact [email or web address]  Provider Fact [employer].    METHODOLOGY: RT PCR       Family History   Problem Relation Age of Onset    High Blood Pressure Mother     Cancer Father         testicular, multiple myeloma    Heart Attack Father 47        CABG 4V    Other Father         MRSA    No Known Problems Brother     Early Death Maternal Uncle      Current Outpatient Medications   Medication Sig Dispense Refill    Suvorexant (BELSOMRA) 20 MG TABS TAKE 1 TABLET BY MOUTH AT BEDTIME 30 tablet 2    Semaglutide-Weight Management (WEGOVY) 2.4 MG/0.75ML SOAJ SC injection INJECT 2.4MG INTO THE SKIN EVERY 7 DAYS 9 mL 1    olmesartan (BENICAR) 40 MG tablet TAKE 1 TABLET BY MOUTH DAILY 90 tablet 1    ondansetron (ZOFRAN ODT) 8 MG TBDP disintegrating tablet Take 1 tablet by mouth every 8 hours as needed for Nausea 20 tablet 2    citalopram (CELEXA) 20 MG tablet Take 1 tablet by mouth daily 30 tablet 3    cetirizine (ZYRTEC) 10 MG tablet Take 1 tablet by mouth daily 30 tablet 5    montelukast (SINGULAIR) 10 MG tablet TAKE 1 TABLET BY MOUTH DAILY 30 tablet 5    rosuvastatin (CRESTOR) 10 MG tablet Take 1 tablet by mouth daily 30 tablet 5    amLODIPine (NORVASC) 10 MG tablet TAKE 1 TABLET BY MOUTH DAILY 30 tablet 5    hydroxychloroquine (PLAQUENIL) 200 MG tablet Take 200 mg by mouth 2 times daily      blood glucose test strips (FREESTYLE LITE) strip 1 each by In Vitro route 2 times daily Indications: Impaired Glucose Tolerance Routinely due to elevated blood sugars 100 each 3    doxazosin (CARDURA) 8 MG tablet TAKE 1 TABLET BY MOUTH EVERY NIGHT 90 tablet 1    leflunomide (ARAVA) 20 MG tablet Take 20 mg by mouth daily      Cholecalciferol (VITAMIN D) 2000 units TABS tablet Take 1 tablet by mouth daily After finishing 12 week couse 30 tablet 11    aspirin (ASPIRIN LOW DOSE) 81 MG tablet Take 1 tablet by mouth daily 365 tablet 0     No current facility-administered medications for this visit. Allergies   Allergen Reactions    Dye  [Barium-Containing Compounds]      Other reaction(s): sneezing    Iodine Other (See Comments)     IVP dye reaction. Could not stop sneezing. Used a second time, and took benadryl prior and no issues. Lisinopril Other (See Comments)     Cough         Review of Systems    Objective:  /62 (Site: Left Upper Arm, Position: Sitting, Cuff Size: Large Adult)   Pulse 59   Ht 6' 1\" (1.854 m)   Wt 277 lb (125.6 kg) Comment: shoes on  SpO2 98%   BMI 36.55 kg/m²     BP Readings from Last 3 Encounters:   08/05/22 132/62   06/16/22 130/82   03/16/22 128/78       Pulse Readings from Last 3 Encounters:   08/05/22 59   06/16/22 82   01/21/22 74       Wt Readings from Last 3 Encounters:   08/05/22 277 lb (125.6 kg)   06/16/22 276 lb (125.2 kg)   03/16/22 280 lb (127 kg)       Physical Exam  Constitutional:       General: He is not in acute distress. Appearance: He is well-developed. Eyes:      General: No scleral icterus. Conjunctiva/sclera: Conjunctivae normal.   Cardiovascular:      Rate and Rhythm: Normal rate and regular rhythm. Heart sounds: Normal heart sounds. No murmur heard. No gallop. Pulmonary:      Effort: Pulmonary effort is normal. No respiratory distress. Breath sounds: Normal breath sounds. No wheezing, rhonchi or rales. Abdominal:      General: Bowel sounds are normal. There is no distension. Palpations: Abdomen is soft. Tenderness: There is no abdominal tenderness. Musculoskeletal:         General: No swelling. Skin:     Findings: No erythema or rash. Neurological:      Mental Status: He is alert. Assessment/Plan:      Diagnosis Orders   1. Essential hypertension        2. Syncope, unspecified syncope type        3. MONISHA (obstructive sleep apnea)        4.  PMR (polymyalgia rheumatica) ([facility] [address]        5. Chronic rhinitis        6. Anxiety        7. Insomnia, unspecified type        [place] for weight loss  Cont belsomra for sleep  Monitor skin spot on left clavicular area  Cont celexa for mood w/ belsomra for sleep  F/u back specialist for injections - on gabapentin presently - hold on increase in dose  Awaiting holter monitor  F/u cardio   Hydrate well encouraged  Reviewed hospital note/ labs  Holding on flexeril/ zyrtec  Singulair 10 daily w/ flonase   Atrovent prn drainage in lieu   Bp okay on norvasc 10 daily / benicar- consider hold cardura or at least reduce by 1/2 w/ bp monitoring - can change to different med if needed.   On arava/ plaquenil per rheum  Cont statin  Linnea Du MD, MD  8/5/2022  11:59 AM

## 2022-08-08 RX ORDER — CITALOPRAM 20 MG/1
20 TABLET ORAL DAILY
Qty: 30 TABLET | Refills: 3 | Status: SHIPPED | OUTPATIENT
Start: 2022-08-08

## 2022-08-08 RX ORDER — CITALOPRAM 20 MG/1
20 TABLET ORAL DAILY
Qty: 30 TABLET | Refills: 3 | OUTPATIENT
Start: 2022-08-08

## 2022-08-08 NOTE — TELEPHONE ENCOUNTER
citalopram (CELEXA) 20 MG tablet 30 tablet 3 8/8/2022     Sig - Route:  Take 1 tablet by mouth in the morning. - Oral    Sent to pharmacy as: Citalopram Hydrobromide 20 MG Oral Tablet (CeleXA)    Cosign for Ordering: Required by Valerie Servin MD    E-Prescribing Status: Receipt confirmed by pharmacy (8/8/2022  1:35 PM EDT)        DUPLICATE REQUEST

## 2022-09-07 ENCOUNTER — TELEPHONE (OUTPATIENT)
Dept: ADMINISTRATIVE | Age: 57
End: 2022-09-07

## 2022-09-07 DIAGNOSIS — G47.00 INSOMNIA, UNSPECIFIED TYPE: ICD-10-CM

## 2022-09-07 RX ORDER — SUVOREXANT 20 MG/1
TABLET, FILM COATED ORAL
Qty: 30 TABLET | Refills: 2 | Status: SHIPPED | OUTPATIENT
Start: 2022-09-07 | End: 2022-10-07

## 2022-09-07 NOTE — TELEPHONE ENCOUNTER
RECEIVED A PA FOR BELSOMRA. However the script in the chart is . Is the patient taking this? If so I need a new prescription in the chart. Please advise thank you. If this requires a response please respond to the pool. [address]).

## 2022-09-07 NOTE — TELEPHONE ENCOUNTER
Submitted PA for Belsomra   Via [facility] Key: [id number] STATUS: \"The member recently filled this medication and will be able to return for their next refill according to their plan limits. \"      If this requires a response please respond to the pool. [address]). Please advise patient thank you.

## 2022-09-14 DIAGNOSIS — I10 ESSENTIAL HYPERTENSION: ICD-10-CM

## 2022-09-14 RX ORDER — OLMESARTAN MEDOXOMIL 40 MG/1
40 TABLET ORAL DAILY
Qty: 90 TABLET | Refills: 1 | Status: SHIPPED | OUTPATIENT
Start: 2022-09-14

## 2022-10-07 ENCOUNTER — TELEPHONE (OUTPATIENT)
Dept: ADMINISTRATIVE | Age: 57
End: 2022-10-07

## 2022-10-07 NOTE — TELEPHONE ENCOUNTER
Submitted PA for [facility]   Via [facility] Key: [id number] STATUS: APPROVED. LETTER ATTACHED. If this requires a response please respond to the pool. [address]). Please advise patient thank you.

## 2022-10-28 ENCOUNTER — TELEPHONE (OUTPATIENT)
Dept: ADMINISTRATIVE | Age: 57
End: 2022-10-28

## 2022-10-28 NOTE — TELEPHONE ENCOUNTER
Submitted PA for [facility]  Via [facility] Key: [id number] STATUS: APPROVED THROUGH 04/29/2023. If this requires a response please respond to the pool. [address]). Please advise patient thank you.

## 2022-12-08 DIAGNOSIS — R06.02 SOB (SHORTNESS OF BREATH): Primary | ICD-10-CM

## 2022-12-14 ENCOUNTER — HOSPITAL ENCOUNTER (OUTPATIENT)
Dept: GENERAL RADIOLOGY | Age: 57
Discharge: HOME | End: 2022-12-14
Payer: COMMERCIAL

## 2022-12-14 ENCOUNTER — NURSE ONLY (OUTPATIENT)
Dept: FAMILY MEDICINE CLINIC | Age: 57
End: 2022-12-14
Payer: COMMERCIAL

## 2022-12-14 ENCOUNTER — HOSPITAL ENCOUNTER (OUTPATIENT)
Age: 57
Discharge: HOME | End: 2022-12-14
Payer: COMMERCIAL

## 2022-12-14 DIAGNOSIS — R06.02 SOB (SHORTNESS OF BREATH): ICD-10-CM

## 2022-12-14 DIAGNOSIS — R73.03 PREDIABETES: ICD-10-CM

## 2022-12-14 DIAGNOSIS — Z12.5 PROSTATE CANCER SCREENING: ICD-10-CM

## 2022-12-14 DIAGNOSIS — E78.00 HYPERCHOLESTEREMIA: ICD-10-CM

## 2022-12-14 DIAGNOSIS — E55.9 VITAMIN D DEFICIENCY: ICD-10-CM

## 2022-12-14 LAB
A/G RATIO: 2.2 (ref 1.1–2.2)
ALBUMIN SERPL-MCNC: 4.4 G/DL (ref 3.4–5)
ALP BLD-CCNC: 65 U/L (ref 40–129)
ALT SERPL-CCNC: 27 U/L (ref 10–40)
ANION GAP SERPL CALCULATED.3IONS-SCNC: 10 MMOL/L (ref 3–16)
AST SERPL-CCNC: 19 U/L (ref 15–37)
BILIRUB SERPL-MCNC: 0.7 MG/DL (ref 0–1)
BUN BLDV-MCNC: 14 MG/DL (ref 7–20)
CALCIUM SERPL-MCNC: 9.5 MG/DL (ref 8.3–10.6)
CHLORIDE BLD-SCNC: 103 MMOL/L (ref 99–110)
CHOLESTEROL, TOTAL: 223 MG/DL (ref 0–199)
CO2: 28 MMOL/L (ref 21–32)
CREAT SERPL-MCNC: 1.2 MG/DL (ref 0.9–1.3)
D DIMER: 0.46 UG/ML FEU (ref 0–0.6)
ESTIMATED AVERAGE GLUCOSE: 102.5 MG/DL
GFR SERPL CREATININE-BSD FRML MDRD: >60 ML/MIN/{1.73_M2}
GLUCOSE BLD-MCNC: 87 MG/DL (ref 70–99)
HBA1C MFR BLD: 5.2 %
HDLC SERPL-MCNC: 68 MG/DL (ref 40–60)
LDL CHOLESTEROL CALCULATED: 123 MG/DL
POTASSIUM SERPL-SCNC: 4 MMOL/L (ref 3.5–5.1)
PRO-BNP: <5 PG/ML (ref 0–124)
PROSTATE SPECIFIC ANTIGEN: 0.48 NG/ML (ref 0–4)
SODIUM BLD-SCNC: 141 MMOL/L (ref 136–145)
TOTAL PROTEIN: 6.4 G/DL (ref 6.4–8.2)
TRIGL SERPL-MCNC: 160 MG/DL (ref 0–150)
VITAMIN D 25-HYDROXY: 22.2 NG/ML
VLDLC SERPL CALC-MCNC: 32 MG/DL

## 2022-12-14 PROCEDURE — 36415 COLL VENOUS BLD VENIPUNCTURE: CPT | Performed by: FAMILY MEDICINE

## 2022-12-14 PROCEDURE — 71046 X-RAY EXAM CHEST 2 VIEWS: CPT

## 2022-12-16 ENCOUNTER — OFFICE VISIT (OUTPATIENT)
Dept: FAMILY MEDICINE CLINIC | Age: 57
End: 2022-12-16
Payer: COMMERCIAL

## 2022-12-16 VITALS
HEART RATE: 82 BPM | HEIGHT: 73 IN | DIASTOLIC BLOOD PRESSURE: 86 MMHG | BODY MASS INDEX: 36.05 KG/M2 | WEIGHT: 272 LBS | OXYGEN SATURATION: 97 % | SYSTOLIC BLOOD PRESSURE: 130 MMHG

## 2022-12-16 DIAGNOSIS — G47.00 INSOMNIA, UNSPECIFIED TYPE: ICD-10-CM

## 2022-12-16 DIAGNOSIS — J31.0 CHRONIC RHINITIS: ICD-10-CM

## 2022-12-16 DIAGNOSIS — E80.4 GILBERT SYNDROME: ICD-10-CM

## 2022-12-16 DIAGNOSIS — M35.3 PMR (POLYMYALGIA RHEUMATICA) (HCC): ICD-10-CM

## 2022-12-16 DIAGNOSIS — E78.00 HYPERCHOLESTEREMIA: ICD-10-CM

## 2022-12-16 DIAGNOSIS — I10 PRIMARY HYPERTENSION: Primary | ICD-10-CM

## 2022-12-16 DIAGNOSIS — R06.02 SOB (SHORTNESS OF BREATH): ICD-10-CM

## 2022-12-16 DIAGNOSIS — R74.8 ELEVATED LIVER ENZYMES: ICD-10-CM

## 2022-12-16 DIAGNOSIS — G47.33 OSA (OBSTRUCTIVE SLEEP APNEA): ICD-10-CM

## 2022-12-16 DIAGNOSIS — E55.9 VITAMIN D DEFICIENCY: ICD-10-CM

## 2022-12-16 PROCEDURE — 3078F DIAST BP <80 MM HG: CPT | Performed by: FAMILY MEDICINE

## 2022-12-16 PROCEDURE — 99214 OFFICE O/P EST MOD 30 MIN: CPT | Performed by: FAMILY MEDICINE

## 2022-12-16 PROCEDURE — 3074F SYST BP LT 130 MM HG: CPT | Performed by: FAMILY MEDICINE

## 2022-12-16 RX ORDER — CITALOPRAM 40 MG/1
40 TABLET ORAL DAILY
Qty: 30 TABLET | Refills: 5 | Status: SHIPPED | OUTPATIENT
Start: 2022-12-16

## 2022-12-16 RX ORDER — ROSUVASTATIN CALCIUM 20 MG/1
20 TABLET, COATED ORAL DAILY
Qty: 30 TABLET | Refills: 5 | Status: SHIPPED | OUTPATIENT
Start: 2022-12-16

## 2022-12-16 NOTE — PROGRESS NOTES
[facility]  Clinic Note    Date: 12/16/2022                                               Subjective:     Chief Complaint   Patient presents with    Hypertension     HTN ROUTINE FOLLOW UP      HPI  Mood okay w/ celexa -not as effective as in past  Some soboe - long walks/ steps - more winded than usual  Oxygen sat in mid to upper 90's usually - occ 92%  Recent labs done -and pt wants to review  Diet about the same  Weight down to 259 at home w/o clothes - but gained 6-7 pounds in last week - taking wegovy every Thursday - weight up to 290 at one point. At maximum dose presently  Would like to get down 10# at a time - short-term goal  Got RAOUL in back - back feeling better - 2nd one  Knee pain more creaky again - had arthroscope in past  Some of knee pain may be related to back. Doing exercise routine w/ daughter - better able to exercise w/ raoul  Sinuses not as bad lately but still some sneezing/ runny nose - usually seasonal but now more daily for awhile - zyrtec/ nasal steroid  Singulair didn't seem to help - out of this  Using melatonin for sleep - belsomra not very effective - 10mg melatonin helps some - not great.   Home bp machine decent - 130/80's  Seen by cardio recently - hr geoffrey - had orthostatic syncope - dr. Evgeny Fuentes  BP Readings from Last 3 Encounters:   12/16/22 130/86   08/05/22 132/62   06/16/22 130/82     Pulse Readings from Last 3 Encounters:   12/16/22 82   08/05/22 59   06/16/22 82     Wt Readings from Last 3 Encounters:   12/16/22 272 lb (123.4 kg)   08/05/22 277 lb (125.6 kg)   06/16/22 276 lb (125.2 kg)            Patient Active Problem List    Diagnosis Date Noted    Insomnia 08/17/2021    Anxiety 03/19/2021    Elevated liver enzymes 11/01/2019    Low testosterone 11/01/2019    Increased intraocular pressure     PMR (polymyalgia rheumatica) (HCC) 08/03/2018    Temporal arteritis ([address] 08/03/2018    Lipoma of anterior chest wall 06/28/2018    Testosterone deficiency 04/06/2017 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 Gastritis 06/21/2018    EGD, DR CARTER GARCIA, MILD CHRONIC GASTRITIS. GERD (gastroesophageal reflux disease)     Hyperlipidemia     Hyperplastic rectal polyp 06/21/2018    COLONOSCOPY, DR CARTER GARCIA, HYPERPLASTIC POLYP RECTAL. Hypertension     Increased intraocular pressure     Migraine     Pneumonia     x 3    Sleep apnea     Testosterone deficiency 4/6/2017     Past Surgical History:   Procedure Laterality Date    CHEST WALL RESECTION Left 1/21/2022    REMOVAL OF 4 CENTIMETER LIPOMA: LEFT UPPER CHEST performed by Power Eric MD at [address] [place] [address], LAPAROSCOPIC  8-11-11    COLONOSCOPY  06/21/2018    COLONOSCOPY, DR CARTER GARCIA, COLON POLYPS X2, RECTAL POLYP X1. KNEE ARTHROSCOPY Bilateral     KNEE SURGERY Right 12/07/2020    meniscus repair    NASAL SEPTUM SURGERY      and palatoplasty     Nurse Only on 12/14/2022   Component Date Value Ref Range Status    Pro-BNP 12/14/2022 <5  0 - 124 pg/mL Final    Comment: Methodology by NT-proBNP    An age-independent cutoff point of 300 pg/ml has a 98%  negative predictive value excluding acute heart failure. Values exceeding the age-related cutoff values (450 pg/mL if  age<50, 900 if 50-75 and 1800 if >75) has 90% sensitivity and  84% specificity for diagnosing acute HF. In patients with  renal compromise (eGFR<60) values greater than 1200pg/ml have  a diagnostic sensitivity and specificity of 89% and 72% for  acute HF. D-Dimer, Quant 12/14/2022 0.46  0.00 - 0.60 ug/mL FEU Final    Comment: Effective 5/25/22 the D-Dimer methodology has changed. Please  note the reference range and unit of measure are significantly  different. STA-Liatest D-Dimer is an immuno-turbidimetric  assay for the quantitative determination of D-Dimer in human  citrated venous plasma on STA Coagulation systems for use in  conjunction with clinical pretest probability (PTP) assessment  model to exclude Pulmonary embolism (PE) and deep vein thrombosis  (DVT)in outpatients suspected of PE or DVT. D-Dimer levels are  mainly elevated in cases of DVT/PE. Other conditions may lead  to a high D-Dimer level including old age, pregnancy, peripheral  arteriopathy, DIC, coronary artery disease, thrombolytic treatment,  cancer, liver disease, infection, inflammation, hematoma, and  rheumatoid arthritis. Specimen interferences are created by  lipemia, bilirubin, and hemolysis. Normal cutoff value for DVT  is <0.50 µg/ml FEU. Age specific cutoff values are calculated  over the age of 48 by adding 0.01 to the tyler                           l cutoff value  (i.e., 46years of age cutoff value is <0.51 µ/ml FEU)      Vit D, 25-Hydroxy 12/14/2022 22.2 (A)  >=30 ng/mL Final    Comment: <=20 ng/mL. ........... Pooja Lazarocher Deficient  21-29 ng/mL. ......... Pooja Hernandez Insufficient  >=30 ng/mL. ........ Pooja Henrandez Sufficient      PSA 12/14/2022 0.48  0.00 - 4.00 ng/mL Final    Hemoglobin A1C 12/14/2022 5.2  See comment % Final    Comment: Comment:  Diagnosis of Diabetes: > or = 6.5%  Increased risk of diabetes (Prediabetes): 5.7-6.4%  Glycemic Control: Nonpregnant Adults: <7.0%                    Pregnant: <6.0%        eAG 12/14/2022 102.5  mg/dL Final    Sodium 12/14/2022 141  136 - 145 mmol/L Final    Potassium 12/14/2022 4.0  3.5 - 5.1 mmol/L Final    Chloride 12/14/2022 103  99 - 110 mmol/L Final    CO2 12/14/2022 28  21 - 32 mmol/L Final    Anion Gap 12/14/2022 10  3 - 16 Final    Glucose 12/14/2022 87  70 - 99 mg/dL Final    BUN 12/14/2022 14  7 - 20 mg/dL Final    Creatinine 12/14/2022 1.2  0.9 - 1.3 mg/dL Final    Est, Glom Filt Rate 12/14/2022 >60  >60 Final    Comment: Pediatric calculator link  [email or web address] [email or web address]/kdoqi/gfr_[email or web address]  Effective Oct 3, 2022  These results are not intended for use in patients  <25years of age. eGFR results are calculated without  a race factor using the 2021 CKD-EPI equation. Careful  clinical correlation is recommended, particularly when  comparing to results calculated using previous equations. The CKD-EPI equation is less accurate in patients with  extremes of muscle mass, extra-renal metabolism of  creatinine, excessive creatinine ingestion, or following  therapy that affects renal tubular secretion. Calcium 12/14/2022 9.5  8.3 - 10.6 mg/dL Final    Total Protein 12/14/2022 6.4  6.4 - 8.2 g/dL Final    Albumin 12/14/2022 4.4  3.4 - 5.0 g/dL Final    Albumin/Globulin Ratio 12/14/2022 2.2  1.1 - 2.2 Final    Total Bilirubin 12/14/2022 0.7  0.0 - 1.0 mg/dL Final    Alkaline Phosphatase 12/14/2022 65  40 - 129 U/L Final    ALT 12/14/2022 27  10 - 40 U/L Final    AST 12/14/2022 19  15 - 37 U/L Final    Cholesterol, Total 12/14/2022 223 (A)  0 - 199 mg/dL Final    Triglycerides 12/14/2022 160 (A)  0 - 150 mg/dL Final    HDL 12/14/2022 68 (A)  40 - 60 mg/dL Final    Comment: An HDL cholesterol less than 40 mg/dL is low and  constitutes a coronary heart disease risk factor. An HDL cholesterol greater than 60 mg/dL is a  negative risk factor for coronary heart disease.       LDL Calculated 12/14/2022 123 (A)  <100 mg/dL Final    VLDL Cholesterol Calculated 12/14/2022 32  Not Established mg/dL Final     Family History   Problem Relation Age of Onset    High Blood Pressure Mother     Cancer Father         testicular, multiple myeloma    Heart Attack Father 47        CABG 4V    Other Father         MRSA    No Known Problems Brother     Early Death Maternal Uncle Current Outpatient Medications   Medication Sig Dispense Refill    olmesartan (BENICAR) 40 MG tablet TAKE 1 TABLET BY MOUTH DAILY 90 tablet 1    citalopram (CELEXA) 20 MG tablet Take 1 tablet by mouth in the morning. 30 tablet 3    ipratropium (ATROVENT) 0.06 % nasal spray 1-2 sprays by Each Nostril route every 6 hours as needed for Rhinitis 15 mL 2    Semaglutide-Weight Management (WEGOVY) 2.4 MG/0.75ML SOAJ SC injection INJECT 2.4MG INTO THE SKIN EVERY 7 DAYS 9 mL 1    ondansetron (ZOFRAN ODT) 8 MG TBDP disintegrating tablet Take 1 tablet by mouth every 8 hours as needed for Nausea 20 tablet 2    cetirizine (ZYRTEC) 10 MG tablet Take 1 tablet by mouth daily 30 tablet 5    montelukast (SINGULAIR) 10 MG tablet TAKE 1 TABLET BY MOUTH DAILY 30 tablet 5    rosuvastatin (CRESTOR) 10 MG tablet Take 1 tablet by mouth daily 30 tablet 5    amLODIPine (NORVASC) 10 MG tablet TAKE 1 TABLET BY MOUTH DAILY 30 tablet 5    hydroxychloroquine (PLAQUENIL) 200 MG tablet Take 200 mg by mouth 2 times daily      blood glucose test strips (FREESTYLE LITE) strip 1 each by In Vitro route 2 times daily Indications: Impaired Glucose Tolerance Routinely due to elevated blood sugars 100 each 3    doxazosin (CARDURA) 8 MG tablet TAKE 1 TABLET BY MOUTH EVERY NIGHT 90 tablet 1    leflunomide (ARAVA) 20 MG tablet Take 20 mg by mouth daily      Cholecalciferol (VITAMIN D) 2000 units TABS tablet Take 1 tablet by mouth daily After finishing 12 week couse 30 tablet 11    aspirin (ASPIRIN LOW DOSE) 81 MG tablet Take 1 tablet by mouth daily 365 tablet 0     No current facility-administered medications for this visit. Allergies   Allergen Reactions    Dye  [Barium-Containing Compounds]      Other reaction(s): sneezing    Iodine Other (See Comments)     IVP dye reaction. Could not stop sneezing. Used a second time, and took benadryl prior and no issues.      Lisinopril Other (See Comments)     Cough         Review of Systems    Objective:  BP 130/86 (Site: Left Upper Arm, Position: Sitting, Cuff Size: Large Adult)   Pulse 82   Ht 6' 1\" (1.854 m)   Wt 272 lb (123.4 kg)   SpO2 97%   BMI 35.89 kg/m²     BP Readings from Last 3 Encounters:   12/16/22 130/86   08/05/22 132/62   06/16/22 130/82       Pulse Readings from Last 3 Encounters:   12/16/22 82   08/05/22 59   06/16/22 82       Wt Readings from Last 3 Encounters:   12/16/22 272 lb (123.4 kg)   08/05/22 277 lb (125.6 kg)   06/16/22 276 lb (125.2 kg)       Physical Exam  Constitutional:       General: He is not in acute distress. Appearance: He is well-developed. Eyes:      General: No scleral icterus. Conjunctiva/sclera: Conjunctivae normal.   Cardiovascular:      Rate and Rhythm: Normal rate and regular rhythm. Heart sounds: Normal heart sounds. No murmur heard. No gallop. Pulmonary:      Effort: Pulmonary effort is normal. No respiratory distress. Breath sounds: Normal breath sounds. No wheezing, rhonchi or rales. Abdominal:      General: Bowel sounds are normal. There is no distension. Palpations: Abdomen is soft. Tenderness: There is no abdominal tenderness. Skin:     Findings: No erythema or rash. Neurological:      Mental Status: He is alert. Assessment/Plan:      Diagnosis Orders   1. Primary hypertension        2. Hypercholesteremia        3. Elevated liver enzymes        4. Chronic rhinitis        5. Vitamin D deficiency        6. PMR (polymyalgia rheumatica) (HCC)        7. Gilbert syndrome        8. MONISHA (obstructive sleep apnea)        9. SOB (shortness of breath)        10.  Insomnia, unspecified type          Reviewed labs  Cont stretching/ exercise program s/p KASSI - f/u pmr if shot wearing off  Sleep hygiene/ sleep apps d/w pt - melatonin for now - off belsomra  Recent cxr clear  Labs reviewed - vitamin D replacement dw/ pt - bumping from 1000 to 5000 daily for 1-2 months then qod  Celexa 20 for depression - will bump to 40/d  Bp controlled on benicar/ norvasc  Off Singulair/ on zyrtec for rhinitis w/ atrovent ns - dw/ pt using atelin otc  On crestor 10 -bump to 20 as tolerated  F/u rheum routinely - on plaquenil/ humira  F/u 3 months w/ fasting lipid,cmp  The 10-year ASCVD risk score (Salvatore CUETO, et al., 2019) is: 10.2%    Values used to calculate the score:      Age: 49 years      Sex: Male      Is Non- : Yes      Diabetic: No      Tobacco smoker: No      Systolic Blood Pressure: 324 mmHg      Is BP treated: Yes      HDL Cholesterol: 68 mg/dL      Total Cholesterol: 223 mg/dL    Justin Shearer MD, MD  12/16/2022  8:52 AM

## 2022-12-19 NOTE — TELEPHONE ENCOUNTER
CPM including low cholesterol diet, weight reduction and exercise pending labs. Target LDL <130, triglycerides <150 and HDL >40. Further evaluation, treatment, and follow-up per orders, current med list, and patient instructions.     Received DENIAL for Diethylpropion HCl ER 75MG er tablets. Denial letter attached. Please advise patient. Thank you.

## 2023-02-14 ENCOUNTER — TELEPHONE (OUTPATIENT)
Dept: FAMILY MEDICINE CLINIC | Age: 58
End: 2023-02-14

## 2023-02-14 ENCOUNTER — APPOINTMENT (OUTPATIENT)
Dept: GENERAL RADIOLOGY | Age: 58
DRG: 149 | End: 2023-02-14
Payer: COMMERCIAL

## 2023-02-14 ENCOUNTER — APPOINTMENT (OUTPATIENT)
Dept: CT IMAGING | Age: 58
DRG: 149 | End: 2023-02-14
Payer: COMMERCIAL

## 2023-02-14 ENCOUNTER — HOSPITAL ENCOUNTER (INPATIENT)
Age: 58
LOS: 2 days | Discharge: HOME | DRG: 149 | End: 2023-02-16
Attending: INTERNAL MEDICINE | Admitting: INTERNAL MEDICINE
Payer: COMMERCIAL

## 2023-02-14 DIAGNOSIS — I10 POOR HIGH BLOOD PRESSURE CONTROL: ICD-10-CM

## 2023-02-14 DIAGNOSIS — R42 DIZZINESS: Primary | ICD-10-CM

## 2023-02-14 DIAGNOSIS — R40.4 TRANSIENT ALTERATION OF AWARENESS: ICD-10-CM

## 2023-02-14 LAB
A/G RATIO: 1.6 (ref 1.1–2.2)
ALBUMIN SERPL-MCNC: 4.2 G/DL (ref 3.4–5)
ALP BLD-CCNC: 69 U/L (ref 40–129)
ALT SERPL-CCNC: 27 U/L (ref 10–40)
AMPHETAMINE SCREEN, URINE: NORMAL
ANION GAP SERPL CALCULATED.3IONS-SCNC: 9 MMOL/L (ref 3–16)
AST SERPL-CCNC: 27 U/L (ref 15–37)
BARBITURATE SCREEN URINE: NORMAL
BASOPHILS ABSOLUTE: 0 K/UL (ref 0–0.2)
BASOPHILS RELATIVE PERCENT: 1 %
BENZODIAZEPINE SCREEN, URINE: NORMAL
BILIRUB SERPL-MCNC: 1 MG/DL (ref 0–1)
BUN BLDV-MCNC: 11 MG/DL (ref 7–20)
CALCIUM SERPL-MCNC: 9.5 MG/DL (ref 8.3–10.6)
CANNABINOID SCREEN URINE: NORMAL
CHLORIDE BLD-SCNC: 102 MMOL/L (ref 99–110)
CO2: 27 MMOL/L (ref 21–32)
COCAINE METABOLITE SCREEN URINE: NORMAL
CREAT SERPL-MCNC: 1.3 MG/DL (ref 0.9–1.3)
EKG ATRIAL RATE: 69 BPM
EKG DIAGNOSIS: NORMAL
EKG P AXIS: 55 DEGREES
EKG P-R INTERVAL: 190 MS
EKG Q-T INTERVAL: 412 MS
EKG QRS DURATION: 82 MS
EKG QTC CALCULATION (BAZETT): 441 MS
EKG R AXIS: -11 DEGREES
EKG T AXIS: 47 DEGREES
EKG VENTRICULAR RATE: 69 BPM
EOSINOPHILS ABSOLUTE: 0.1 K/UL (ref 0–0.6)
EOSINOPHILS RELATIVE PERCENT: 2.3 %
FENTANYL SCREEN, URINE: NORMAL
GFR SERPL CREATININE-BSD FRML MDRD: >60 ML/MIN/{1.73_M2}
GLUCOSE BLD-MCNC: 91 MG/DL (ref 70–99)
HCT VFR BLD CALC: 42.6 % (ref 40.5–52.5)
HEMOGLOBIN: 14.2 G/DL (ref 13.5–17.5)
INR BLD: 1.02 (ref 0.87–1.14)
LYMPHOCYTES ABSOLUTE: 1.1 K/UL (ref 1–5.1)
LYMPHOCYTES RELATIVE PERCENT: 23.8 %
Lab: NORMAL
MCH RBC QN AUTO: 31.4 PG (ref 26–34)
MCHC RBC AUTO-ENTMCNC: 33.3 G/DL (ref 31–36)
MCV RBC AUTO: 94.2 FL (ref 80–100)
METHADONE SCREEN, URINE: NORMAL
MONOCYTES ABSOLUTE: 0.7 K/UL (ref 0–1.3)
MONOCYTES RELATIVE PERCENT: 15.6 %
NEUTROPHILS ABSOLUTE: 2.6 K/UL (ref 1.7–7.7)
NEUTROPHILS RELATIVE PERCENT: 57.3 %
OPIATE SCREEN URINE: NORMAL
OXYCODONE URINE: NORMAL
PDW BLD-RTO: 13.8 % (ref 12.4–15.4)
PH UA: 7
PHENCYCLIDINE SCREEN URINE: NORMAL
PLATELET # BLD: 261 K/UL (ref 135–450)
PMV BLD AUTO: 7.1 FL (ref 5–10.5)
POTASSIUM REFLEX MAGNESIUM: 4 MMOL/L (ref 3.5–5.1)
PRO-BNP: 9 PG/ML (ref 0–124)
PROTHROMBIN TIME: 13.3 SEC (ref 11.7–14.5)
RAPID INFLUENZA  B AGN: NEGATIVE
RAPID INFLUENZA A AGN: NEGATIVE
RBC # BLD: 4.52 M/UL (ref 4.2–5.9)
SARS-COV-2, NAAT: NOT DETECTED
SODIUM BLD-SCNC: 138 MMOL/L (ref 136–145)
TOTAL PROTEIN: 6.8 G/DL (ref 6.4–8.2)
TROPONIN: <0.01 NG/ML
WBC # BLD: 4.5 K/UL (ref 4–11)

## 2023-02-14 PROCEDURE — 80307 DRUG TEST PRSMV CHEM ANLYZR: CPT

## 2023-02-14 PROCEDURE — 2580000003 HC RX 258: Performed by: INTERNAL MEDICINE

## 2023-02-14 PROCEDURE — 71045 X-RAY EXAM CHEST 1 VIEW: CPT

## 2023-02-14 PROCEDURE — 85025 COMPLETE CBC W/AUTO DIFF WBC: CPT

## 2023-02-14 PROCEDURE — 83880 ASSAY OF NATRIURETIC PEPTIDE: CPT

## 2023-02-14 PROCEDURE — 96374 THER/PROPH/DIAG INJ IV PUSH: CPT

## 2023-02-14 PROCEDURE — 80053 COMPREHEN METABOLIC PANEL: CPT

## 2023-02-14 PROCEDURE — 6370000000 HC RX 637 (ALT 250 FOR IP): Performed by: GENERAL ACUTE CARE HOSPITAL

## 2023-02-14 PROCEDURE — 6360000002 HC RX W HCPCS: Performed by: GENERAL ACUTE CARE HOSPITAL

## 2023-02-14 PROCEDURE — 85610 PROTHROMBIN TIME: CPT

## 2023-02-14 PROCEDURE — 70450 CT HEAD/BRAIN W/O DYE: CPT

## 2023-02-14 PROCEDURE — 93005 ELECTROCARDIOGRAM TRACING: CPT | Performed by: GENERAL ACUTE CARE HOSPITAL

## 2023-02-14 PROCEDURE — 6370000000 HC RX 637 (ALT 250 FOR IP): Performed by: INTERNAL MEDICINE

## 2023-02-14 PROCEDURE — 36415 COLL VENOUS BLD VENIPUNCTURE: CPT

## 2023-02-14 PROCEDURE — 1200000000 HC SEMI PRIVATE

## 2023-02-14 PROCEDURE — 99285 EMERGENCY DEPT VISIT HI MDM: CPT

## 2023-02-14 PROCEDURE — 6360000004 HC RX CONTRAST MEDICATION: Performed by: GENERAL ACUTE CARE HOSPITAL

## 2023-02-14 PROCEDURE — 87804 INFLUENZA ASSAY W/OPTIC: CPT

## 2023-02-14 PROCEDURE — 6360000002 HC RX W HCPCS: Performed by: INTERNAL MEDICINE

## 2023-02-14 PROCEDURE — 96375 TX/PRO/DX INJ NEW DRUG ADDON: CPT

## 2023-02-14 PROCEDURE — 93010 ELECTROCARDIOGRAM REPORT: CPT | Performed by: INTERNAL MEDICINE

## 2023-02-14 PROCEDURE — 84484 ASSAY OF TROPONIN QUANT: CPT

## 2023-02-14 PROCEDURE — 87635 SARS-COV-2 COVID-19 AMP PRB: CPT

## 2023-02-14 PROCEDURE — 70496 CT ANGIOGRAPHY HEAD: CPT

## 2023-02-14 RX ORDER — AMLODIPINE BESYLATE 5 MG/1
10 TABLET ORAL DAILY
Status: CANCELLED | OUTPATIENT
Start: 2023-02-14

## 2023-02-14 RX ORDER — METHYLPREDNISOLONE SODIUM SUCCINATE 125 MG/2ML
125 INJECTION, POWDER, LYOPHILIZED, FOR SOLUTION INTRAMUSCULAR; INTRAVENOUS ONCE
Status: COMPLETED | OUTPATIENT
Start: 2023-02-14 | End: 2023-02-14

## 2023-02-14 RX ORDER — ONDANSETRON 2 MG/ML
4 INJECTION INTRAMUSCULAR; INTRAVENOUS EVERY 6 HOURS PRN
Status: DISCONTINUED | OUTPATIENT
Start: 2023-02-14 | End: 2023-02-16 | Stop reason: HOSPADM

## 2023-02-14 RX ORDER — SODIUM CHLORIDE 0.9 % (FLUSH) 0.9 %
10 SYRINGE (ML) INJECTION PRN
Status: DISCONTINUED | OUTPATIENT
Start: 2023-02-14 | End: 2023-02-16 | Stop reason: HOSPADM

## 2023-02-14 RX ORDER — LABETALOL HYDROCHLORIDE 5 MG/ML
10 INJECTION, SOLUTION INTRAVENOUS EVERY 4 HOURS PRN
Status: DISCONTINUED | OUTPATIENT
Start: 2023-02-14 | End: 2023-02-16 | Stop reason: HOSPADM

## 2023-02-14 RX ORDER — DIPHENHYDRAMINE HYDROCHLORIDE 50 MG/ML
50 INJECTION INTRAMUSCULAR; INTRAVENOUS ONCE
Status: COMPLETED | OUTPATIENT
Start: 2023-02-14 | End: 2023-02-14

## 2023-02-14 RX ORDER — ASPIRIN 81 MG/1
81 TABLET ORAL DAILY
Status: DISCONTINUED | OUTPATIENT
Start: 2023-02-15 | End: 2023-02-16 | Stop reason: HOSPADM

## 2023-02-14 RX ORDER — ASPIRIN 300 MG/1
300 SUPPOSITORY RECTAL DAILY
Status: DISCONTINUED | OUTPATIENT
Start: 2023-02-15 | End: 2023-02-16 | Stop reason: HOSPADM

## 2023-02-14 RX ORDER — ATORVASTATIN CALCIUM 40 MG/1
40 TABLET, FILM COATED ORAL NIGHTLY
Status: DISCONTINUED | OUTPATIENT
Start: 2023-02-14 | End: 2023-02-16 | Stop reason: HOSPADM

## 2023-02-14 RX ORDER — ONDANSETRON 4 MG/1
4 TABLET, ORALLY DISINTEGRATING ORAL EVERY 8 HOURS PRN
Status: DISCONTINUED | OUTPATIENT
Start: 2023-02-14 | End: 2023-02-16 | Stop reason: HOSPADM

## 2023-02-14 RX ORDER — SODIUM CHLORIDE 9 MG/ML
INJECTION, SOLUTION INTRAVENOUS PRN
Status: DISCONTINUED | OUTPATIENT
Start: 2023-02-14 | End: 2023-02-16 | Stop reason: HOSPADM

## 2023-02-14 RX ORDER — ENOXAPARIN SODIUM 100 MG/ML
30 INJECTION SUBCUTANEOUS 2 TIMES DAILY
Status: DISCONTINUED | OUTPATIENT
Start: 2023-02-14 | End: 2023-02-16 | Stop reason: HOSPADM

## 2023-02-14 RX ORDER — ONDANSETRON 2 MG/ML
4 INJECTION INTRAMUSCULAR; INTRAVENOUS ONCE
Status: COMPLETED | OUTPATIENT
Start: 2023-02-14 | End: 2023-02-14

## 2023-02-14 RX ORDER — IPRATROPIUM BROMIDE 42 UG/1
1 SPRAY, METERED NASAL EVERY 6 HOURS PRN
Status: DISCONTINUED | OUTPATIENT
Start: 2023-02-14 | End: 2023-02-16 | Stop reason: HOSPADM

## 2023-02-14 RX ORDER — CITALOPRAM 20 MG/1
40 TABLET ORAL DAILY
Status: DISCONTINUED | OUTPATIENT
Start: 2023-02-15 | End: 2023-02-16 | Stop reason: HOSPADM

## 2023-02-14 RX ORDER — SODIUM CHLORIDE 0.9 % (FLUSH) 0.9 %
10 SYRINGE (ML) INJECTION EVERY 12 HOURS SCHEDULED
Status: DISCONTINUED | OUTPATIENT
Start: 2023-02-14 | End: 2023-02-16 | Stop reason: HOSPADM

## 2023-02-14 RX ORDER — NIFEDIPINE 30 MG/1
60 TABLET, EXTENDED RELEASE ORAL DAILY
Status: DISCONTINUED | OUTPATIENT
Start: 2023-02-14 | End: 2023-02-16 | Stop reason: HOSPADM

## 2023-02-14 RX ORDER — HYDROXYCHLOROQUINE SULFATE 200 MG/1
200 TABLET, FILM COATED ORAL 2 TIMES DAILY
Status: DISCONTINUED | OUTPATIENT
Start: 2023-02-14 | End: 2023-02-16 | Stop reason: HOSPADM

## 2023-02-14 RX ORDER — ASPIRIN 325 MG
325 TABLET ORAL ONCE
Status: COMPLETED | OUTPATIENT
Start: 2023-02-14 | End: 2023-02-14

## 2023-02-14 RX ADMIN — HYDROXYCHLOROQUINE SULFATE 200 MG: 200 TABLET ORAL at 23:33

## 2023-02-14 RX ADMIN — ASPIRIN 325 MG ORAL TABLET 325 MG: 325 PILL ORAL at 18:00

## 2023-02-14 RX ADMIN — DIPHENHYDRAMINE HYDROCHLORIDE 50 MG: 50 INJECTION, SOLUTION INTRAMUSCULAR; INTRAVENOUS at 12:41

## 2023-02-14 RX ADMIN — NIFEDIPINE 60 MG: 30 TABLET, EXTENDED RELEASE ORAL at 23:33

## 2023-02-14 RX ADMIN — ENOXAPARIN SODIUM 30 MG: 100 INJECTION SUBCUTANEOUS at 23:33

## 2023-02-14 RX ADMIN — IOPAMIDOL 75 ML: 755 INJECTION, SOLUTION INTRAVENOUS at 12:56

## 2023-02-14 RX ADMIN — METHYLPREDNISOLONE SODIUM SUCCINATE 125 MG: 125 INJECTION, POWDER, FOR SOLUTION INTRAMUSCULAR; INTRAVENOUS at 12:41

## 2023-02-14 RX ADMIN — Medication 10 ML: at 23:34

## 2023-02-14 RX ADMIN — ATORVASTATIN CALCIUM 40 MG: 40 TABLET, FILM COATED ORAL at 23:33

## 2023-02-14 RX ADMIN — ONDANSETRON 4 MG: 2 INJECTION INTRAMUSCULAR; INTRAVENOUS at 13:10

## 2023-02-14 ASSESSMENT — ENCOUNTER SYMPTOMS
SHORTNESS OF BREATH: 0
SORE THROAT: 0
CHEST TIGHTNESS: 0
COUGH: 0
VOMITING: 1
BACK PAIN: 0
ABDOMINAL PAIN: 0
WHEEZING: 0
NAUSEA: 1
VOICE CHANGE: 0

## 2023-02-14 ASSESSMENT — LIFESTYLE VARIABLES: HOW OFTEN DO YOU HAVE A DRINK CONTAINING ALCOHOL: MONTHLY OR LESS

## 2023-02-14 NOTE — ED NOTES
Patient pre medicated prior to CT. Called out stating he was nauseous. Verbal order for 4mg zofran IV from [facility] NP.       Akira Enrique RN  02/14/23 [id number]

## 2023-02-14 NOTE — PROGRESS NOTES
Medication Reconciliation    List of medications patient is currently taking is complete. Source of information: 1. Conversation with patient at bedside                                       2. EPIC records      Allergies  Dye  [barium-containing compounds], Iodine, and Lisinopril     Notes regarding home medications:   1. Patient received all of his morning home medications prior to arrival to the emergency department today.     Sree Maciel, [address] [facility], PharmVAL, BCPS  2/14/2023 3:14 PM

## 2023-02-14 NOTE — ED NOTES
Patient given crackers doug mist and ice at this time  chun NP verbal ok     Oral Leon, SHEBA  02/14/23 1030 [facility], RN  02/14/23 1182

## 2023-02-14 NOTE — ED PROVIDER NOTES
[address] [facility]        Pt Name: Francesco Edmondson  MRN: [id number]  [place] 1965  Date of evaluation: 2/14/2023  Provider: JORGE ALBERTO Hdez - LUANN  PCP: Ember Iverson MD  Note Started: 5:14 PM EST 2/14/23      SANDEEP. I have evaluated this patient. My supervising physician was available for consultation. CHIEF COMPLAINT       Chief Complaint   Patient presents with    Dizziness     Pt arrives with c/o dizziness that started yesterday around 1630. Pt states he was laying down and the room was spinning and he was unable to get up. He also states he experienced confusion and had one episode of vomiting. Pt states he is still experiencing some dizziness and nausea today. Pt states he had an episode of orthostatic syncope in July       HISTORY OF PRESENT ILLNESS: 1 or more Elements     History From: Patient      Francesco Edmondson is a 56 y.o. male who presents to the emergency department today for evaluation of dizziness. Patient states that yesterday at approximately 6:30 PM he was lying down and felt as if the room was spinning. Patient states that he was so dizzy he was unable to get up. Patient states that during that same episode he experienced some confusion and vomited once. He denies history of CVAs or TIAs. Nursing Notes were all reviewed and agreed with or any disagreements were addressed in the HPI. REVIEW OF SYSTEMS :      Review of Systems   Constitutional:  Negative for chills, fatigue and fever. HENT:  Negative for congestion, sore throat and voice change. Eyes:  Negative for visual disturbance. Respiratory:  Negative for cough, chest tightness, shortness of breath and wheezing. Cardiovascular:  Negative for chest pain and palpitations. Gastrointestinal:  Positive for nausea and vomiting. Negative for abdominal pain. Endocrine: Negative for polydipsia and polyuria.    Genitourinary:  Negative for difficulty urinating, dysuria and flank pain. Musculoskeletal:  Negative for back pain, neck pain and neck stiffness. Skin:  Negative for rash and wound. Allergic/Immunologic: Negative for immunocompromised state. Neurological:  Positive for dizziness, weakness, light-headedness and headaches. Negative for tremors, seizures, syncope, facial asymmetry, speech difficulty and numbness. Hematological:  Does not bruise/bleed easily. Psychiatric/Behavioral:  Negative for sleep disturbance and suicidal ideas. Positives and Pertinent negatives as per HPI. SURGICAL HISTORY     Past Surgical History:   Procedure Laterality Date    CHEST WALL RESECTION Left 1/21/2022    REMOVAL OF 4 CENTIMETER LIPOMA: LEFT UPPER CHEST performed by Alma Agosto MD at [facility], LAPAROSCOPIC  8-11-11    COLONOSCOPY  06/21/2018    COLONOSCOPY, DR CARTER GARCIA, COLON POLYPS X2, RECTAL POLYP X1.     KNEE ARTHROSCOPY Bilateral     KNEE SURGERY Right 12/07/2020    meniscus repair    NASAL SEPTUM SURGERY      and palatoplasty       CURRENTMEDICATIONS       Previous Medications    AMLODIPINE (NORVASC) 10 MG TABLET    TAKE 1 TABLET BY MOUTH DAILY    ASPIRIN (ASPIRIN LOW DOSE) 81 MG TABLET    Take 1 tablet by mouth daily    CETIRIZINE (ZYRTEC) 10 MG TABLET    Take 1 tablet by mouth daily    CHOLECALCIFEROL (VITAMIN D) 2000 UNITS TABS TABLET    Take 1 tablet by mouth daily After finishing 12 week couse    CITALOPRAM (CELEXA) 40 MG TABLET    Take 1 tablet by mouth daily    HYDROXYCHLOROQUINE (PLAQUENIL) 200 MG TABLET    Take 200 mg by mouth 2 times daily    IPRATROPIUM (ATROVENT) 0.06 % NASAL SPRAY    1-2 sprays by Each Nostril route every 6 hours as needed for Rhinitis    OLMESARTAN (BENICAR) 40 MG TABLET    TAKE 1 TABLET BY MOUTH DAILY    ONDANSETRON (ZOFRAN ODT) 8 MG TBDP DISINTEGRATING TABLET    Take 1 tablet by mouth every 8 hours as needed for Nausea    ROSUVASTATIN (CRESTOR) 20 MG TABLET Take 1 tablet by mouth daily    SEMAGLUTIDE-WEIGHT MANAGEMENT (WEGOVY) 2.4 MG/0.75ML SOAJ SC INJECTION    INJECT 2.4MG INTO THE SKIN EVERY 7 DAYS       ALLERGIES     Dye  [barium-containing compounds], Iodine, and Lisinopril    FAMILYHISTORY       Family History   Problem Relation Age of Onset    High Blood Pressure Mother     Cancer Father         testicular, multiple myeloma    Heart Attack Father 47        CABG 4V    Other Father         MRSA    No Known Problems Brother     Early Death Maternal Uncle         SOCIAL HISTORY       Social History     Tobacco Use    Smoking status: Never    Smokeless tobacco: Never   Vaping Use    Vaping Use: Never used   Substance Use Topics    Alcohol use: Yes     Comment: rare    Drug use: No       SCREENINGS   NIH Stroke Scale  Interval: Baseline  Level of Consciousness (1a): Alert  LOC Questions (1b): Answers both correctly  LOC Commands (1c): Performs both tasks correctly  Best Gaze (2): Normal  Visual (3): No visual loss  Facial Palsy (4): Normal symmetrical movement  Motor Arm, Left (5a): No drift  Motor Arm, Right (5b): No drift  Motor Leg, Left (6a): No drift  Motor Leg, Right (6b): No drift  Limb Ataxia (7): Absent  Sensory (8): Normal  Best Language (9): No aphasia  Dysarthria (10): Normal  Extinction and Inattention (11): No abnormality  Total: 0    Roman Coma Scale  Eye Opening: Spontaneous  Best Verbal Response: Oriented  Best Motor Response: Obeys commands  Roman Coma Scale Score: 15                CIWA Assessment  BP: (!) 149/86  Heart Rate: 84           PHYSICAL EXAM  1 or more Elements     ED Triage Vitals [02/14/23 0946]   BP Temp Temp Source Heart Rate Resp SpO2 Height Weight   (!) 144/99 97.1 °F (36.2 °C) Oral 80 16 94 % 6' 1\" (1.854 m) 270 lb 11.6 oz (122.8 kg)       Physical Exam  Vitals and nursing note reviewed. Constitutional:       General: He is not in acute distress. Appearance: Normal appearance. He is not ill-appearing.    HENT:      Head: Normocephalic and atraumatic. Right Ear: Tympanic membrane, ear canal and external ear normal.      Left Ear: Tympanic membrane, ear canal and external ear normal.      Nose: Nose normal.      Mouth/Throat:      Mouth: Mucous membranes are moist.      Pharynx: Oropharynx is clear. Eyes:      General:         Right eye: No discharge. Left eye: No discharge. Extraocular Movements: Extraocular movements intact. Conjunctiva/sclera: Conjunctivae normal.      Pupils: Pupils are equal, round, and reactive to light. Cardiovascular:      Rate and Rhythm: Normal rate and regular rhythm. Pulses: Normal pulses. Heart sounds: Normal heart sounds. Pulmonary:      Effort: Pulmonary effort is normal. No respiratory distress. Breath sounds: Normal breath sounds. Abdominal:      General: Bowel sounds are normal.      Palpations: Abdomen is soft. Tenderness: There is no abdominal tenderness. There is no right CVA tenderness or left CVA tenderness. Musculoskeletal:         General: Normal range of motion. Cervical back: Normal range of motion and neck supple. No rigidity or tenderness. Right lower leg: No edema. Left lower leg: No edema. Skin:     General: Skin is warm and dry. Capillary Refill: Capillary refill takes less than 2 seconds. Neurological:      General: No focal deficit present. Mental Status: He is alert and oriented to person, place, and time. GCS: GCS eye subscore is 4. GCS verbal subscore is 5. GCS motor subscore is 6. Cranial Nerves: Cranial nerves 2-12 are intact. Sensory: Sensation is intact. Motor: Motor function is intact. Coordination: Coordination is intact. Gait: Gait is intact. Deep Tendon Reflexes: Reflexes are normal and symmetric. Psychiatric:         Mood and Affect: Mood normal.         Behavior: Behavior normal.         Thought Content:  Thought content normal.         Judgment: Judgment normal.           DIAGNOSTIC RESULTS   LABS:    Labs Reviewed   CBC WITH AUTO DIFFERENTIAL   COMPREHENSIVE METABOLIC PANEL W/ REFLEX TO MG FOR LOW K   TROPONIN   PROTIME-INR   URINE DRUG SCREEN   BRAIN NATRIURETIC PEPTIDE   POCT GLUCOSE       When ordered only abnormal lab results are displayed. All other labs were within normal range or not returned as of this dictation. EKG: When ordered, EKG's are interpreted by the Emergency Department Physician in the absence of a cardiologist.  Please see their note for interpretation of EKG. RADIOLOGY:   Non-plain film images such as CT, Ultrasound and MRI are read by the radiologist. Plain radiographic images are visualized and preliminarily interpreted by the ED Provider with the below findings:        Interpretation per the Radiologist below, if available at the time of this note:    CT HEAD WO CONTRAST   Final Result   No acute intracranial abnormality. CTA HEAD NECK W CONTRAST   Final Result   No acute abnormality or flow limiting stenosis of the major arteries of the   head and neck. XR CHEST PORTABLE   Final Result   No significant findings in the chest.           CT HEAD WO CONTRAST    Result Date: 2/14/2023  EXAMINATION: CT OF THE HEAD WITHOUT CONTRAST  2/14/2023 12:57 pm TECHNIQUE: CT of the head was performed without the administration of intravenous contrast. Automated exposure control, iterative reconstruction, and/or weight based adjustment of the mA/kV was utilized to reduce the radiation dose to as low as reasonably achievable. COMPARISON: None. HISTORY: ORDERING SYSTEM PROVIDED HISTORY: Stroke Symptoms TECHNOLOGIST PROVIDED HISTORY: Has a \"code stroke\" or \"stroke alert\" been called? ->No Reason for exam:->Stroke Symptoms Decision Support Exception - unselect if not a suspected or confirmed emergency medical condition->Emergency Medical Condition (MA) FINDINGS: BRAIN/VENTRICLES: The ventricles are normal in size.  The sulci are normally formed over the convexities bilaterally. No extra-axial fluid collections. No sign of recent intracranial hemorrhage. Brain parenchymal attenuation is normal. No mass lesions on this noncontrast study. ORBITS: The visualized portion of the orbits demonstrate no acute abnormality. SINUSES: The visualized paranasal sinuses and mastoid air cells demonstrate no acute abnormality. Left maxillary mucous retention cyst. SOFT TISSUES/SKULL:  No acute abnormality of the visualized skull or soft tissues. No acute intracranial abnormality. XR CHEST PORTABLE    Result Date: 2/14/2023  EXAMINATION: ONE XRAY VIEW OF THE CHEST 2/14/2023 10:50 am COMPARISON: 12/14/2022 radiograph HISTORY: ORDERING SYSTEM PROVIDED HISTORY: stroke symptoms TECHNOLOGIST PROVIDED HISTORY: Reason for exam:->stroke symptoms Reason for Exam: stroke symptoms FINDINGS: The heart, mediastinum and pulmonary vascularity are normal.  Lungs are well-expanded and clear. No skeletal abnormalities are present in the chest.     No significant findings in the chest.     CTA HEAD NECK W CONTRAST    Result Date: 2/14/2023  EXAMINATION: CTA OF THE HEAD AND NECK WITH CONTRAST 2/14/2023 12:57 pm: TECHNIQUE: CTA of the head and neck was performed with the administration of intravenous contrast. Multiplanar reformatted images are provided for review. MIP images are provided for review. Stenosis of the internal carotid arteries measured using NASCET criteria. Automated exposure control, iterative reconstruction, and/or weight based adjustment of the mA/kV was utilized to reduce the radiation dose to as low as reasonably achievable. COMPARISON: Noncontrast CT head from earlier today HISTORY: ORDERING SYSTEM PROVIDED HISTORY: Stroke Symptoms TECHNOLOGIST PROVIDED HISTORY: Has a \"code stroke\" or \"stroke alert\" been called? ->No Reason for exam:->Stroke Symptoms Decision Support Exception - unselect if not a suspected or confirmed emergency medical condition->Emergency Medical Condition (MA) FINDINGS: CTA NECK: AORTIC ARCH/ARCH VESSELS: No dissection or arterial injury. No significant stenosis of the brachiocephalic or subclavian arteries. CAROTID ARTERIES: No dissection, arterial injury, or hemodynamically significant stenosis by NASCET criteria. VERTEBRAL ARTERIES: No dissection, arterial injury, or significant stenosis. SOFT TISSUES: The lung apices are clear. There are calcified mediastinal lymph nodes, likely related to prior granulomatous disease. No cervical or superior mediastinal lymphadenopathy. The larynx and pharynx are unremarkable. No acute abnormality of the salivary and thyroid glands. BONES: No acute osseous abnormality. CTA HEAD: ANTERIOR CIRCULATION: No significant stenosis of the intracranial internal carotid, anterior cerebral, or middle cerebral arteries. No aneurysm. POSTERIOR CIRCULATION: There is fetal origin of the left PCA, normal variant. No significant stenosis of the vertebral, basilar, or posterior cerebral arteries. No aneurysm. OTHER: No dural venous sinus thrombosis on this non-dedicated study. BRAIN: No mass effect or midline shift. No extra-axial fluid collection. The gray-white differentiation is maintained. No acute abnormality or flow limiting stenosis of the major arteries of the head and neck. No results found. PROCEDURES   Unless otherwise noted below, none     Procedures    CRITICAL CARE TIME (.cctime)   There was a high probability of life-threatening clinical deterioration in the patient's condition requiring my urgent intervention. I personally saw the patient and independently provided 21 minutes of non-concurrent critical care out of the total shared critical care time provided, excluding separately reportable procedures.          PAST MEDICAL HISTORY      has a past medical history of Arthritis, Asthma, Colon polyps (06/21/2018), Gastritis (06/21/2018), GERD (gastroesophageal reflux disease), Hyperlipidemia, Hyperplastic rectal polyp (06/21/2018), Hypertension, Increased intraocular pressure, Migraine, Pneumonia, Sleep apnea, and Testosterone deficiency (4/6/2017). EMERGENCY DEPARTMENT COURSE and DIFFERENTIAL DIAGNOSIS/MDM:   Vitals:    Vitals:    02/14/23 1530 02/14/23 1600 02/14/23 1630 02/14/23 1700   BP: (!) 168/104 (!) 171/107 (!) 147/90 (!) 149/86   Pulse: 75 80 80 84   Resp: 20 23 14 23   Temp:       TempSrc:       SpO2: 98% 96% 97% 97%   Weight:       Height:           Patient was given the following medications:  Medications   aspirin tablet 325 mg (has no administration in time range)   diphenhydrAMINE (BENADRYL) injection 50 mg (50 mg IntraVENous Given 2/14/23 1241)   methylPREDNISolone sodium (SOLU-MEDROL) injection 125 mg (125 mg IntraVENous Given 2/14/23 1241)   iopamidol (ISOVUE-370) 76 % injection 75 mL (75 mLs IntraVENous Given 2/14/23 1256)   ondansetron (ZOFRAN) injection 4 mg (4 mg IntraVENous Given 2/14/23 1310)             Is this patient to be included in the SEP-1 Core Measure due to severe sepsis or septic shock? No   Exclusion criteria - the patient is NOT to be included for SEP-1 Core Measure due to: Infection is not suspected    Chronic Conditions affecting care:    has a past medical history of Arthritis, Asthma, Colon polyps (06/21/2018), Gastritis (06/21/2018), GERD (gastroesophageal reflux disease), Hyperlipidemia, Hyperplastic rectal polyp (06/21/2018), Hypertension, Increased intraocular pressure, Migraine, Pneumonia, Sleep apnea, and Testosterone deficiency (4/6/2017). CONSULTS: (Who and What was discussed)  IP CONSULT TO [address] [place] [address] CONSULT TO HOSPITALIST  IP CONSULT TO NEUROLOGY  IP CONSULT TO CASE MANAGEMENT          Records Reviewed (External and Source) previous records reviewed in order to gain further information regarding patient's PMH as well as his HPI. Nursing notes reviewed.     CC/HPI Summary, DDx, ED Course, and Reassessment: This is a 61-year-old -American male with history of multiple comorbid's including hyperlipidemia, hypertension, obesity, and asthma who presents to the emergency department today reporting an episode of dizziness, confusion, and vomiting. Patient states that this episode took place yesterday around 4 PM.  He has never had anything like this in the past.  There is no history of CVAs or TIAs. Patient states that in July he was diagnosed with orthostatic hypotension after a near syncopal episode. Patient states that he happened to be laying down when the symptoms started yesterday. He denies recent head injury or trauma. He does report mild headache which he rates a 2 out of 10. He describes his pain as constant, dull, and aching. Pain is nonmigratory. There are no aggravating or alleviating factors. Patient denies having any neck pain or stiffness. Patient states that he \"sometimes sees floaters\". Patient states that this has been an ongoing issue and he sees [employer] for this. Patient denies chest pain. He denies shortness of breath. He denies any abdominal pain. Patient reports continued nausea and dizziness but states that he does feel better than he did yesterday. Physical exam complete. Patient arrives nontoxic, afebrile, hypertensive. Patient states that he is compliant with his prescribed medications. GCS is 15. Patient is without any focal neurologic deficits. Stroke work-up initiated. Differential diagnoses include but not limited to CVA, TIA, ACS, MI, complex migraine, intracranial hemorrhage, large vessel occlusion, vertigo, cardiac arrhythmia      An EKG is interpreted by ED physician and reviewed by myself and is negative for acute ST elevation. Chest x-ray interpreted by radiologist and reviewed by myself is negative for pneumonia, CHF, pneumothorax, or cardiomegaly.   CT head/CTA head and neck interpreted by radiologist as negative for intracranial hemorrhage or large vessel occlusion. Laboratory studies show a normal potassium of 4.0. No electrolyte derangement noted. H&H is stable at 14.2 and 42.6 respectively. White count is normal at 4.5. Remaining laboratory studies have been unremarkable. Patient given full dose aspirin. Patient reassessed. Patient has remained stable but mildly hypertensive throughout ED visit. At this time there is no evidence of any life-threatening or emergent conditions requiring immediate intervention. Given patient's history and ED work-up I do feel he will benefit from admission for further evaluation and treatment including MRI. Shared decision making employed and patient and family are in agreement with this plan of care. Patient admitted under hospitalist service. Patient is a full code at the time of this admission. I am the Primary Clinician of Record. FINAL IMPRESSION      1. Dizziness    2. Transient alteration of awareness    3. Poor high blood pressure control          DISPOSITION/PLAN     DISPOSITION Admitted 02/14/2023 04:13:25 PM      PATIENT REFERRED TO:  No follow-up provider specified.     DISCHARGE MEDICATIONS:  New Prescriptions    No medications on file       DISCONTINUED MEDICATIONS:  Discontinued Medications    BLOOD GLUCOSE TEST STRIPS (FREESTYLE LITE) STRIP    1 each by In Vitro route 2 times daily Indications: Impaired Glucose Tolerance Routinely due to elevated blood sugars              (Please note that portions of this note were completed with a voice recognition program.  Efforts were made to edit the dictations but occasionally words are mis-transcribed.)    JORGE ALBERTO Damico CNP (electronically signed)        JORGE ALBERTO Damico CNP  02/14/23 4511

## 2023-02-14 NOTE — ED PROVIDER NOTES
I did not perform an evaluation of Page Barreto but was asked to review his EKG. EKG interpreted by myself.    Rate: 69  Rhythm: NSR  Axis: normal  Intervals: within normal limits  ST Segments: no acute abnormality  T waves: no acute abnormality  Comparison: Compared to 3/19/21, there is no significant change  Impression: NSR with no acute abnormality        Cb Cassidy MD  02/14/23 4732

## 2023-02-14 NOTE — TELEPHONE ENCOUNTER
Px called. Sx on 2/14/23 include dizziness, confusion, vomiting, difficulty communicating with family. BP self reported as 160/100 during Sx. Px was advised to be seen in the ED per [facility].   AM

## 2023-02-15 ENCOUNTER — APPOINTMENT (OUTPATIENT)
Dept: MRI IMAGING | Age: 58
DRG: 149 | End: 2023-02-15
Payer: COMMERCIAL

## 2023-02-15 LAB
ANION GAP SERPL CALCULATED.3IONS-SCNC: 12 MMOL/L (ref 3–16)
BUN BLDV-MCNC: 19 MG/DL (ref 7–20)
CALCIUM SERPL-MCNC: 9.5 MG/DL (ref 8.3–10.6)
CHLORIDE BLD-SCNC: 103 MMOL/L (ref 99–110)
CHOLESTEROL, TOTAL: 171 MG/DL (ref 0–199)
CO2: 24 MMOL/L (ref 21–32)
CREAT SERPL-MCNC: 1.5 MG/DL (ref 0.9–1.3)
ESTIMATED AVERAGE GLUCOSE: 99.7 MG/DL
GFR SERPL CREATININE-BSD FRML MDRD: 55 ML/MIN/{1.73_M2}
GLUCOSE BLD-MCNC: 130 MG/DL (ref 70–99)
HBA1C MFR BLD: 5.1 %
HCT VFR BLD CALC: 39.6 % (ref 40.5–52.5)
HDLC SERPL-MCNC: 61 MG/DL (ref 40–60)
HEMOGLOBIN: 14.1 G/DL (ref 13.5–17.5)
INR BLD: 1.09 (ref 0.87–1.14)
LDL CHOLESTEROL CALCULATED: 99 MG/DL
LV EF: 58 %
LVEF MODALITY: NORMAL
MCH RBC QN AUTO: 32.6 PG (ref 26–34)
MCHC RBC AUTO-ENTMCNC: 35.5 G/DL (ref 31–36)
MCV RBC AUTO: 92 FL (ref 80–100)
PDW BLD-RTO: 13.8 % (ref 12.4–15.4)
PLATELET # BLD: 276 K/UL (ref 135–450)
PMV BLD AUTO: 7.3 FL (ref 5–10.5)
POTASSIUM REFLEX MAGNESIUM: 4.1 MMOL/L (ref 3.5–5.1)
PROTHROMBIN TIME: 14 SEC (ref 11.7–14.5)
RBC # BLD: 4.31 M/UL (ref 4.2–5.9)
SODIUM BLD-SCNC: 139 MMOL/L (ref 136–145)
TRIGL SERPL-MCNC: 53 MG/DL (ref 0–150)
VLDLC SERPL CALC-MCNC: 11 MG/DL
WBC # BLD: 11.1 K/UL (ref 4–11)

## 2023-02-15 PROCEDURE — 2580000003 HC RX 258: Performed by: INTERNAL MEDICINE

## 2023-02-15 PROCEDURE — 6360000002 HC RX W HCPCS: Performed by: INTERNAL MEDICINE

## 2023-02-15 PROCEDURE — 83036 HEMOGLOBIN GLYCOSYLATED A1C: CPT

## 2023-02-15 PROCEDURE — 85027 COMPLETE CBC AUTOMATED: CPT

## 2023-02-15 PROCEDURE — 6370000000 HC RX 637 (ALT 250 FOR IP): Performed by: NURSE PRACTITIONER

## 2023-02-15 PROCEDURE — C8929 TTE W OR WO FOL WCON,DOPPLER: HCPCS

## 2023-02-15 PROCEDURE — 2580000003 HC RX 258: Performed by: NURSE PRACTITIONER

## 2023-02-15 PROCEDURE — 70551 MRI BRAIN STEM W/O DYE: CPT

## 2023-02-15 PROCEDURE — 94760 N-INVAS EAR/PLS OXIMETRY 1: CPT

## 2023-02-15 PROCEDURE — 92523 SPEECH SOUND LANG COMPREHEN: CPT

## 2023-02-15 PROCEDURE — 36415 COLL VENOUS BLD VENIPUNCTURE: CPT

## 2023-02-15 PROCEDURE — 1200000000 HC SEMI PRIVATE

## 2023-02-15 PROCEDURE — 6370000000 HC RX 637 (ALT 250 FOR IP): Performed by: INTERNAL MEDICINE

## 2023-02-15 PROCEDURE — 80048 BASIC METABOLIC PNL TOTAL CA: CPT

## 2023-02-15 PROCEDURE — 97165 OT EVAL LOW COMPLEX 30 MIN: CPT

## 2023-02-15 PROCEDURE — 85610 PROTHROMBIN TIME: CPT

## 2023-02-15 PROCEDURE — 97161 PT EVAL LOW COMPLEX 20 MIN: CPT

## 2023-02-15 PROCEDURE — 80061 LIPID PANEL: CPT

## 2023-02-15 RX ORDER — SODIUM CHLORIDE 9 MG/ML
INJECTION, SOLUTION INTRAVENOUS CONTINUOUS
Status: ACTIVE | OUTPATIENT
Start: 2023-02-15 | End: 2023-02-16

## 2023-02-15 RX ORDER — ACETAMINOPHEN 325 MG/1
650 TABLET ORAL EVERY 4 HOURS PRN
Status: DISCONTINUED | OUTPATIENT
Start: 2023-02-15 | End: 2023-02-16 | Stop reason: HOSPADM

## 2023-02-15 RX ADMIN — ACETAMINOPHEN 650 MG: 325 TABLET ORAL at 03:00

## 2023-02-15 RX ADMIN — SODIUM CHLORIDE: 9 INJECTION, SOLUTION INTRAVENOUS at 10:48

## 2023-02-15 RX ADMIN — NIFEDIPINE 60 MG: 30 TABLET, EXTENDED RELEASE ORAL at 08:03

## 2023-02-15 RX ADMIN — Medication 10 ML: at 08:04

## 2023-02-15 RX ADMIN — ENOXAPARIN SODIUM 30 MG: 100 INJECTION SUBCUTANEOUS at 08:03

## 2023-02-15 RX ADMIN — ENOXAPARIN SODIUM 30 MG: 100 INJECTION SUBCUTANEOUS at 20:50

## 2023-02-15 RX ADMIN — HYDROXYCHLOROQUINE SULFATE 200 MG: 200 TABLET ORAL at 08:03

## 2023-02-15 RX ADMIN — ONDANSETRON 4 MG: 2 INJECTION INTRAMUSCULAR; INTRAVENOUS at 09:18

## 2023-02-15 RX ADMIN — HYDROXYCHLOROQUINE SULFATE 200 MG: 200 TABLET ORAL at 20:50

## 2023-02-15 RX ADMIN — CITALOPRAM HYDROBROMIDE 40 MG: 20 TABLET ORAL at 08:03

## 2023-02-15 RX ADMIN — ATORVASTATIN CALCIUM 40 MG: 40 TABLET, FILM COATED ORAL at 20:50

## 2023-02-15 RX ADMIN — SODIUM CHLORIDE: 9 INJECTION, SOLUTION INTRAVENOUS at 14:10

## 2023-02-15 RX ADMIN — ACETAMINOPHEN 650 MG: 325 TABLET ORAL at 14:07

## 2023-02-15 RX ADMIN — Medication 10 ML: at 20:51

## 2023-02-15 RX ADMIN — ASPIRIN 81 MG: 81 TABLET, COATED ORAL at 08:03

## 2023-02-15 ASSESSMENT — PAIN SCALES - GENERAL
PAINLEVEL_OUTOF10: 4
PAINLEVEL_OUTOF10: 5

## 2023-02-15 ASSESSMENT — PAIN DESCRIPTION - ORIENTATION
ORIENTATION: MID
ORIENTATION: INNER

## 2023-02-15 ASSESSMENT — PAIN DESCRIPTION - LOCATION
LOCATION: HEAD
LOCATION: HEAD

## 2023-02-15 ASSESSMENT — PAIN DESCRIPTION - DESCRIPTORS
DESCRIPTORS: ACHING
DESCRIPTORS: ACHING

## 2023-02-15 ASSESSMENT — PAIN - FUNCTIONAL ASSESSMENT: PAIN_FUNCTIONAL_ASSESSMENT: ACTIVITIES ARE NOT PREVENTED

## 2023-02-15 NOTE — PLAN OF CARE
Problem: Discharge Planning  Goal: Discharge to home or other facility with appropriate resources  2/15/2023 0112 by Piero Burks RN  Outcome: Progressing  2/14/2023 2352 by Piero Burks RN  Outcome: Progressing  Flowsheets  Taken 2/14/2023 2344  Discharge to home or other facility with appropriate resources: Identify barriers to discharge with patient and caregiver  Taken 2/14/2023 2316  Discharge to home or other facility with appropriate resources: Identify barriers to discharge with patient and caregiver     Problem: Safety - Adult  Goal: Free from fall injury  2/15/2023 0112 by Piero Burks RN  Outcome: Progressing  2/14/2023 2352 by Piero Burks RN  Outcome: Progressing     Problem: Neurosensory - Adult  Goal: Achieves stable or improved neurological status  Outcome: Progressing  Goal: Absence of seizures  Outcome: Progressing  Goal: Remains free of injury related to seizures activity  Outcome: Progressing  Goal: Achieves maximal functionality and self care  Outcome: Progressing     Problem: Respiratory - Adult  Goal: Achieves optimal ventilation and oxygenation  Outcome: Progressing     Problem: Cardiovascular - Adult  Goal: Maintains optimal cardiac output and hemodynamic stability  Outcome: Progressing  Goal: Absence of cardiac dysrhythmias or at baseline  Outcome: Progressing     Problem: Skin/Tissue Integrity - Adult  Goal: Incisions, wounds, or drain sites healing without S/S of infection  Outcome: Progressing  Goal: Oral mucous membranes remain intact  Outcome: Progressing     Problem: Musculoskeletal - Adult  Goal: Return mobility to safest level of function  Outcome: Progressing  Goal: Maintain proper alignment of affected body part  Outcome: Progressing  Goal: Return ADL status to a safe level of function  Outcome: Progressing     Problem: Gastrointestinal - Adult  Goal: Minimal or absence of nausea and vomiting  Outcome: Progressing  Goal: Maintains or returns to baseline bowel function  Outcome: Progressing  Goal: Maintains adequate nutritional intake  Outcome: Progressing     Problem: Genitourinary - Adult  Goal: Absence of urinary retention  Outcome: Progressing     Problem: Metabolic/Fluid and Electrolytes - Adult  Goal: Electrolytes maintained within normal limits  Outcome: Progressing  Goal: Hemodynamic stability and optimal renal function maintained  Outcome: Progressing  Goal: Glucose maintained within prescribed range  Outcome: Progressing     Problem: Hematologic - Adult  Goal: Maintains hematologic stability  Outcome: Progressing

## 2023-02-15 NOTE — PROGRESS NOTES
NAME:  Amber Herrera  YOB: 1965  MEDICAL RECORD NUMBER:  [id number]  TODAYS DATE:  2/15/2023    Discussed personal risk factors for Stroke/TIA with patient/family, and ways to reduce the risk for a recurrent stroke. Patient's personal risk factors which were identified are:     [x] Alcohol Abuse: check with your physician before any alcohol consumption. [x] Atrial fibrillation: may cause blood clots. [x] Drug Abuse: Seek help, talk with your doctor  [x] Clotting Disorder  [] Diabetes  [x] Family history of stroke or heart disease  [x] High Blood Pressure/Hypertension: work with your physician. [x] High cholesterol: monitor cholesterol levels with your physician. [x] Overweight/Obesity: work with your physician for your ideal body weight. [x] Physical Inactivity: get regular exercise as directed by your physician. [x] Personal history of previous TIA or stroke  [x] Poor Diet; decrease salt (sodium) in your diet, follow diet directed by physician. [] Smoking: Cigarette/Cigar: stop smoking. Advised pt. that you can reduce your risk for stroke/TIA by modifying/controlling the risk factors that you have. Pt.advised to take the medications as prescribed, which will be detailed in the discharge instructions, and to not stop taking them without consulting their physician. In addition, pt. advised to maintain a healthy diet, exercise regularly and to not smoke. [facility]'s Stroke treatment and prevention, Managing your recovery  notebook  provided and/or reviewed  with patient/family. The notebook includes, but not limited to, sections addressing warning signs & symptoms of a stroke, which are: sudden numbness or weakness especially on one side of the body, sudden confusion, difficulty speaking or understanding, sudden changes in vision, sudden dizziness or loss of balance/ coordination, sudden severe headache, syncope and seizure.   The need to call EMS (911) immediately if signs & symptoms occur is emphasized . The notebook also provides education on Stroke community resources and stroke advocacy. The need for follow-up after discharge was highlighted with patient/family with them being able to repeat understanding of the importance of this.       Electronically signed by Noemí Toro RN on 2/15/2023 at 1:15 AM

## 2023-02-15 NOTE — PROGRESS NOTES
Physical Therapy Evaluation and Discharge    The pt presents sitting up in recliner and willing to work with therapy though stating that his symptoms have improved. The pt states he has a headache but no dizziness. He stands from the recliner with independence and ambulates in the room with independence x 30 ft. Pt noting he has slight dizziness with L head turn and looking up but otherwise had no dizziness with vertical or horizontal head turns. Pt sat in chair at end of session and was left with all needs met, call light within reach. Nursing has pt up ad gagan in room. PT to sign off as pt is at his baseline and appears safe to return home at discharge.   4539 am-0945am  Christiano Wolf DPT

## 2023-02-15 NOTE — H&P
Hospital Medicine History & Physical      PCP: Kushal Angel MD    Date of Admission: 2/14/2023    Chief Complaint:    Chief Complaint   Patient presents with    Dizziness     Pt arrives with c/o dizziness that started yesterday around 1630. Pt states he was laying down and the room was spinning and he was unable to get up. He also states he experienced confusion and had one episode of vomiting. Pt states he is still experiencing some dizziness and nausea today. Pt states he had an episode of orthostatic syncope in July     History Of Present Illness:    Patient is a 80-year-old male with past medical history of hypertension, rheumatoid arthritis on Humira who presented to hospital due to episode of dizziness, difficulty maintaining balance, slurred speech. According to the patient's wife at the bedside patient conversation was not making sense during the episode. His episode came on yesterday, he called his doctor's office who recommended him to come to the emergency department for stroke evaluation. Patient also mentions he has intermittent history of tinnitus in his right ear. Patient otherwise denied fevers chills diarrhea constipation dysuria he also denied numbness tingling sensation weakness in arms legs. Past Medical History:          Diagnosis Date    Arthritis     rheumatoid    Asthma     Colon polyps 06/21/2018    COLONOSCOPY, DR CARTER GARCIA, CECUM POLYP TUBULAR ADENOMAS. Gastritis 06/21/2018    EGD, DR CARTER GARCIA, MILD CHRONIC GASTRITIS. GERD (gastroesophageal reflux disease)     Hyperlipidemia     Hyperplastic rectal polyp 06/21/2018    COLONOSCOPY, DR CARTER GARCIA, HYPERPLASTIC POLYP RECTAL.     Hypertension     Increased intraocular pressure     Migraine     Pneumonia     x 3    Sleep apnea     Testosterone deficiency 4/6/2017       Past Surgical History:          Procedure Laterality Date    CHEST WALL RESECTION Left 1/21/2022    REMOVAL OF 4 CENTIMETER LIPOMA: LEFT UPPER CHEST performed by Sho Winters MD at [facility], LAPAROSCOPIC  8-11-11    COLONOSCOPY  06/21/2018    COLONOSCOPY, DR CARTER GARCIA, COLON POLYPS X2, RECTAL POLYP X1. KNEE ARTHROSCOPY Bilateral     KNEE SURGERY Right 12/07/2020    meniscus repair    NASAL SEPTUM SURGERY      and palatoplasty       Medications Prior to Admission:      Prior to Admission medications    Medication Sig Start Date End Date Taking?  Authorizing Provider   rosuvastatin (CRESTOR) 20 MG tablet Take 1 tablet by mouth daily 12/16/22   Taiwo Hoffmann MD   citalopram (CELEXA) 40 MG tablet Take 1 tablet by mouth daily 12/16/22   Taiwo Hoffmann MD   olmesartan (BENICAR) 40 MG tablet TAKE 1 TABLET BY MOUTH DAILY 9/14/22   JORGE ALBERTO Franco - CNP   ipratropium (ATROVENT) 0.06 % nasal spray 1-2 sprays by Each Nostril route every 6 hours as needed for Rhinitis 8/5/22   Taiwo Hoffmann MD   Semaglutide-Weight Management [facility]) 2.4 MG/0.75ML SOAJ SC injection INJECT 2.4MG INTO THE SKIN EVERY 7 DAYS 7/13/22   Taiwo Hoffmann MD   ondansetron (ZOFRAN ODT) 8 MG TBDP disintegrating tablet Take 1 tablet by mouth every 8 hours as needed for Nausea 3/16/22   Taiwo Hoffmann MD   cetirizine (ZYRTEC) 10 MG tablet Take 1 tablet by mouth daily 12/16/21   Taiwo Hoffmann MD   amLODIPine (NORVASC) 10 MG tablet TAKE 1 TABLET BY MOUTH DAILY  Patient taking differently: Take 10 mg by mouth daily TAKE 1 TABLET BY MOUTH DAILY 12/16/21   Taiwo Hoffmann MD   hydroxychloroquine (PLAQUENIL) 200 MG tablet Take 200 mg by mouth 2 times daily 7/27/21   Historical Provider, MD   Cholecalciferol (VITAMIN D) 2000 units TABS tablet Take 1 tablet by mouth daily After finishing 12 week couse 3/21/19   Casa Aguiar MD   aspirin (ASPIRIN LOW DOSE) 81 MG tablet Take 1 tablet by mouth daily 12/27/16   Taiwo Hoffmann MD       Allergies:  Dye  [barium-containing compounds], Iodine, and Lisinopril    Social History:      TOBACCO: reports that he has never smoked. He has never used smokeless tobacco.  ETOH:   reports current alcohol use. Family History:       Reviewed in detail and non contributory          Problem Relation Age of Onset    High Blood Pressure Mother     Cancer Father         testicular, multiple myeloma    Heart Attack Father 47        CABG 4V    Other Father         MRSA    No Known Problems Brother     Early Death Maternal Uncle        REVIEW OF SYSTEMS:   Pertinent positives as noted in the HPI. All other systems reviewed and negative. PHYSICAL EXAM PERFORMED:    BP (!) 150/93   Pulse 89   Temp 97.1 °F (36.2 °C) (Oral)   Resp 16   Ht 6' 1\" (1.854 m)   Wt 270 lb 11.6 oz (122.8 kg)   SpO2 96%   BMI 35.72 kg/m²     General appearance:  No apparent distress, cooperative. HEENT:  Normal cephalic, atraumatic without obvious deformity. Conjunctivae/corneas clear. Neck: Supple, with full range of motion. No cervical lymphadenopathy  Respiratory:  Normal respiratory effort. Clear to auscultation, bilaterally without Rales/Wheezes/Rhonchi. Cardiovascular:  Regular rate and rhythm with normal S1/S2 without murmurs, rubs or gallops. Abdomen: Soft, non-tender, non-distended, normal bowel sounds. Musculoskeletal:  No edema noted bilaterally. No tenderness on palpation   Skin: no rash visible  Neurologic:  Neurologically intact without any focal sensory/motor deficits.   grossly non-focal.  Psychiatric:  Alert and oriented, normal mood  Peripheral Pulses: +2 palpable, equal bilaterally       Labs:     Recent Labs     02/14/23  1101   WBC 4.5   HGB 14.2   HCT 42.6        Recent Labs     02/14/23  1101      K 4.0      CO2 27   BUN 11   CREATININE 1.3   CALCIUM 9.5     Recent Labs     02/14/23  1101   AST 27   ALT 27   BILITOT 1.0   ALKPHOS 69     Recent Labs     02/14/23  1101   INR 1.02     Recent Labs     02/14/23  1101   TROPONINI <0.01       Urinalysis:      Lab Results   Component Value Date/Time NITRU Negative 09/26/2019 02:21 PM    BLOODU Negative 09/26/2019 02:21 PM    SPECGRAV 1.020 06/04/2018 08:49 AM    GLUCOSEU negative 09/26/2019 02:21 PM       Radiology:       CT HEAD WO CONTRAST   Final Result   No acute intracranial abnormality. CTA HEAD NECK W CONTRAST   Final Result   No acute abnormality or flow limiting stenosis of the major arteries of the   head and neck. XR CHEST PORTABLE   Final Result   No significant findings in the chest.                 Active Hospital Problems    Diagnosis Date Noted    Dizziness [R42] 02/14/2023     Priority: Medium       Patient is a 45-year-old male with past medical history of hypertension, rheumatoid arthritis on Humira who presented to hospital due to episode of dizziness, difficulty maintaining balance, slurred speech. According to the patient's wife at the bedside patient conversation was not making sense during the episode. His episode came on yesterday, he called his doctor's office who recommended him to come to the emergency department for stroke evaluation. Patient also mentions he has intermittent history of tinnitus in his right ear. Patient otherwise denied fevers chills diarrhea constipation dysuria he also denied numbness tingling sensation weakness in arms legs.     Assessment  Episodic dizziness, difficulty maintaining balance, slurred speech-rule out CVA  Uncontrolled hypertension      Plan  MRI brain without contrast  CT head, CTA head and neck performed in ED-negative for acute neurologic process  Aspirin, statin  Consult neurology  PT/OT consult  As needed labetalol, replace amlodipine with nifedipine for better BP control, patient has not been compliant with spironolactone due to frequency urination  Recommended patient to follow-up with ENT as outpatient for episodes of tinnitus  Monitor and replace electrolytes  DVT prophylaxis-Lovenox  Resume home medications  Diet: Diet NPO  Code Status: No Order    PT/OT Eval Status: ordered    Dispo - pending clinical improvement       Bonilla Isbell MD    The note was completed using EMR and Dragon dictation system. Every effort was made to ensure accuracy; however, inadvertent computerized transcription errors may be present. Thank you Cami Rush MD for the opportunity to be involved in this patient's care. If you have any questions or concerns please feel free to contact me at [phone].     Bonilla Isbell MD

## 2023-02-15 NOTE — PROGRESS NOTES
4 Eyes Skin Assessment     NAME:  Orin Fernandez  YOB: 1965  MEDICAL RECORD NUMBER:  [id number]    The patient is being assessed for  Admission    I agree that One RN has performed a thorough Head to Toe Skin Assessment on the patient. ALL assessment sites listed below have been assessed. Areas assessed by both nurses:    Head, Face, Ears, Shoulders, Back, Chest, Arms, Elbows, Hands, Sacrum. Buttock, Coccyx, Ischium, and Legs. Feet and Heels        Does the Patient have a Wound?  No noted wound(s)       Bertin Prevention initiated by RN: No   Wound Care Orders initiated by RN: No    Pressure Injury (Stage 3,4, Unstageable, DTI, NWPT, and Complex wounds) if present, place referral order by RN under : No    New and Established Ostomies, if present place, referral order under : No      Nurse 1 eSignature: Electronically signed by Cynthia Morales RN on 2/14/23 at 11:52 PM EST    **SHARE this note so that the co-signing nurse can place an eSignature**    Nurse 2 eSignature: Electronically signed by Kedar Iglesias RN on 2/14/23 at 11:53 PM EST

## 2023-02-15 NOTE — PROGRESS NOTES
Hospital Medicine Progress Note      Admit Date: 2/14/2023       CC: F/U for dizziness    HPI: Patient is a 79-year-old male with past medical history of hypertension, rheumatoid arthritis on Humira who presented to hospital due to episode of dizziness, difficulty maintaining balance, slurred speech. According to the patient's wife at the bedside patient conversation was not making sense during the episode. His episode came on yesterday, he called his doctor's office who recommended him to come to the emergency department for stroke evaluation. Patient also mentions he has intermittent history of tinnitus in his right ear. Patient otherwise denied fevers chills diarrhea constipation dysuria he also denied numbness tingling sensation weakness in arms legs. Interval History/Subjective: pt out of the room on my rounding for testing. Neurology is seeing the patient. Will get MRI as well to further eval.  Othostatics are normal.      Review of Systems:       The patient denied headaches, visual changes, LOC, SOB, CP, ABD pain, N/V/D, skin changes, new or worsening weakness or neuromuscular deficits. Comprehensive ROS negative except as mentioned above. Past Medical History:        Diagnosis Date    Arthritis     rheumatoid    Asthma     Colon polyps 06/21/2018    COLONOSCOPY, DR CARTER GARCIA, CECUM POLYP TUBULAR ADENOMAS. Gastritis 06/21/2018    EGD, DR CARTER GARCIA, MILD CHRONIC GASTRITIS. GERD (gastroesophageal reflux disease)     Hyperlipidemia     Hyperplastic rectal polyp 06/21/2018    COLONOSCOPY, DR CARTER GARCIA, HYPERPLASTIC POLYP RECTAL.     Hypertension     Increased intraocular pressure     Migraine     Pneumonia     x 3    Sleep apnea     Testosterone deficiency 4/6/2017       Past Surgical History:        Procedure Laterality Date    CHEST WALL RESECTION Left 1/21/2022    REMOVAL OF 4 CENTIMETER LIPOMA: LEFT UPPER CHEST performed by Joseph Baker Peng Darnell MD at [facility], LAPAROSCOPIC  8-11-11    COLONOSCOPY  06/21/2018    COLONOSCOPY, DR CARTER GARCIA, COLON POLYPS X2, RECTAL POLYP X1. KNEE ARTHROSCOPY Bilateral     KNEE SURGERY Right 12/07/2020    meniscus repair    NASAL SEPTUM SURGERY      and palatoplasty       Allergies:  Dye  [barium-containing compounds], Iodine, and Lisinopril    Past medical and surgical history reviewed. Any changes have been noted. PHYSICAL EXAM:  BP (!) 149/90   Pulse 92   Temp 98.4 °F (36.9 °C) (Oral)   Resp 18   Ht 6' 1\" (1.854 m)   Wt 268 lb 4.8 oz (121.7 kg)   SpO2 96%   BMI 35.40 kg/m²       Intake/Output Summary (Last 24 hours) at 2/15/2023 0932  Last data filed at 2/15/2023 0422  Gross per 24 hour   Intake 480 ml   Output --   Net 480 ml      Defer to due pt out of room on rounding      LABS:    Lab Results   Component Value Date    WBC 11.1 (H) 02/15/2023    HGB 14.1 02/15/2023    HCT 39.6 (L) 02/15/2023    MCV 92.0 02/15/2023     02/15/2023    LYMPHOPCT 23.8 02/14/2023    RBC 4.31 02/15/2023    MCH 32.6 02/15/2023    MCHC 35.5 02/15/2023    RDW 13.8 02/15/2023       Lab Results   Component Value Date    CREATININE 1.5 (H) 02/15/2023    BUN 19 02/15/2023     02/15/2023    K 4.1 02/15/2023     02/15/2023    CO2 24 02/15/2023       No results found for: MG    Lab Results   Component Value Date    ALT 27 02/14/2023    AST 27 02/14/2023    ALKPHOS 69 02/14/2023    BILITOT 1.0 02/14/2023        No flowsheet data found. Lab Results   Component Value Date    LABA1C 5.2 12/14/2022       Imaging:  CT HEAD WO CONTRAST   Final Result   No acute intracranial abnormality. CTA HEAD NECK W CONTRAST   Final Result   No acute abnormality or flow limiting stenosis of the major arteries of the   head and neck.          XR CHEST PORTABLE   Final Result   No significant findings in the chest.         MRI brain without contrast    (Results Pending)       Scheduled and prn Medications:    Scheduled Meds:   sodium chloride flush  10 mL IntraVENous 2 times per day    enoxaparin  30 mg SubCUTAneous BID    aspirin  81 mg Oral Daily    Or    aspirin  300 mg Rectal Daily    atorvastatin  40 mg Oral Nightly    hydroxychloroquine  200 mg Oral BID    NIFEdipine  60 mg Oral Daily    citalopram  40 mg Oral Daily     Continuous Infusions:   sodium chloride       PRN Meds:.acetaminophen, perflutren lipid microspheres, sodium chloride flush, sodium chloride, ondansetron **OR** ondansetron, ipratropium, labetalol    Assessment & Plan:          Episodic dizziness, difficulty maintaining balance, slurred speech-rule out CVA  - Neurology consulted  - MRI brain ordered; no prior hx CVA  - PT/OT  - orthostatic VS normal  - CT neg for acute processes  - labs unremarkable besides Cr 1.5 - IVF for hydration, likely due to prerenal       Uncontrolled hypertension  - PRN labetalol  - adjusted meds for better control. Changed amlodpine to nifedipine  - has not been compliant with spironolactone due to urination freq  - home meds: olmesartan, amlodipine, spironolactone (not taking). Hold ARB in setting of SERENA      SERENA  - cr slightly elevated on adm, likely prerenal  - holding nephrotoxic meds including ARB  - monitor BP  - IVF x24 hours   - monitor BMP          Reported tinnitus  - f/u ENT   - sx could be r/t inner ear issue    Low Vt D  - replace and f/u PCP     HLD  - cont home statin on d/c    Continue current regimen/therapies. Monitor. Adjust medical regimen as appropriate. Body mass index is 35.4 kg/m². The patient and / or the family were informed of the results of any tests, a time was given to answer questions, a plan was proposed and they agreed with plan. DVT ppx: lovenox      Diet: ADULT DIET;  Regular    Consults:  IP CONSULT TO PHARMACY  PHARMACY TO CHANGE BASE FLUIDS  IP CONSULT TO HOSPITALIST  IP CONSULT TO NEUROLOGY  IP CONSULT TO CASE MANAGEMENT    DISPO/placement plan: pending    Code Status: Full Code      Luis Enrique Lopez, JORGE ALBERTO - CNP  02/15/23

## 2023-02-15 NOTE — PLAN OF CARE
Problem: Discharge Planning  Goal: Discharge to home or other facility with appropriate resources  Outcome: Progressing  Flowsheets  Taken 2/14/2023 2344  Discharge to home or other facility with appropriate resources: Identify barriers to discharge with patient and caregiver  Taken 2/14/2023 2316  Discharge to home or other facility with appropriate resources: Identify barriers to discharge with patient and caregiver     Problem: Safety - Adult  Goal: Free from fall injury  Outcome: Progressing

## 2023-02-15 NOTE — PROGRESS NOTES
NAME:  Nivia Villegas  YOB: 1965  MEDICAL RECORD NUMBER:  [id number]  TODAYS DATE:  2/15/2023    Discussed personal risk factors for Stroke /TIA with patient/family, and ways to reduce the risk for a recurrent stroke. Patient's personal risk factors which were identified are:     [x] Alcohol Abuse: check with your physician before any alcohol consumption. [x] Atrial fibrillation: may cause blood clots. [x] Drug Abuse: Seek help, talk with your doctor  [x] Clotting Disorder  [] Diabetes  [x] Family history of stroke or heart disease  [x] High Blood Pressure/Hypertension: work with your physician. [x] High cholesterol: monitor cholesterol levels with your physician. [x] Overweight/Obesity: work with your physician for your ideal body weight. [x] Physical Inactivity: get regular exercise as directed by your physician. [x] Personal history of previous TIA or stroke  [x] Poor Diet; decrease salt (sodium) in your diet, follow diet directed by physician. [] Smoking: Cigarette/Cigar: stop smoking. Advised pt. that you can reduce your risk for stroke/TIA by modifying/controlling the risk factors that you have. Pt.advised to take the medications as prescribed, which will be detailed in the discharge instructions, and to not stop taking them without consulting their physician. In addition, pt. advised to maintain a healthy diet, exercise regularly and to not smoke. [facility]'s Stroke treatment and prevention, Managing your recovery  notebook  provided and/or reviewed  with patient/family. The notebook includes, but not limited to, sections addressing warning signs & symptoms of a stroke, which are: sudden numbness or weakness especially on one side of the body, sudden confusion, difficulty speaking or understanding, sudden changes in vision, sudden dizziness or loss of balance/ coordination, or sudden severe headache.   The need to call EMS (911) immediately if signs & symptoms occur is emphasized . The notebook also provides education on Stroke community resources and stroke advocacy. The need for follow-up after discharge was highlighted with patient/family with them being able to repeat understanding of the importance of this.       Electronically signed by Leanne Rendon RN on 2/15/2023 at 8:39 AM

## 2023-02-15 NOTE — CONSULTS
Neurology Consult Note  Reason for Consult: CVA    Chief complaint: spinning    Dr Gilmer Seals MD asked me to see Aileen Johnston in consultation for evaluation of CVA    History of Present Illness:  Aileen Johnston is a 56 y.o. male who presents with spinning sensation, confusion. I obtained my information via interview w/ the patient, supplemented by chart review. The patient says that on Monday he was walking up the stairs. He sat down on the step in order to pet the cat and he felt a little dizzy,  just like thinks may be moving around a bit. He decided to lay down and things began intensely spinning. His symptoms were so bad that he could not get himself up safely. He was bouncing off the wall. Eventually he was able to crawl and kept telling his wife that he needed to get up but she was helping him stay down because he was so symptomatic. He eventually got to the bathroom where he became extremely nauseated and vomited. The spinning had gotten better after that, though not completely gone, as he was able to stand up and use mouthwash. His recollection of the event is kind of spotty. His head still felt sort of funny and was nauseated, though does have some issues w/ nausea at baseline. Over the next couple of hours there were some concerns about his mental status. His wife had reported he was \"out of it\". He was not acting like himself, saying things different than would be expected. His speech wasn't slurred but slower. He was talking more deliberate. His brother on the phone noticed this as well. The plan was to call his PCP the following morning, which they did, and was advised to come to the ED in order to be evaluated. /99. CT head and CTA head/neck negative. NIH 0. Currently he feels OK. He says he had COVID in July last year. Afterwards had had syncope which was ultimately attributed to orthostatic hypotension and dehydration.   He also says that he went on vacation after he recovered from [facility] and really has poor recollection a lot of the things that he did there with his family. He mentions that over the past month he has noticed episodes (maybe once a week or so) where he will have trouble getting words out. He describes this as sort of stammering for a few seconds. He also said last week he was getting sharp pains on the L side of his head and w/ associated LUE twitching. Does have a remote (15+ years ago) hx of migraines. He is treated by Rheumatology for RA. Medical History:  Past Medical History:   Diagnosis Date    Arthritis     rheumatoid    Asthma     Colon polyps 06/21/2018    COLONOSCOPY, DR CARTER GARCIA, CECUM POLYP TUBULAR ADENOMAS. Gastritis 06/21/2018    EGD, DR CARTER GARCIA, MILD CHRONIC GASTRITIS. GERD (gastroesophageal reflux disease)     Hyperlipidemia     Hyperplastic rectal polyp 06/21/2018    COLONOSCOPY, DR CARTER GARCIA, HYPERPLASTIC POLYP RECTAL. Hypertension     Increased intraocular pressure     Migraine     Pneumonia     x 3    Sleep apnea     Testosterone deficiency 4/6/2017     Past Surgical History:   Procedure Laterality Date    CHEST WALL RESECTION Left 1/21/2022    REMOVAL OF 4 CENTIMETER LIPOMA: LEFT UPPER CHEST performed by Miryam Taveras MD at [address] [place] [address], LAPAROSCOPIC  8-11-11    COLONOSCOPY  06/21/2018    COLONOSCOPY, DR CARTER GARCIA, COLON POLYPS X2, RECTAL POLYP X1.     KNEE ARTHROSCOPY Bilateral     KNEE SURGERY Right 12/07/2020    meniscus repair    NASAL SEPTUM SURGERY      and palatoplasty     Scheduled Meds:   sodium chloride flush  10 mL IntraVENous 2 times per day    enoxaparin  30 mg SubCUTAneous BID    aspirin  81 mg Oral Daily    Or    aspirin  300 mg Rectal Daily    atorvastatin  40 mg Oral Nightly    hydroxychloroquine  200 mg Oral BID    NIFEdipine  60 mg Oral Daily    citalopram  40 mg Oral Daily     Medications Prior to Admission:   rosuvastatin (CRESTOR) 20 MG tablet, Take 1 tablet by mouth daily  citalopram (CELEXA) 40 MG tablet, Take 1 tablet by mouth daily  olmesartan (BENICAR) 40 MG tablet, TAKE 1 TABLET BY MOUTH DAILY  ipratropium (ATROVENT) 0.06 % nasal spray, 1-2 sprays by Each Nostril route every 6 hours as needed for Rhinitis  Semaglutide-Weight Management (WEGOVY) 2.4 MG/0.75ML SOAJ SC injection, INJECT 2.4MG INTO THE SKIN EVERY 7 DAYS  ondansetron (ZOFRAN ODT) 8 MG TBDP disintegrating tablet, Take 1 tablet by mouth every 8 hours as needed for Nausea  cetirizine (ZYRTEC) 10 MG tablet, Take 1 tablet by mouth daily  amLODIPine (NORVASC) 10 MG tablet, TAKE 1 TABLET BY MOUTH DAILY (Patient taking differently: Take 10 mg by mouth daily TAKE 1 TABLET BY MOUTH DAILY)  hydroxychloroquine (PLAQUENIL) 200 MG tablet, Take 200 mg by mouth 2 times daily  Cholecalciferol (VITAMIN D) 2000 units TABS tablet, Take 1 tablet by mouth daily After finishing 12 week couse  aspirin (ASPIRIN LOW DOSE) 81 MG tablet, Take 1 tablet by mouth daily    Allergies   Allergen Reactions    Dye  [Barium-Containing Compounds]      Other reaction(s): sneezing    Iodine Other (See Comments)     IVP dye reaction. Could not stop sneezing. Used a second time, and took benadryl prior and no issues. Lisinopril Other (See Comments)     Cough       Family History   Problem Relation Age of Onset    High Blood Pressure Mother     Cancer Father         testicular, multiple myeloma    Heart Attack Father 47        CABG 4V    Other Father         MRSA    No Known Problems Brother     Early Death Maternal Uncle      Social History     Tobacco Use   Smoking Status Never   Smokeless Tobacco Never     Social History     Substance and Sexual Activity   Drug Use No     Social History     Substance and Sexual Activity   Alcohol Use Yes    Comment: rare     ROS  Constitutional- No weight loss or fevers  Eyes- No diplopia. No photophobia.   Ears/nose/throat- No dysphagia. No Dysarthria  Cardiovascular- No palpitations. No chest pain  Respiratory- No dyspnea. No Cough  Gastrointestinal- No Abdominal pain. No Vomiting. Genitourinary- No incontinence. No urinary retention  Musculoskeletal- No myalgia. No arthralgia  Skin- No rash. No easy bruising. Psychiatric- No depression. No anxiety  Endocrine- No diabetes. No thyroid issues. Hematologic- No bleeding difficulty. No fatigue  Neurologic- No weakness. No Headache. Exam  Blood pressure (!) 149/90, pulse 92, temperature 98.4 °F (36.9 °C), temperature source Oral, resp. rate 18, height 6' 1\" (1.854 m), weight 268 lb 4.8 oz (121.7 kg), SpO2 96 %. Constitutional    Vital signs: BP, HR, and RR reviewed   General alert, no distress  Eyes: unable to visualize the fundi  Cardiovascular: no peripheral edema. Psychiatric: cooperative with examination, no psychotic behavior noted. Neurologic  Mental status:   orientation to person, place, time. General fund of knowledge grossly intact   Memory grossly intact   Attention intact as able to attend well to the exam     Language fluent in conversation   Comprehension intact; follows simple commands  Cranial nerves:   CN2: visual fields full   CN 3,4,6: extraocular muscles intact. Pupils are equal, round, reactive bilaterally. CN5: facial sensation symmetric   CN7: face symmetric without dysarthria  CN8: hearing grossly intact  CN9: palate elevated symmetrically  CN11: trap full strength on shoulder shrug  CN12: tongue midline with protrusion  Strength: good strength in all 4 extremities   Sensory: light touch intact in all 4 extremities. Cerebellar/coordination: finger nose finger normal without ataxia  Tone: normal in all 4 extremities  Gait: deferred at this time for safety.       Labs  Glucose 91  Na 138  K 4.0  BUN 11  Cr 1.3    LDL 99  HgA1c 5.1    ALT 27  AST 27    WBC 11.1K  Hg 14.1  Platelets 709    Drug screen negative    Flu negative  COVID negative    Studies  CT head w/o 2/14/23, independently reviewed  Impression   No acute intracranial abnormality. CTA head/neck 2/14/23, independently reviewed  Impression   No acute abnormality or flow limiting stenosis of the major arteries of the   head and neck. Impression/Recommendations  Episode of vertigo, N/V, and some alteration in mental status. Hyperlipidemia. Hypertension. RA. Would be challenging to definitively rule out a central neurologic event even if his MRI is w/out any acute findings. Certainly peripheral vertigo is also on the differential.      Agree w/ completion of stroke/TIA workup including brain MRI and TTE. Continue home antiplatelet (81 mg daily) for now. Continue statin. Agree w/ increase to 40 mg for better LDL control. PT/OT evaluation. He had mentioned other symptoms that occurred in the past/recently which were concerning to him (memory issues, head pain/arm twitching, brief episodes of speech/language impairment, etc.). I'll order an EEG while he is here.       Dimple Mcnair NP  [address] [place] [address] [address] Neurology    A copy of this note was provided for Dr Adonay Arciniega MD

## 2023-02-15 NOTE — ED NOTES
Patient report to curtis SCRUGGS on [address] [facility]  02/14/23 2036

## 2023-02-15 NOTE — PROGRESS NOTES
Occupational Therapy  Pt seen in room. Up independently in room. Demonstrated ability to amb without device and without LOB. Reported he dressed himself without assist this AM.  Pt stated he only feels nausea this morning and that dizziness has subsided. Safe for d/c home with wife assist.  No further OT needed at this time. Rm Bourgeoiser scored a 24/24 on the AM-PAC ADL Inpatient form. At this time, no further OT is recommended upon discharge due to independence. Recommend patient returns to prior setting with prior services.         Seen from 9:35-9:45am.  Didi Nicole OTR/RICCARDO #[id number] 2/15/2023 9:48 AM

## 2023-02-15 NOTE — PROGRESS NOTES
Pt admitted into room 5112 via stretcher. Pt assisted into bed and placed on a portable telemetry box. Box # and rhythm confirmed with the CMU. Admission assessment completed and charted. Pt oriented to room bed unit and call light. Score 0 on NIH. Pt states he understands how to use call light and will call if he needs assist.  Will continue to monitor.

## 2023-02-15 NOTE — PLAN OF CARE
Problem: Discharge Planning  Goal: Discharge to home or other facility with appropriate resources  2/15/2023 0838 by Fanny Dhillon RN  Outcome: Allan Dhillon (Taken 2/15/2023 0419 by Marylou Dennis RN)  Discharge to home or other facility with appropriate resources: Identify barriers to discharge with patient and caregiver  2/15/2023 0112 by Marylou Dennis RN  Outcome: Progressing  2/14/2023 2352 by Marylou Dennis RN  Outcome: Progressing  Flowsheets  Taken 2/14/2023 2344  Discharge to home or other facility with appropriate resources: Identify barriers to discharge with patient and caregiver  Taken 2/14/2023 2316  Discharge to home or other facility with appropriate resources: Identify barriers to discharge with patient and caregiver     Problem: Safety - Adult  Goal: Free from fall injury  2/15/2023 0838 by Fanny Dhillon RN  Outcome: Progressing  2/15/2023 0112 by Marylou Dennis RN  Outcome: Progressing  2/14/2023 2352 by Marylou Dennis RN  Outcome: Progressing     Problem: Neurosensory - Adult  Goal: Achieves stable or improved neurological status  2/15/2023 0838 by Fanny Dhillon RN  Outcome: Progressing  2/15/2023 0112 by Marylou Dennis RN  Outcome: Progressing  Goal: Absence of seizures  2/15/2023 0838 by Fanny Dhillon RN  Outcome: Progressing  2/15/2023 0112 by Marylou Dennis RN  Outcome: Progressing  Goal: Remains free of injury related to seizures activity  2/15/2023 0838 by Fanny Dhillon RN  Outcome: Progressing  2/15/2023 0112 by Marylou Dennis RN  Outcome: Progressing  Goal: Achieves maximal functionality and self care  2/15/2023 0838 by Fanny Dhillon RN  Outcome: Progressing  2/15/2023 0112 by Marylou Dennis RN  Outcome: Progressing     Problem: Respiratory - Adult  Goal: Achieves optimal ventilation and oxygenation  2/15/2023 0838 by Fanny Dhillon RN  Outcome: Progressing  2/15/2023 0112 by Marylou Dennis RN  Outcome: Progressing

## 2023-02-15 NOTE — PROGRESS NOTES
Speech Language Pathology  Facility/Department: [address] CARE  Initial Speech/Language/Cognitive Assessment  DISCHARGE SUMMARY    NAME: Aileen Johnston  : 1965   MRN: [id number]    ADMISSION DATE: 2023    ADMITTING DIAGNOSIS:   Transient alteration of awareness [R40.4]  Dizziness [R42]  Poor high blood pressure control [I10]     Aileen Johnston has Hypercholesteremia; Hypertension; Chronic rhinitis; Migraine; Diverticula of colon; Colon polyps; Vitamin D deficiency; MONISHA (obstructive sleep apnea); Gilbert syndrome; Multiple renal cysts; Testosterone deficiency; Lipoma of anterior chest wall; PMR (polymyalgia rheumatica) ([address] Temporal arteritis ([address] Increased intraocular pressure; Elevated liver enzymes; Low testosterone; Anxiety; Insomnia; and Dizziness on their problem list.    DATE ONSET: 2023     Date of Eval: 2/15/2023   Evaluating Therapist: MOOSE Mitchell    CHART REVIEW:  2023 admitted with c/o dizziness; concern for CVA    IMAGING:  CXR: 2023  Impression   No significant findings in the chest.     CT HEAD: 2023  Impression   No acute intracranial abnormality. CTA HEAD/NECK: 2023  Impression   No acute intracranial abnormality. BRAIN MRI: scheduled for 2/15/2023      Primary Complaint: patient denies concern for speech/language imps at this time but does endorse h/o prior episodes with reduced recall    Pain:  Pain Assessment: None - Denies Pain    Vision/ Hearing  Vision: Within Functional Limits  Hearing: Within functional limits    Assessment:  Reports h/o episodic events with reduced recall. No apparent motor speech or receptive/expressive/cognitive language imps at this time. No apparent current skilled ST. Please reconsult should status change    Recommendations:  Requires SLP Intervention: No  Patient Education Response: Verbalizes understanding      Subjective:  Accepted and tolerated evaluation at bedside.     Social/Functional History  Lives With: Spouse;Daughter  ADL Assistance: Independent  Homemaking Assistance: Independent  Active : Yes  Occupation: Full time employment ()    Objective:    Oral Motor   Labial: No impairment  Lingual: No impairment  Velum: No Impairment    Motor Speech  Overall Impairment Severity: None    Auditory Comprehension  Comprehension: Within Functional Limits    Verbal Expression  Verbal Expression: Within functional limits    Pragmatics/Social Functioning  Pragmatics: Within functional limits    Cognitive-Language  Overall Orientation Status: Within Functional Limits  Attention: Within Functional Limits  Memory: Within Functional Limits  Problem Solving: Within Functional Limits  Numeric Reasoning: Within Functional Limits  Abstract Reasoning: Within Functional Limits  Safety/Judgment: Within Functional Limits    Prognosis:  Speech Therapy Prognosis: Good  Consulted and agree with results and recommendations: RN;Patient    Education:  Patient Education Response: Verbalizes understanding      Therapy Time:  9:35 AM - 10:08 AM  33 minutes    Electronically signed by MOOSE Melgar on 2/15/2023 at 10:08 AM

## 2023-02-16 VITALS
SYSTOLIC BLOOD PRESSURE: 155 MMHG | HEIGHT: 73 IN | BODY MASS INDEX: 35.5 KG/M2 | DIASTOLIC BLOOD PRESSURE: 95 MMHG | OXYGEN SATURATION: 97 % | TEMPERATURE: 98.6 F | RESPIRATION RATE: 16 BRPM | WEIGHT: 267.86 LBS | HEART RATE: 77 BPM

## 2023-02-16 LAB
ANION GAP SERPL CALCULATED.3IONS-SCNC: 8 MMOL/L (ref 3–16)
BUN BLDV-MCNC: 14 MG/DL (ref 7–20)
CALCIUM SERPL-MCNC: 8.7 MG/DL (ref 8.3–10.6)
CHLORIDE BLD-SCNC: 107 MMOL/L (ref 99–110)
CO2: 26 MMOL/L (ref 21–32)
CREAT SERPL-MCNC: 1.3 MG/DL (ref 0.9–1.3)
GFR SERPL CREATININE-BSD FRML MDRD: >60 ML/MIN/{1.73_M2}
GLUCOSE BLD-MCNC: 113 MG/DL (ref 70–99)
HCT VFR BLD CALC: 37.2 % (ref 40.5–52.5)
HEMOGLOBIN: 12.8 G/DL (ref 13.5–17.5)
MCH RBC QN AUTO: 32.5 PG (ref 26–34)
MCHC RBC AUTO-ENTMCNC: 34.4 G/DL (ref 31–36)
MCV RBC AUTO: 94.3 FL (ref 80–100)
PDW BLD-RTO: 13.5 % (ref 12.4–15.4)
PLATELET # BLD: 227 K/UL (ref 135–450)
PMV BLD AUTO: 7.5 FL (ref 5–10.5)
POTASSIUM REFLEX MAGNESIUM: 4 MMOL/L (ref 3.5–5.1)
RBC # BLD: 3.95 M/UL (ref 4.2–5.9)
SODIUM BLD-SCNC: 141 MMOL/L (ref 136–145)
WBC # BLD: 8.5 K/UL (ref 4–11)

## 2023-02-16 PROCEDURE — 6360000002 HC RX W HCPCS: Performed by: INTERNAL MEDICINE

## 2023-02-16 PROCEDURE — 80048 BASIC METABOLIC PNL TOTAL CA: CPT

## 2023-02-16 PROCEDURE — 6370000000 HC RX 637 (ALT 250 FOR IP): Performed by: INTERNAL MEDICINE

## 2023-02-16 PROCEDURE — 95819 EEG AWAKE AND ASLEEP: CPT

## 2023-02-16 PROCEDURE — 94760 N-INVAS EAR/PLS OXIMETRY 1: CPT

## 2023-02-16 PROCEDURE — 36415 COLL VENOUS BLD VENIPUNCTURE: CPT

## 2023-02-16 PROCEDURE — 85027 COMPLETE CBC AUTOMATED: CPT

## 2023-02-16 PROCEDURE — 2580000003 HC RX 258: Performed by: INTERNAL MEDICINE

## 2023-02-16 RX ORDER — NIFEDIPINE 30 MG/1
60 TABLET, EXTENDED RELEASE ORAL DAILY
Qty: 30 TABLET | Refills: 3 | Status: SHIPPED | OUTPATIENT
Start: 2023-02-17

## 2023-02-16 RX ADMIN — HYDROXYCHLOROQUINE SULFATE 200 MG: 200 TABLET ORAL at 09:17

## 2023-02-16 RX ADMIN — NIFEDIPINE 60 MG: 30 TABLET, EXTENDED RELEASE ORAL at 09:17

## 2023-02-16 RX ADMIN — ASPIRIN 81 MG: 81 TABLET, COATED ORAL at 09:17

## 2023-02-16 RX ADMIN — ENOXAPARIN SODIUM 30 MG: 100 INJECTION SUBCUTANEOUS at 09:18

## 2023-02-16 RX ADMIN — Medication 10 ML: at 09:18

## 2023-02-16 RX ADMIN — CITALOPRAM HYDROBROMIDE 40 MG: 20 TABLET ORAL at 09:17

## 2023-02-16 NOTE — PLAN OF CARE
Problem: Discharge Planning  Goal: Discharge to home or other facility with appropriate resources  Outcome: Progressing  Flowsheets (Taken 2/15/2023 2343)  Discharge to home or other facility with appropriate resources: Identify barriers to discharge with patient and caregiver     Problem: Safety - Adult  Goal: Free from fall injury  Outcome: Progressing     Problem: Neurosensory - Adult  Goal: Achieves stable or improved neurological status  Outcome: Progressing  Flowsheets (Taken 2/15/2023 2343)  Achieves stable or improved neurological status: Assess for and report changes in neurological status  Goal: Absence of seizures  Outcome: Progressing  Goal: Remains free of injury related to seizures activity  Outcome: Progressing  Flowsheets (Taken 2/15/2023 2343)  Remains free of injury related to seizure activity: Maintain airway, patient safety  and administer oxygen as ordered  Goal: Achieves maximal functionality and self care  Outcome: Progressing  Flowsheets (Taken 2/15/2023 2343)  Achieves maximal functionality and self care: Monitor swallowing and airway patency with patient fatigue and changes in neurological status     Problem: Respiratory - Adult  Goal: Achieves optimal ventilation and oxygenation  Outcome: Progressing  Flowsheets (Taken 2/15/2023 2343)  Achieves optimal ventilation and oxygenation:   Assess for changes in respiratory status   Assess for changes in mentation and behavior   Position to facilitate oxygenation and minimize respiratory effort     Problem: Cardiovascular - Adult  Goal: Maintains optimal cardiac output and hemodynamic stability  Outcome: Progressing  Flowsheets (Taken 2/15/2023 2343)  Maintains optimal cardiac output and hemodynamic stability:   Monitor blood pressure and heart rate   Monitor urine output and notify Licensed Independent Practitioner for values outside of normal range  Goal: Absence of cardiac dysrhythmias or at baseline  Outcome: Progressing  Flowsheets (Taken 2/15/2023 2343)  Absence of cardiac dysrhythmias or at baseline:   Monitor cardiac rate and rhythm   Assess for signs of decreased cardiac output     Problem: Skin/Tissue Integrity - Adult  Goal: Skin integrity remains intact  Outcome: Progressing  Flowsheets (Taken 2/15/2023 2343)  Skin Integrity Remains Intact: Monitor for areas of redness and/or skin breakdown  Goal: Incisions, wounds, or drain sites healing without S/S of infection  Outcome: Progressing  Flowsheets (Taken 2/15/2023 2343)  Incisions, Wounds, or Drain Sites Healing Without Sign and Symptoms of Infection: TWICE DAILY: Assess and document skin integrity  Goal: Oral mucous membranes remain intact  Outcome: Progressing  Flowsheets (Taken 2/15/2023 2343)  Oral Mucous Membranes Remain Intact: Assess oral mucosa and hygiene practices     Problem: Musculoskeletal - Adult  Goal: Return mobility to safest level of function  Outcome: Progressing  Flowsheets (Taken 2/15/2023 2343)  Return Mobility to Safest Level of Function: Assess patient stability and activity tolerance for standing, transferring and ambulating with or without assistive devices  Goal: Maintain proper alignment of affected body part  Outcome: Progressing  Goal: Return ADL status to a safe level of function  Outcome: Progressing  Flowsheets (Taken 2/15/2023 2343)  Return ADL Status to a Safe Level of Function: Administer medication as ordered     Problem: Gastrointestinal - Adult  Goal: Minimal or absence of nausea and vomiting  Outcome: Progressing  Flowsheets (Taken 2/15/2023 2343)  Minimal or absence of nausea and vomiting: Administer IV fluids as ordered to ensure adequate hydration  Goal: Maintains or returns to baseline bowel function  Outcome: Progressing  Flowsheets (Taken 2/15/2023 2343)  Maintains or returns to baseline bowel function: Assess bowel function  Goal: Maintains adequate nutritional intake  Outcome: Progressing  Flowsheets (Taken 2/15/2023 2343)  Maintains adequate nutritional intake: Monitor percentage of each meal consumed     Problem: Genitourinary - Adult  Goal: Absence of urinary retention  Outcome: Progressing  Flowsheets (Taken 2/15/2023 2343)  Absence of urinary retention: Assess patients ability to void and empty bladder     Problem: Infection - Adult  Goal: Absence of infection at discharge  Outcome: Progressing  Flowsheets (Taken 2/15/2023 2343)  Absence of infection at discharge:   Assess and monitor for signs and symptoms of infection   Monitor lab/diagnostic results   Monitor all insertion sites i.e., indwelling lines, tubes and drains     Problem: Metabolic/Fluid and Electrolytes - Adult  Goal: Electrolytes maintained within normal limits  Outcome: Progressing  Flowsheets (Taken 2/15/2023 2343)  Electrolytes maintained within normal limits: Monitor labs and assess patient for signs and symptoms of electrolyte imbalances  Goal: Hemodynamic stability and optimal renal function maintained  Outcome: Progressing  Flowsheets (Taken 2/15/2023 2343)  Hemodynamic stability and optimal renal function maintained: Monitor labs and assess for signs and symptoms of volume excess or deficit  Goal: Glucose maintained within prescribed range  Outcome: Progressing     Problem: Hematologic - Adult  Goal: Maintains hematologic stability  Outcome: Progressing  Flowsheets (Taken 2/15/2023 2343)  Maintains hematologic stability: Assess for signs and symptoms of bleeding or hemorrhage

## 2023-02-16 NOTE — PROCEDURES
[address] [place] [address] 16                          ELECTROENCEPHALOGRAM REPORT    PATIENT NAME: Gen Ramos                 :        1965  MED REC NO:   [id number]                          ROOM:       [id number]  ACCOUNT NO:   [de-identified]                           ADMIT DATE: 2023  PROVIDER:     Xiang Leyva DO    DATE OF EE2023    REFERRING PROVIDER:  Jaimie Rivas NP    REASON FOR STUDY:  Memory loss, episodic speech and language impairment. BRIEF HISTORY AND NEUROLOGIC FINDINGS:  The patient is a 56-year-old  male being evaluated for memory loss and speech changes. MEDICATIONS:  The patient's centrally acting medications listed include  Celexa. EEG FINDINGS:  This is a 20-channel digital EEG performed utilizing  bipolar and referential montages. Wakefulness, drowsiness and sleep  were obtained during the recording. During maximum wakefulness, there  was a moderate voltage, symmetric, fairly well-regulated and reactive 10  Hz posterior background rhythm. The anterior background consists of low  voltage fast frequencies. Drowsiness is manifested by attenuation of  the waking background rhythms. Brief sleep was manifested by sleep  spindles and K-complexes. Photic stimulation was performed at various flash frequencies and evoked  a symmetric posterior driving response at multiple frequencies. Hyperventilation exercise was performed and produced some mild artifact  without obvious background slowing. A 1-channel EKG rhythm strip was reviewed and showed no obvious cardiac  dysrhythmias. EEG DIAGNOSIS:  Essentially normal awake and asleep EEG. CLINICAL INTERPRETATION:  No definite epileptiform activity or evidence  for focal cerebral dysfunction was present during this recording. If  seizures remain a clinical concern, then a repeat EEG may be of further  benefit.   Clinical correlation is advised.         Rickey Lewis DO    D: 02/16/2023 13:49:26       T: 02/16/2023 13:51:42     [email or web address]  Job#: [id number]     Doc#: [id number]    CC:

## 2023-02-16 NOTE — CARE COORDINATION
Case Management Discharge Note          Date / Time of Note: 2/16/2023 4:14 PM                  Patient Name: Jaspal Boyle   YOB: 1965  Diagnosis: Transient alteration of awareness [R40.4]  Dizziness [R42]  Poor high blood pressure control [I10]   Date / Time: 2/14/2023  9:38 AM    Financial:  Payor: Gayle Logan / Plan: Emiliano Gu PPO / Product Type: *No Product type* /      Pharmacy:    [facility]  #[id number] - [place], [address] - [facility] [address]  Phone: [phone] Fax: [phone]    [facility] [place], [address]  [address]  [address] [facility]  [address] [facility] [address]  Phone: [phone] Fax: [phone]    [address] [facility] [address] [phone]  [place] [address]  Phone: [phone] Fax: [phone]      Assistance purchasing medications?: Potential Assistance Purchasing Medications: No  Assistance provided by Case Management: None at this time    DISCHARGE Disposition: Home- No Services Needed    Transportation:  Transportation PLAN for discharge: family   Mode of Transport: Private Car    Additional CM Notes:   Discharge order noted. Patient family to transport. Denied any needs. The Plan for Transition of Care is related to the following treatment goals of Transient alteration of awareness [R40.4]  Dizziness [R42]  Poor high blood pressure control [I10]    The Patient and/or patient representative Adonay Silva and his family were provided with a choice of provider and agrees with the discharge plan Not Indicated    Freedom of choice list was provided with basic dialogue that supports the patient's individualized plan of care/goals and shares the quality data associated with the providers.  Not Indicated    Thomas Waite RN  [facility] Management Department  Ph: [phone]

## 2023-02-16 NOTE — PROGRESS NOTES
Hospital Medicine Progress Note      Admit Date: 2/14/2023       CC: F/U for dizziness     HPI:  Patient is a 72-year-old male with past medical history of hypertension, rheumatoid arthritis on Humira who presented to hospital due to episode of dizziness, difficulty maintaining balance, slurred speech. According to the patient's wife at the bedside patient conversation was not making sense during the episode. His episode came on yesterday, he called his doctor's office who recommended him to come to the emergency department for stroke evaluation. Patient also mentions he has intermittent history of tinnitus in his right ear. Patient otherwise denied fevers chills diarrhea constipation dysuria he also denied numbness tingling sensation weakness in arms legs. 2/15 pt out of the room on my rounding for testing. Neurology is seeing the patient. Will get MRI as well to further eval.  Othostatics are normal.         Interval History/Subjective:  MRI neg. Echo fine without shunt. Could be migraine vs. Seizure per neuro. To get EEG. [address] Neli Perkins for d/c today if ok with neurology. Changed BP med for better control. F/u with his own cardiologist on d/c. He states he has meclizine at home if he needs it    Review of Systems:       The patient denied headaches, visual changes, LOC, SOB, CP, ABD pain, N/V/D, skin changes, new or worsening weakness or neuromuscular deficits. Comprehensive ROS negative except as mentioned above. Past Medical History:        Diagnosis Date    Arthritis     rheumatoid    Asthma     Colon polyps 06/21/2018    COLONOSCOPY, DR CARTER GARCIA, CECUM POLYP TUBULAR ADENOMAS. Gastritis 06/21/2018    EGD, DR CARTER GARCIA, MILD CHRONIC GASTRITIS. GERD (gastroesophageal reflux disease)     Hyperlipidemia     Hyperplastic rectal polyp 06/21/2018    COLONOSCOPY, DR CARTER GARCIA, HYPERPLASTIC POLYP RECTAL.     Hypertension     Increased intraocular pressure Migraine     Pneumonia     x 3    Sleep apnea     Testosterone deficiency 4/6/2017       Past Surgical History:        Procedure Laterality Date    CHEST WALL RESECTION Left 1/21/2022    REMOVAL OF 4 CENTIMETER LIPOMA: LEFT UPPER CHEST performed by Criselda Flores MD at [facility], LAPAROSCOPIC  8-11-11    COLONOSCOPY  06/21/2018    COLONOSCOPY, DR CARTER GARCIA, COLON POLYPS X2, RECTAL POLYP X1. KNEE ARTHROSCOPY Bilateral     KNEE SURGERY Right 12/07/2020    meniscus repair    NASAL SEPTUM SURGERY      and palatoplasty       Allergies:  Dye  [barium-containing compounds], Iodine, and Lisinopril    Past medical and surgical history reviewed. Any changes have been noted. PHYSICAL EXAM:  BP (!) 149/101   Pulse 77   Temp 98.5 °F (36.9 °C) (Oral)   Resp 16   Ht 6' 1\" (1.854 m)   Wt 267 lb 13.7 oz (121.5 kg)   SpO2 97%   BMI 35.34 kg/m²       Intake/Output Summary (Last 24 hours) at 2/16/2023 1055  Last data filed at 2/16/2023 6890  Gross per 24 hour   Intake 2235.89 ml   Output 450 ml   Net 1785.89 ml        General appearance:   No apparent distress, appears stated age. Cooperative. HEENT:  Normocephalic, atraumatic. PERRLA. EOMi. Conjunctivae/corneas clear, no icterus, non-injected. Neck: Supple, with full range of motion. No jugular venous distention. Trachea midline. Respiratory:  Normal respiratory effort. Clear to auscultation, bilaterally without Rales/Wheezes/Rhonchi. Cardiovascular:  Regular rate and rhythm without murmurs, rubs or gallops. Abdomen: Soft, non-tender, non-distended, without rebound or guarding. Normal bowel sounds. Musculoskeletal:  No clubbing, cyanosis or edema bilaterally. Full range of motion without deformity. Skin: Skin color, texture, turgor normal.  No rashes or lesions. Neurologic:  Neurovascularly intact without any focal sensory/motor deficits. Cranial nerves: II-XII intact, grossly intact. No facial asymmetry, tongue midline. Psychiatric:  Alert and oriented, thought content appropriate  Capillary Refill: Brisk,< 3 seconds   Peripheral Pulses: +2 palpable, equal bilaterally       LABS:    Lab Results   Component Value Date    WBC 8.5 02/16/2023    HGB 12.8 (L) 02/16/2023    HCT 37.2 (L) 02/16/2023    MCV 94.3 02/16/2023     02/16/2023    LYMPHOPCT 23.8 02/14/2023    RBC 3.95 (L) 02/16/2023    MCH 32.5 02/16/2023    MCHC 34.4 02/16/2023    RDW 13.5 02/16/2023       Lab Results   Component Value Date    CREATININE 1.3 02/16/2023    BUN 14 02/16/2023     02/16/2023    K 4.0 02/16/2023     02/16/2023    CO2 26 02/16/2023       No results found for: MG    Lab Results   Component Value Date    ALT 27 02/14/2023    AST 27 02/14/2023    ALKPHOS 69 02/14/2023    BILITOT 1.0 02/14/2023        No flowsheet data found. Lab Results   Component Value Date    LABA1C 5.1 02/15/2023       Imaging:  MRI brain without contrast   Final Result   No acute infarct or other acute intracranial findings. CT HEAD WO CONTRAST   Final Result   No acute intracranial abnormality. CTA HEAD NECK W CONTRAST   Final Result   No acute abnormality or flow limiting stenosis of the major arteries of the   head and neck.          XR CHEST PORTABLE   Final Result   No significant findings in the chest.             Scheduled and prn Medications:    Scheduled Meds:   sodium chloride flush  10 mL IntraVENous 2 times per day    enoxaparin  30 mg SubCUTAneous BID    aspirin  81 mg Oral Daily    Or    aspirin  300 mg Rectal Daily    atorvastatin  40 mg Oral Nightly    hydroxychloroquine  200 mg Oral BID    NIFEdipine  60 mg Oral Daily    citalopram  40 mg Oral Daily     Continuous Infusions:   sodium chloride       PRN Meds:.acetaminophen, perflutren lipid microspheres, sodium chloride flush, sodium chloride, ondansetron **OR** ondansetron, ipratropium, labetalol    Assessment & Plan:           Episodic dizziness, difficulty maintaining balance, slurred speech-rule out CVA  - Neurology consulted  - MRI brain ordered; no prior hx CVA; negative MRI  - echo normal without shunt; EF 55-60%  - PT/OT  - orthostatic VS normal  - CT neg for acute processes  - labs unremarkable besides Cr 1.5 - IVF for hydration, likely due to prerenal   - EEG recommended to r/o seizure   - meclizine PRN         Uncontrolled hypertension  - PRN labetalol  - adjusted meds for better control. Changed amlodpine to nifedipine  - has not been compliant with spironolactone due to urination freq  - home meds: olmesartan, amlodipine, spironolactone (not taking). Hold ARB in setting of SERENA        SERENA  - cr slightly elevated on adm, likely prerenal  - holding nephrotoxic meds including ARB  - monitor BP  - IVF x24 hours   - monitor BMP            Reported tinnitus  - f/u ENT   - sx could be r/t inner ear issue     Low Vt D  - replace and f/u PCP      HLD  - cont home statin on d/c    Continue current regimen/therapies. Monitor. Adjust medical regimen as appropriate. Body mass index is 35.34 kg/m². The patient and / or the family were informed of the results of any tests, a time was given to answer questions, a plan was proposed and they agreed with plan. DVT ppx: lovenox       Diet: ADULT DIET;  Regular    Consults:  IP CONSULT TO PHARMACY  PHARMACY TO CHANGE BASE FLUIDS  IP CONSULT TO HOSPITALIST  IP CONSULT TO NEUROLOGY  IP CONSULT TO CASE MANAGEMENT    DISPO/placement plan: pending     Code Status: Full Code      Milvia Mills, JORGE ALBERTO - LUANN  02/16/23

## 2023-02-16 NOTE — PROGRESS NOTES
Patient with discharge orders. AVS printed and patient educated on d/c instructions. IV taken out prior to leaving. Patients wife picking them up. No further questions or concerns.

## 2023-02-17 ENCOUNTER — TELEPHONE (OUTPATIENT)
Dept: FAMILY MEDICINE CLINIC | Age: 58
End: 2023-02-17

## 2023-02-17 NOTE — TELEPHONE ENCOUNTER
Care Transitions Initial Follow Up Call    Outreach made within 2 business days of discharge: Yes    Patient: Joana Benitez Patient : 1965   MRN: [id number]  Reason for Admission: There are no discharge diagnoses documented for the most recent discharge. Discharge Date: 23       Spoke with: PATIENT    Discharge department/facility: [facility]    TCM Interactive Patient Contact:  Was patient able to fill all prescriptions: Yes  Was patient instructed to bring all medications to the follow-up visit: Yes  Is patient taking all medications as directed in the discharge summary?  Yes  Does patient understand their discharge instructions: Yes  Does patient have questions or concerns that need addressed prior to 7-14 day follow up office visit: no    Scheduled appointment with PCP within 7-14 days    Follow Up  Future Appointments   Date Time Provider True Hutsoni   2023  3:00 PM Ankit Moyer MD [facility]   3/16/2023 10:00 AM MD Ramana Parada HIS APPT ON Tuesday WITH DR Vladislav Patel' TO [facility].  [address], [address]

## 2023-02-18 NOTE — DISCHARGE SUMMARY
Hospital Medicine Discharge Summary    Patient ID: Patsi Hunter      Patient's PCP: Myron Son MD    Admit Date: 2/14/2023     Discharge Date: 2/16/2023      Admitting Physician: Snheal Perez MD     Discharge Physician: Luis Enrique Lopez, APRN - CNP       Discharge Diagnoses: Active Hospital Problems    Diagnosis Date Noted    Dizziness [R42] 02/14/2023     Priority: Medium       The patient was seen and examined on day of discharge and this discharge summary is in conjunction with any daily progress note from day of discharge. Disposition:  [x] Home  [] Home with home health [] Rehab [] Psych [] SNF  [] LTAC  [] Long term nursing home or group home [] Transfer to ICU  [] Transfer to PCU [] Other:    Hospital Course:  Patient is a 61-year-old male with past medical history of hypertension, rheumatoid arthritis on Humira who presented to hospital due to episode of dizziness, difficulty maintaining balance, slurred speech. According to the patient's wife at the bedside patient conversation was not making sense during the episode. His episode came on yesterday, he called his doctor's office who recommended him to come to the emergency department for stroke evaluation. Patient also mentions he has intermittent history of tinnitus in his right ear. Patient otherwise denied fevers chills diarrhea constipation dysuria he also denied numbness tingling sensation weakness in arms legs. MRI neg. Echo fine without shunt. Could be migraine vs. Seizure per neuro. EEG normal. Ok for d/c. Changed BP med for better control. F/u with his own cardiologist on d/c.       He states he has meclizine at home if he needs it        Episodic dizziness, difficulty maintaining balance, slurred speech-rule out CVA  - Neurology consulted  - MRI brain ordered; no prior hx CVA; negative MRI  - echo normal without shunt; EF 55-60%  - PT/OT  - orthostatic VS normal  - CT neg for acute processes  - labs unremarkable besides Cr 1.5 - IVF for hydration, likely due to prerenal   - EEG recommended to r/o seizure - neg   - meclizine PRN         Uncontrolled hypertension  - PRN labetalol  - adjusted meds for better control. Changed amlodpine to nifedipine  - has not been compliant with spironolactone due to urination freq  - home meds: olmesartan, amlodipine, spironolactone (not taking). Hold ARB in setting of SERENA        SERENA  - cr slightly elevated on adm, likely prerenal  - holding nephrotoxic meds including ARB  - monitor BP  - IVF x24 hours   - monitor BMP        Reported tinnitus at times in the past but not currently or with this issue  - f/u ENT   - sx could be r/t inner ear issue     Low Vt D  - replace and f/u PCP      HLD  - cont home statin on d/c    Exam:     BP (!) 155/95   Pulse 77   Temp 98.6 °F (37 °C) (Oral)   Resp 16   Ht 6' 1\" (1.854 m)   Wt 267 lb 13.7 oz (121.5 kg)   SpO2 97%   BMI 35.34 kg/m²   General appearance: No apparent distress, appears stated age and cooperative. HEENT: Pupils equal, round, and reactive to light. Conjunctivae/corneas clear. Neck: Supple, with full range of motion. No jugular venous distention. Trachea midline. Respiratory:  Normal respiratory effort. Clear to auscultation, bilaterally without Rales/Wheezes/Rhonchi. Cardiovascular: Regular rate and rhythm with normal S1/S2 without murmurs, rubs or gallops. Abdomen: Soft, non-tender, non-distended with normal bowel sounds. Musculoskelatal: No clubbing, cyanosis or edema bilaterally. Full range of motion without deformity. Skin: Skin color, texture, turgor normal.  No rashes or lesions. Neurologic:  Neurovascularly intact without any focal sensory/motor deficits.  Cranial nerves: II-XII intact, grossly non-focal.  Psychiatric: Alert and oriented, thought content appropriate, normal insight      Consults:     IP CONSULT TO PHARMACY  PHARMACY TO CHANGE BASE FLUIDS  IP CONSULT TO HOSPITALIST  IP CONSULT TO NEUROLOGY  IP CONSULT TO CASE MANAGEMENT    Diagnostic tests:    Imaging:  MRI brain without contrast   Final Result   No acute infarct or other acute intracranial findings. CT HEAD WO CONTRAST   Final Result   No acute intracranial abnormality. CTA HEAD NECK W CONTRAST   Final Result   No acute abnormality or flow limiting stenosis of the major arteries of the   head and neck. XR CHEST PORTABLE   Final Result   No significant findings in the chest.           Labs:  For convenience and continuity at follow-up the following most recent labs are provided:      CBC:    Lab Results   Component Value Date/Time    WBC 8.5 02/16/2023 05:05 AM    HGB 12.8 02/16/2023 05:05 AM    HCT 37.2 02/16/2023 05:05 AM     02/16/2023 05:05 AM       Renal:    Lab Results   Component Value Date/Time     02/16/2023 05:05 AM    K 4.0 02/16/2023 05:05 AM     02/16/2023 05:05 AM    CO2 26 02/16/2023 05:05 AM    BUN 14 02/16/2023 05:05 AM    CREATININE 1.3 02/16/2023 05:05 AM    CREATININE 1.4 12/17/2019 08:17 AM    CALCIUM 8.7 02/16/2023 05:05 AM             PCP/SNF to follow up: pcp 1 wk     D/C condition: stable    Code status: full      Discharge Medications:     Discharge Medication List as of 2/16/2023  3:43 PM             Details   NIFEdipine (PROCARDIA XL) 30 MG extended release tablet Take 2 tablets by mouth daily, Disp-30 tablet, R-3Normal                Details   rosuvastatin (CRESTOR) 20 MG tablet Take 1 tablet by mouth daily, Disp-30 tablet, R-5Normal      citalopram (CELEXA) 40 MG tablet Take 1 tablet by mouth daily, Disp-30 tablet, R-5Normal      olmesartan (BENICAR) 40 MG tablet TAKE 1 TABLET BY MOUTH DAILY, Disp-90 tablet, R-1Normal      ipratropium (ATROVENT) 0.06 % nasal spray 1-2 sprays by Each Nostril route every 6 hours as needed for Rhinitis, Disp-15 mL, R-2Normal      Semaglutide-Weight Management (WEGOVY) 2.4 MG/0.75ML SOAJ SC injection INJECT 2.4MG INTO THE SKIN EVERY 7 DAYS, Disp-9 mL, R-1Normal      ondansetron (ZOFRAN ODT) 8 MG TBDP disintegrating tablet Take 1 tablet by mouth every 8 hours as needed for Nausea, Disp-20 tablet, R-2Normal      cetirizine (ZYRTEC) 10 MG tablet Take 1 tablet by mouth daily, Disp-30 tablet, R-5Normal      hydroxychloroquine (PLAQUENIL) 200 MG tablet Take 200 mg by mouth 2 times dailyHistorical Med      Cholecalciferol (VITAMIN D) 2000 units TABS tablet Take 1 tablet by mouth daily After finishing 12 week couse, Disp-30 tablet, R-11Normal      aspirin (ASPIRIN LOW DOSE) 81 MG tablet Take 1 tablet by mouth daily, Disp-365 tablet, R-0Take w/ food             Time Spent on discharge is more than 30 minutes in the examination, evaluation, counseling and review of medications and discharge plan. Signed:    JORGE ALBERTO Gibson - CNP   2/18/2023      Thank you Cinda Mcarthur MD for the opportunity to be involved in this patient's care. If you have any questions or concerns please feel free to contact me at [phone].

## 2023-02-24 ENCOUNTER — OFFICE VISIT (OUTPATIENT)
Dept: FAMILY MEDICINE CLINIC | Age: 58
End: 2023-02-24

## 2023-02-24 VITALS
BODY MASS INDEX: 36.31 KG/M2 | OXYGEN SATURATION: 99 % | HEART RATE: 77 BPM | SYSTOLIC BLOOD PRESSURE: 140 MMHG | WEIGHT: 274 LBS | DIASTOLIC BLOOD PRESSURE: 78 MMHG | HEIGHT: 73 IN

## 2023-02-24 DIAGNOSIS — Z09 HOSPITAL DISCHARGE FOLLOW-UP: ICD-10-CM

## 2023-02-24 DIAGNOSIS — I10 PRIMARY HYPERTENSION: ICD-10-CM

## 2023-02-24 DIAGNOSIS — R42 DIZZINESS: Primary | ICD-10-CM

## 2023-02-24 RX ORDER — NIFEDIPINE 60 MG/1
TABLET, EXTENDED RELEASE ORAL
COMMUNITY
Start: 2023-02-16 | End: 2023-02-24

## 2023-02-24 RX ORDER — ADALIMUMAB 40MG/0.4ML
KIT SUBCUTANEOUS
COMMUNITY
Start: 2023-02-19

## 2023-02-24 SDOH — ECONOMIC STABILITY: FOOD INSECURITY: WITHIN THE PAST 12 MONTHS, THE FOOD YOU BOUGHT JUST DIDN'T LAST AND YOU DIDN'T HAVE MONEY TO GET MORE.: NEVER TRUE

## 2023-02-24 SDOH — ECONOMIC STABILITY: HOUSING INSECURITY
IN THE LAST 12 MONTHS, WAS THERE A TIME WHEN YOU DID NOT HAVE A STEADY PLACE TO SLEEP OR SLEPT IN A SHELTER (INCLUDING NOW)?: NO

## 2023-02-24 SDOH — ECONOMIC STABILITY: FOOD INSECURITY: WITHIN THE PAST 12 MONTHS, YOU WORRIED THAT YOUR FOOD WOULD RUN OUT BEFORE YOU GOT MONEY TO BUY MORE.: NEVER TRUE

## 2023-02-24 SDOH — ECONOMIC STABILITY: INCOME INSECURITY: HOW HARD IS IT FOR YOU TO PAY FOR THE VERY BASICS LIKE FOOD, HOUSING, MEDICAL CARE, AND HEATING?: NOT HARD AT ALL

## 2023-02-24 ASSESSMENT — PATIENT HEALTH QUESTIONNAIRE - PHQ9
SUM OF ALL RESPONSES TO PHQ9 QUESTIONS 1 & 2: 0
SUM OF ALL RESPONSES TO PHQ QUESTIONS 1-9: 0
SUM OF ALL RESPONSES TO PHQ QUESTIONS 1-9: 0
2. FEELING DOWN, DEPRESSED OR HOPELESS: 0
SUM OF ALL RESPONSES TO PHQ QUESTIONS 1-9: 0
1. LITTLE INTEREST OR PLEASURE IN DOING THINGS: 0
SUM OF ALL RESPONSES TO PHQ QUESTIONS 1-9: 0

## 2023-02-24 NOTE — PROGRESS NOTES
Post-Discharge Transitional Care  Follow Up      Billie Wrihgt   YOB: 1965    Date of Office Visit:  2/24/2023  Date of Hospital Admission: 2/14/23  Date of Hospital Discharge: 2/16/23  Risk of hospital readmission (high >=14%. Medium >=10%) :Readmission Risk Score: 4.9      Care management risk score Rising risk (score 2-5) and Complex Care (Scores >=6): No Risk Score On File     Non face to face  following discharge, date last encounter closed (first attempt may have been earlier): 02/17/2023    Call initiated 2 business days of discharge: Yes    ASSESSMENT/PLAN:   Dizziness  -Hospital notes and results reviewed. Medications reconciled. Work-up in the hospital was unremarkable. TIA versus uncontrolled hypertension versus vertigo versus migraine. Blood pressure slightly elevated in office today, but patient has been getting better readings at home. He is unsure of exactly what milligram dose he is doing with nifedipine but states he is only taking 1 tablet. Not sure if it is starting milligram or 60 mg. Urged patient to continue to monitor blood pressure at home. Increase to 60 mg if he is taking 30 mg and blood pressures not to goal.  Has a follow-up scheduled for next month. Order carotid study to rule out as a possible cause. -Encouraged to make appointment with ENT  -     VL DUP CAROTID BILATERAL; Future  Hospital discharge follow-up  [facility] notes and results reviewed. Medications reconciled. -     NM DISCHARGE MEDS RECONCILED W/ CURRENT OUTPATIENT MED LIST  Primary hypertension  -Patient was changed from amlodipine to nifedipine. Blood pressure borderline today. He states he has been getting much better readings at home. Continue regimen for now. Consider increasing nifedipine if blood pressure remains high. Medical Decision Making: moderate complexity  Return in about 20 days (around 3/16/2023) for Follow-up with fasting labs.            Subjective:   HPI:  Follow up of Hospital problems/diagnosis(es):   Episodic dizziness  Uncontrolled hypertension  SERENA  Vitamin D deficiency  Hyperlipidemia  Tinnitus    Inpatient course: Discharge summary reviewed- see chart. Interval history/Current status:  Oj Long presents today for hospital discharge follow-up. He was seen at [facility] from 2/14 and 2 2/16. Had had an episode of severe dizziness as well as altered mental status. He states that he suddenly was so dizzy that he was unable to get up. Felt the room spinning. He states that he is unable to completely recall the episode, but his family felt that his speech was slowed and at some points incomprehensible. They took him to the hospital to be evaluated for stroke. MRI was performed which was unremarkable. Neurology was consulted who considered seizure disorder. EEG was normal.  He states he is feeling better since leaving the hospital.  Did have an episode of dizziness last night but states it was not significant. Altered mental status. He has been checking his blood pressure since starting the nifedipine. States it has been running much better at home. Getting blood pressures in the 1 teens. Was getting pressures in the 140s before this. Denies any headaches, chest pain, or shortness of breath. He is inquiring about Doppler study for his carotids.       Patient Active Problem List   Diagnosis    Hypercholesteremia    Hypertension    Chronic rhinitis    Migraine    Diverticula of colon    Colon polyps    Vitamin D deficiency    MONISHA (obstructive sleep apnea)    Kalin Prime syndrome    Multiple renal cysts    Testosterone deficiency    Lipoma of anterior chest wall    PMR (polymyalgia rheumatica) (HCC)    Temporal arteritis (HCC)    Increased intraocular pressure    Elevated liver enzymes    Low testosterone    Anxiety    Insomnia    Dizziness       Medications listed as ordered at the time of discharge from hospital     Medication List            Accurate as of February 24, 2023  8:28 AM. If you have any questions, ask your nurse or doctor. CONTINUE taking these medications      aspirin 81 MG tablet  Commonly known as: Aspirin Low Dose  Take 1 tablet by mouth daily     cetirizine 10 MG tablet  Commonly known as: ZYRTEC  Take 1 tablet by mouth daily     citalopram 40 MG tablet  Commonly known as: CeleXA  Take 1 tablet by mouth daily     Humira Pen 40 MG/0.4ML Pnkt  Generic drug: Adalimumab     hydroxychloroquine 200 MG tablet  Commonly known as: PLAQUENIL     * NIFEdipine 60 MG extended release tablet  Commonly known as: PROCARDIA XL     * NIFEdipine 30 MG extended release tablet  Commonly known as: PROCARDIA XL  Take 2 tablets by mouth daily     olmesartan 40 MG tablet  Commonly known as: BENICAR  TAKE 1 TABLET BY MOUTH DAILY     ondansetron 8 MG Tbdp disintegrating tablet  Commonly known as: Zofran ODT  Take 1 tablet by mouth every 8 hours as needed for Nausea     rosuvastatin 20 MG tablet  Commonly known as: CRESTOR  Take 1 tablet by mouth daily     vitamin D 50 MCG (2000 UT) Tabs tablet  Commonly known as: CHOLECALCIFEROL  Take 1 tablet by mouth daily After finishing 12 week couse     Wegovy 2.4 MG/0.75ML Soaj SC injection  Generic drug: Semaglutide-Weight Management  INJECT 2.4MG INTO THE SKIN EVERY 7 DAYS           * This list has 2 medication(s) that are the same as other medications prescribed for you. Read the directions carefully, and ask your doctor or other care provider to review them with you. Medications marked \"taking\" at this time  No outpatient medications have been marked as taking for the 2/24/23 encounter (Office Visit) with JORGE ALBERTO See CNP. Medications patient taking as of now reconciled against medications ordered at time of hospital discharge: Yes    A comprehensive review of systems was negative except for what was noted in the HPI.     Objective:    BP (!) 140/78 (Site: Left Upper Arm, Position: Sitting, Cuff Size: Large Adult)   Pulse 77   Ht 6' 1\" (1.854 m)   Wt 274 lb (124.3 kg)   SpO2 99%   BMI 36.15 kg/m²   General Appearance: alert and oriented to person, place and time, well developed and well- nourished, in no acute distress  Skin: warm and dry, no rash or erythema  Head: normocephalic and atraumatic  Eyes: pupils equal, round, and reactive to light, extraocular eye movements intact, conjunctivae normal  ENT: tympanic membrane, external ear and ear canal normal bilaterally, nose without deformity, nasal mucosa and turbinates normal without polyps  Neck: supple and non-tender without mass, no thyromegaly or thyroid nodules, no cervical lymphadenopathy  Pulmonary/Chest: clear to auscultation bilaterally- no wheezes, rales or rhonchi, normal air movement, no respiratory distress  Cardiovascular: normal rate, regular rhythm, normal S1 and S2, no murmurs, rubs, clicks, or gallops, distal pulses intact, no carotid bruits  Abdomen: soft, non-tender, non-distended, normal bowel sounds, no masses or organomegaly  Extremities: no cyanosis, clubbing or edema  Musculoskeletal: normal range of motion, no joint swelling, deformity or tenderness  Neurologic: reflexes normal and symmetric, no cranial nerve deficit, gait, coordination and speech normal      An electronic signature was used to authenticate this note.   --JORGE ALBERTO Salinas - CNP

## 2023-02-28 NOTE — PROGRESS NOTES
Physician Progress Note      Urmila Maxwell  CSN #:                  [id number]  :                       1965  ADMIT DATE:       2023 9:38 AM  DISCH DATE:        2023 4:24 PM  RESPONDING  PROVIDER #:        Ember Steel APRN - CNP          QUERY TEXT:    Dear Gamaliel Hawkins, APRN-CNP,  Pt admitted with \"Episodic dizziness, difficulty maintaining balance, slurred   speech\". Pt noted to have Neuro consult noting \"This could have been   peripheral vertigo, but TIA is not completely ruled out\" and . If possible,   please document in progress notes and discharge summary if you are evaluating   and /or treating any of the following: The medical record reflects the following:  Risk Factors: Hx RA on Humira, sleep apnea,  intermittent history of tinnitus   in his right ear, migraine, HTN with noted non-compliance to meds, \"in July he   was diagnosed with orthostatic hypotension after a near syncopal episode\"  Clinical Indicators:  ED for \" dizziness, confusion, and vomiting\", mild   headache 2/10, noted to be \"mildly hypertensive\". B/P=150/93, Admitted for   Episodic dizziness, difficulty maintaining balance, slurred speech and   uncontrolled HTN. 2/15 Neuro consult notes \"This could have been peripheral   vertigo, but TIA is not completely ruled out\" and \"Would be challenging to   definitively rule out a central neurologic event even if his MRI is w/out any   acute findings. Certainly peripheral vertigo is also on the differential\"    PCP notes \"MRI neg. Echo fine without shunt.  Coul  Treatment: Neuro consult, ASA, Statin, MRI, EEG  Thank you,  Rafael Stanley RN, CDS  Options provided:  -- Dizziness, balance difficulty and slurred speech due to peripheral vertigo,   TIA ruled out after study  -- Presenting symptoms of Dizziness, balance difficulty and slurred speech due   to TIA  -- Dizziness, balance difficulty and slurred speech due to Migraine, TIA ruled   out after study  -- Dizziness, balance difficulty and slurred speech due to uncontrolled HTN ,   TIA ruled out after study  -- Other - I will add my own diagnosis  -- Disagree - Not applicable / Not valid  -- Disagree - Clinically unable to determine / Unknown  -- Refer to Clinical Documentation Reviewer    PROVIDER RESPONSE TEXT:    Dizziness, balance difficulty and slurred speech are due to uncontrolled HTN,   TIA ruled out after study. Query created by:  [id number] on 2/21/2023 9:08 AM      Electronically signed by:  Mitzi Finnegan CNP 2/28/2023 2:01 PM

## 2023-03-15 NOTE — PATIENT INSTRUCTIONS
You may receive a survey regarding the care you received during your visit. Your input is valuable to us. We encourage you to complete and return your survey. We hope you will choose us in the future for your healthcare needs. GENERAL OFFICE POLICIES      Telephone Calls: Messages will be answered within 1-2 business days, unless the provider is out of the office. If it is urgent a covering provider will answer. (this does not include Medication refills). MyChart: We recommend all patients sign up for [employer]. Through this portal you can see your lab results, request refills, schedule appointments, pay your bill and send messages to the office. [employer] messages will be answered within 1-2 business days unless the provider is out of the office. For urgent matters, please call the office. Appointments:  All appointments must be scheduled. We ask all patients to schedule their next follow up appointment before they leave the office to make sure you will be able to be seen before you run out of medications. 24 hours notice is required to cancel or reschedule an appointment to avoid being marked as a no show. You may be dismissed from the practice after 3 no shows. LATE for Appointment: If you are 15 or more minutes late for your appointment, you may be asked to reschedule. MA/LAB APPTS: Must be scheduled, cannot accept walk in lab visits. We only draw labs for patients established in our office. We only do injections for medications ordered by our office. Acute Sick Visits:  Nothing other than acute complaint will be addressed at this visit. TRADITIONAL MEDICARE  DOES NOT COVER PHYSICALS  MEDICARE WELLNESS VISITS: These are NOT physicals but the free annual visit offered by Medicare to discuss wellness issues. Medication refills, checkups, etc. will not be addressed during this visit.   Medication Refills: Refills are handled electronically so please contact your pharmacy for medication refills even if current refills have been exhausted. If you are on a controlled medication you will be referred to a specialist (pain specialist, psychiatry, etc). Forms: There is a $35 fee to fill out FMLA/Disability paperwork, payable at time of . Instead of the fee, you can choose to have the paperwork filled out during a separate office visit that is for filling out the paperwork only. Medication Samples: This office does not carry medication samples. If you need assistance in getting your medications, then please let the medical assistant know so they can help you sign up for a drug assistance program that can help get medications at a reduced cost or even free (if you qualify). Workman's Comp Claims: We do not handle workman's comp cases or claims. You will need to go to an urgent care to be seen or to whomever your employer uses.   General - Any abusive/rude behavior toward staff/providers may be cause for dismissal.

## 2023-03-16 ENCOUNTER — OFFICE VISIT (OUTPATIENT)
Dept: FAMILY MEDICINE CLINIC | Age: 58
End: 2023-03-16
Payer: COMMERCIAL

## 2023-03-16 VITALS
OXYGEN SATURATION: 99 % | HEIGHT: 73 IN | DIASTOLIC BLOOD PRESSURE: 76 MMHG | WEIGHT: 267 LBS | SYSTOLIC BLOOD PRESSURE: 130 MMHG | HEART RATE: 71 BPM | BODY MASS INDEX: 35.39 KG/M2

## 2023-03-16 DIAGNOSIS — R79.89 LOW TESTOSTERONE: ICD-10-CM

## 2023-03-16 DIAGNOSIS — E78.00 HYPERCHOLESTEREMIA: ICD-10-CM

## 2023-03-16 DIAGNOSIS — E55.9 VITAMIN D DEFICIENCY: ICD-10-CM

## 2023-03-16 DIAGNOSIS — R74.8 ELEVATED LIVER ENZYMES: ICD-10-CM

## 2023-03-16 DIAGNOSIS — I10 PRIMARY HYPERTENSION: ICD-10-CM

## 2023-03-16 DIAGNOSIS — E34.9 TESTOSTERONE DEFICIENCY: ICD-10-CM

## 2023-03-16 DIAGNOSIS — I10 ESSENTIAL HYPERTENSION: Primary | ICD-10-CM

## 2023-03-16 DIAGNOSIS — M35.3 PMR (POLYMYALGIA RHEUMATICA) (HCC): ICD-10-CM

## 2023-03-16 DIAGNOSIS — G47.33 OSA (OBSTRUCTIVE SLEEP APNEA): ICD-10-CM

## 2023-03-16 DIAGNOSIS — J31.0 CHRONIC RHINITIS: ICD-10-CM

## 2023-03-16 DIAGNOSIS — E80.4 GILBERT SYNDROME: ICD-10-CM

## 2023-03-16 DIAGNOSIS — E66.01 SEVERE OBESITY (BMI 35.0-39.9) WITH COMORBIDITY (HCC): ICD-10-CM

## 2023-03-16 PROCEDURE — 3075F SYST BP GE 130 - 139MM HG: CPT | Performed by: FAMILY MEDICINE

## 2023-03-16 PROCEDURE — 3078F DIAST BP <80 MM HG: CPT | Performed by: FAMILY MEDICINE

## 2023-03-16 PROCEDURE — 99214 OFFICE O/P EST MOD 30 MIN: CPT | Performed by: FAMILY MEDICINE

## 2023-03-16 RX ORDER — NIFEDIPINE 60 MG/1
TABLET, EXTENDED RELEASE ORAL
COMMUNITY
Start: 2023-03-07

## 2023-03-16 RX ORDER — SEMAGLUTIDE 2.4 MG/.75ML
INJECTION, SOLUTION SUBCUTANEOUS
Qty: 3 ML | Refills: 5 | Status: SHIPPED | OUTPATIENT
Start: 2023-03-16

## 2023-03-16 RX ORDER — OLMESARTAN MEDOXOMIL 40 MG/1
40 TABLET ORAL DAILY
Qty: 90 TABLET | Refills: 3 | Status: SHIPPED | OUTPATIENT
Start: 2023-03-16

## 2023-03-16 NOTE — PROGRESS NOTES
[facility]  Clinic Note    Date: 3/16/2023                                               Subjective:     Chief Complaint   Patient presents with    Hypertension     3 MO HTN ROUTINE FOLLOW UP      HPI    Admitted 2/14-2/16 for [facility] Hospital Course:  Patient is a 68-year-old male with past medical history of hypertension, rheumatoid arthritis on Humira who presented to hospital due to episode of dizziness, difficulty maintaining balance, slurred speech. According to the patient's wife at the bedside patient conversation was not making sense during the episode. His episode came on yesterday, he called his doctor's office who recommended him to come to the emergency department for stroke evaluation. Patient also mentions he has intermittent history of tinnitus in his right ear. Patient otherwise denied fevers chills diarrhea constipation dysuria he also denied numbness tingling sensation weakness in arms legs. MRI neg. Echo fine without shunt. Could be migraine vs. Seizure per neuro. EEG normal. Ok for d/c. Changed BP med for better control. F/u with his own cardiologist on d/c.    occ gerd at times  He states he has meclizine at home if he needs it   bp was high - changed amlodipine to nifedepine  Also had mild renal insufficiency  Seen for hospital f/u - referred to ent - getting carotid doppler end of month  Though CTA neck okay recently  Bp's 130's / 80 typically at home  Occ gets high reading eligio if pain/ lack of sleep  Mri of knee shows torn meniscus, left side - had previous meniscal tear on that knee fixed -holding on surgery right now  RFA planned on lumbar spine in 2 weeks- had relief from this  Hasn't scheduled ENT yet  Dizzyness last summer in ER - presyncope -stood up -saw stars  This most recent time different - sat on stairs and got dizzy - laid back and everything spinning - -no LOC - couldn't walk very well - threw up - took meclizine ? If helped some.   No further episodes since hospital stay  Reviewed MRI/ CTA. Gets tinnitus at times -not frequent - like crickets. Wears compression stockings regularly so swelling not bad  Wegovy helping - needs refill  Celexa working well  BP Readings from Last 3 Encounters:   03/16/23 130/76   02/24/23 (!) 140/78   02/16/23 (!) 155/95     Pulse Readings from Last 3 Encounters:   03/16/23 71   02/24/23 77   02/16/23 77     Wt Readings from Last 3 Encounters:   03/16/23 267 lb (121.1 kg)   02/24/23 274 lb (124.3 kg)   02/16/23 267 lb 13.7 oz (121.5 kg)            Patient Active Problem List    Diagnosis Date Noted    Dizziness 02/14/2023    Insomnia 08/17/2021    Anxiety 03/19/2021    Elevated liver enzymes 11/01/2019    Low testosterone 11/01/2019    Increased intraocular pressure     PMR (polymyalgia rheumatica) (HCC) 08/03/2018    Temporal arteritis ([address] 08/03/2018    Lipoma of anterior chest wall 06/28/2018    Testosterone deficiency 04/06/2017    Gilbert syndrome 12/27/2016    Multiple renal cysts 12/27/2016    MONISHA (obstructive sleep apnea)     Vitamin D deficiency 06/03/2014    Diverticula of colon 03/28/2014    Colon polyps 03/28/2014    Hypercholesteremia 01/27/2014    Hypertension 01/27/2014    Chronic rhinitis 01/27/2014    Migraine 01/27/2014     Past Medical History:   Diagnosis Date    Arthritis     rheumatoid    Asthma     Colon polyps 06/21/2018    COLONOSCOPY, DR CARTER GARCIA, CECUM POLYP TUBULAR ADENOMAS. Gastritis 06/21/2018    EGD, DR CARTER GARCIA, MILD CHRONIC GASTRITIS. GERD (gastroesophageal reflux disease)     Hyperlipidemia     Hyperplastic rectal polyp 06/21/2018    COLONOSCOPY, DR CARTER GARCIA, HYPERPLASTIC POLYP RECTAL.     Hypertension     Increased intraocular pressure     Migraine     Pneumonia     x 3    Sleep apnea     Testosterone deficiency 4/6/2017     Past Surgical History:   Procedure Laterality Date    CHEST WALL RESECTION Left 1/21/2022    REMOVAL OF 4 CENTIMETER LIPOMA: LEFT UPPER CHEST performed by Gianna Hernandes MD at [facility], LAPAROSCOPIC  8-11-11    COLONOSCOPY  06/21/2018    COLONOSCOPY, DR CARTER GARCIA, COLON POLYPS X2, RECTAL POLYP X1.     KNEE ARTHROSCOPY Bilateral     KNEE SURGERY Right 12/07/2020    meniscus repair    NASAL SEPTUM SURGERY      and palatoplasty     Admission on 02/14/2023, Discharged on 02/16/2023   Component Date Value Ref Range Status    Ventricular Rate 02/14/2023 69  BPM Final    Atrial Rate 02/14/2023 69  BPM Final    P-R Interval 02/14/2023 190  ms Final    QRS Duration 02/14/2023 82  ms Final    Q-T Interval 02/14/2023 412  ms Final    QTc Calculation (Bazett) 02/14/2023 441  ms Final    P Axis 02/14/2023 55  degrees Final    R Axis 02/14/2023 -11  degrees Final    T Axis 02/14/2023 47  degrees Final    Diagnosis 02/14/2023 Normal sinus rhythmNormal ECGWhen compared with ECG of 18-AUG-2011 18:24,No significant change was foundConfirmed by Sandra Queen MD, [facility] ([id number]) on 2/14/2023 4:42:40 PM   Final    WBC 02/14/2023 4.5  4.0 - 11.0 K/uL Final    RBC 02/14/2023 4.52  4.20 - 5.90 M/uL Final    Hemoglobin 02/14/2023 14.2  13.5 - 17.5 g/dL Final    Hematocrit 02/14/2023 42.6  40.5 - 52.5 % Final    MCV 02/14/2023 94.2  80.0 - 100.0 fL Final    MCH 02/14/2023 31.4  26.0 - 34.0 pg Final    MCHC 02/14/2023 33.3  31.0 - 36.0 g/dL Final    RDW 02/14/2023 13.8  12.4 - 15.4 % Final    Platelets 34/54/4067 261  135 - 450 K/uL Final    MPV 02/14/2023 7.1  5.0 - 10.5 fL Final    Neutrophils % 02/14/2023 57.3  % Final    Lymphocytes % 02/14/2023 23.8  % Final    Monocytes % 02/14/2023 15.6  % Final    Eosinophils % 02/14/2023 2.3  % Final    Basophils % 02/14/2023 1.0  % Final    Neutrophils Absolute 02/14/2023 2.6  1.7 - 7.7 K/uL Final    Lymphocytes Absolute 02/14/2023 1.1  1.0 - 5.1 K/uL Final    Monocytes Absolute 02/14/2023 0.7  0.0 - 1.3 K/uL Final    Eosinophils Absolute 02/14/2023 0.1  0.0 - 0.6 K/uL Final    Basophils Absolute 02/14/2023 0.0  0.0 - 0.2 K/uL Final    Sodium 02/14/2023 138  136 - 145 mmol/L Final    Potassium reflex Magnesium 02/14/2023 4.0  3.5 - 5.1 mmol/L Final    Chloride 02/14/2023 102  99 - 110 mmol/L Final    CO2 02/14/2023 27  21 - 32 mmol/L Final    Anion Gap 02/14/2023 9  3 - 16 Final    Glucose 02/14/2023 91  70 - 99 mg/dL Final    BUN 02/14/2023 11  7 - 20 mg/dL Final    Creatinine 02/14/2023 1.3  0.9 - 1.3 mg/dL Final    Est, Glom Filt Rate 02/14/2023 >60  >60 Final    Comment: Pediatric calculator link  [place].[email or web address]. [email or web address]/[email or web address]/gfr_calculatorped  Effective Oct 3, 2022  These results are not intended for use in patients  <25years of age. eGFR results are calculated without  a race factor using the 2021 CKD-EPI equation. Careful  clinical correlation is recommended, particularly when  comparing to results calculated using previous equations. The CKD-EPI equation is less accurate in patients with  extremes of muscle mass, extra-renal metabolism of  creatinine, excessive creatinine ingestion, or following  therapy that affects renal tubular secretion. Calcium 02/14/2023 9.5  8.3 - 10.6 mg/dL Final    Total Protein 02/14/2023 6.8  6.4 - 8.2 g/dL Final    Albumin 02/14/2023 4.2  3.4 - 5.0 g/dL Final    Albumin/Globulin Ratio 02/14/2023 1.6  1.1 - 2.2 Final    Total Bilirubin 02/14/2023 1.0  0.0 - 1.0 mg/dL Final    Alkaline Phosphatase 02/14/2023 69  40 - 129 U/L Final    ALT 02/14/2023 27  10 - 40 U/L Final    AST 02/14/2023 27  15 - 37 U/L Final    Troponin 02/14/2023 <0.01  <0.01 ng/mL Final    Methodology by Troponin T    Protime 02/14/2023 13.3  11.7 - 14.5 sec Final    Comment: Effective 5-25-22 09:00am EST  Please note reference ranges have  changed for PT and INR Testing. INR 02/14/2023 1.02  0.87 - 1.14 Final    Comment: Effective 5/25/22 at 09:00am EST    Normal: 0.87 - 1.14  Therapeutic: 2.0 - 3.0  Pros.  Valve: 2.5 - 3.5  AMI: 2.0 - 3.0      Amphetamine Screen, Urine 02/14/2023 Neg  Negative <1000ng/mL Final    Barbiturate Screen, Ur 02/14/2023 Neg  Negative <200 ng/mL Final    Benzodiazepine Screen, Urine 02/14/2023 Neg  Negative <200 ng/mL Final    Cannabinoid Scrn, Ur 02/14/2023 Neg  Negative <50 ng/mL Final    Cocaine Metabolite Screen, Urine 02/14/2023 Neg  Negative <300 ng/mL Final    Opiate Scrn, Ur 02/14/2023 Neg  Negative <300 ng/mL Final    Comment: \"Therapeutic levels of pain medication, especially oxycontin and synthetic  opioids, may not be detected by this Methodology. Pain management screen  panel  Drug panel-PM-Hi Res Ur, Interp (PAIN) should be considered for drug  monitoring \". PCP Screen, Urine 02/14/2023 Neg  Negative <25 ng/mL Final    Methadone Screen, Urine 02/14/2023 Neg  Negative <300 ng/mL Final    Oxycodone Urine 02/14/2023 Neg  Negative <100 ng/ml Final    FENTANYL SCREEN, URINE 02/14/2023 Neg  Negative <5 ng/mL Final    pH, UA 02/14/2023 7.0   Final    Comment: Urine pH less than 5.0 or greater than 8.0 may indicate sample adulteration. Another sample should be collected if clinically  indicated. Drug Screen Comment: 02/14/2023 see below   Final    Comment: This method is a screening test to detect only these drug  classes as part of a medical workup. Confirmatory testing  by another method should be ordered if clinically indicated. Pro-BNP 02/14/2023 9  0 - 124 pg/mL Final    Comment: Methodology by NT-proBNP    An age-independent cutoff point of 300 pg/ml has a 98%  negative predictive value excluding acute heart failure. Values exceeding the age-related cutoff values (450 pg/mL if  age<50, 900 if 50-75 and 1800 if >75) has 90% sensitivity and  84% specificity for diagnosing acute HF. In patients with  renal compromise (eGFR<60) values greater than 1200pg/ml have  a diagnostic sensitivity and specificity of 89% and 72% for  acute HF.       Rapid Influenza A Ag 02/14/2023 Negative  Negative Final    Rapid Influenza B Ag 02/14/2023 Negative  Negative Final    SARS-CoV-2, NAAT 02/14/2023 Not Detected  Not Detected Final    Comment: Rapid NAAT:   Negative results should be treated as presumptive and,  if inconsistent with clinical signs and symptoms or necessary for  patient management, should be tested with an alternative molecular  assay. Negative results do not preclude SARS-CoV-2 infection and  should not be used as the sole basis for patient management decisions. This test has been authorized by the FDA under an Emergency Use  Authorization (EUA) for use by authorized laboratories. Fact sheet for Healthcare Providers:  http://www.lv-camilla.soo/  Fact sheet for Patients: http://www.lv-camilla.binino/    METHODOLOGY: Isothermal Nucleic Acid Amplification      Sodium 02/15/2023 139  136 - 145 mmol/L Final    Potassium reflex Magnesium 02/15/2023 4.1  3.5 - 5.1 mmol/L Final    Chloride 02/15/2023 103  99 - 110 mmol/L Final    CO2 02/15/2023 24  21 - 32 mmol/L Final    Anion Gap 02/15/2023 12  3 - 16 Final    Glucose 02/15/2023 130 (A)  70 - 99 mg/dL Final    BUN 02/15/2023 19  7 - 20 mg/dL Final    Creatinine 02/15/2023 1.5 (A)  0.9 - 1.3 mg/dL Final    Est, Glom Filt Rate 02/15/2023 55 (A)  >60 Final    Comment: Pediatric calculator link  [place].at. org/professionals/kdoqi/gfr_calculatorped  Effective Oct 3, 2022  These results are not intended for use in patients  <25years of age. eGFR results are calculated without  a race factor using the 2021 CKD-EPI equation. Careful  clinical correlation is recommended, particularly when  comparing to results calculated using previous equations. The CKD-EPI equation is less accurate in patients with  extremes of muscle mass, extra-renal metabolism of  creatinine, excessive creatinine ingestion, or following  therapy that affects renal tubular secretion.       Calcium 02/15/2023 9.5  8.3 - 10.6 mg/dL Final    WBC 02/15/2023 11.1 (A)  4.0 - 11.0 K/uL Final    RBC 02/15/2023 4.31  4.20 - 5.90 M/uL Final    Hemoglobin 02/15/2023 14.1  13.5 - 17.5 g/dL Final    Hematocrit 02/15/2023 39.6 (A)  40.5 - 52.5 % Final    MCV 02/15/2023 92.0  80.0 - 100.0 fL Final    MCH 02/15/2023 32.6  26.0 - 34.0 pg Final    MCHC 02/15/2023 35.5  31.0 - 36.0 g/dL Final    RDW 02/15/2023 13.8  12.4 - 15.4 % Final    Platelets 28/13/4084 276  135 - 450 K/uL Final    MPV 02/15/2023 7.3  5.0 - 10.5 fL Final    Protime 02/15/2023 14.0  11.7 - 14.5 sec Final    Comment: Effective 5-25-22 09:00am EST  Please note reference ranges have  changed for PT and INR Testing. INR 02/15/2023 1.09  0.87 - 1.14 Final    Comment: Effective 5/25/22 at 09:00am EST    Normal: 0.87 - 1.14  Therapeutic: 2.0 - 3.0  Pros. Valve: 2.5 - 3.5  AMI: 2.0 - 3.0      Hemoglobin A1C 02/15/2023 5.1  See comment % Final    Comment: Comment:  Diagnosis of Diabetes: > or = 6.5%  Increased risk of diabetes (Prediabetes): 5.7-6.4%  Glycemic Control: Nonpregnant Adults: <7.0%                    Pregnant: <6.0%        eAG 02/15/2023 99.7  mg/dL Final    Cholesterol, Total 02/15/2023 171  0 - 199 mg/dL Final    Triglycerides 02/15/2023 53  0 - 150 mg/dL Final    HDL 02/15/2023 61 (A)  40 - 60 mg/dL Final    Comment: An HDL cholesterol less than 40 mg/dL is low and  constitutes a coronary heart disease risk factor. An HDL cholesterol greater than 60 mg/dL is a  negative risk factor for coronary heart disease.       LDL Calculated 02/15/2023 99  <100 mg/dL Final    VLDL Cholesterol Calculated 02/15/2023 11  Not Established mg/dL Final    Left Ventricular Ejection Fraction 02/15/2023 58   Final-Edited    LVEF MODALITY 02/15/2023 ECHO   Final-Edited    Sodium 02/16/2023 141  136 - 145 mmol/L Final    Potassium reflex Magnesium 02/16/2023 4.0  3.5 - 5.1 mmol/L Final    Chloride 02/16/2023 107  99 - 110 mmol/L Final    CO2 02/16/2023 26  21 - 32 mmol/L Final    Anion Gap 02/16/2023 8  3 - 16 Final    Glucose 02/16/2023 113 (A)  70 - 99 mg/dL Final    BUN 02/16/2023 14  7 - 20 mg/dL Final    Creatinine 02/16/2023 1.3  0.9 - 1.3 mg/dL Final    Est, Glom Filt Rate 02/16/2023 >60  >60 Final    Comment: Pediatric calculator link  [place].at. org/[email or web address]/gfr_calculatorped  Effective Oct 3, 2022  These results are not intended for use in patients  <25years of age. eGFR results are calculated without  a race factor using the 2021 CKD-EPI equation. Careful  clinical correlation is recommended, particularly when  comparing to results calculated using previous equations. The CKD-EPI equation is less accurate in patients with  extremes of muscle mass, extra-renal metabolism of  creatinine, excessive creatinine ingestion, or following  therapy that affects renal tubular secretion.       Calcium 02/16/2023 8.7  8.3 - 10.6 mg/dL Final    WBC 02/16/2023 8.5  4.0 - 11.0 K/uL Final    RBC 02/16/2023 3.95 (A)  4.20 - 5.90 M/uL Final    Hemoglobin 02/16/2023 12.8 (A)  13.5 - 17.5 g/dL Final    Hematocrit 02/16/2023 37.2 (A)  40.5 - 52.5 % Final    MCV 02/16/2023 94.3  80.0 - 100.0 fL Final    MCH 02/16/2023 32.5  26.0 - 34.0 pg Final    MCHC 02/16/2023 34.4  31.0 - 36.0 g/dL Final    RDW 02/16/2023 13.5  12.4 - 15.4 % Final    Platelets 80/13/6650 227  135 - 450 K/uL Final    MPV 02/16/2023 7.5  5.0 - 10.5 fL Final     Family History   Problem Relation Age of Onset    High Blood Pressure Mother     Cancer Father         testicular, multiple myeloma    Heart Attack Father 47        CABG 4V    Other Father         MRSA    No Known Problems Brother     Early Death Maternal Uncle      Current Outpatient Medications   Medication Sig Dispense Refill    HUMIRA PEN 40 MG/0.4ML PNKT       NIFEdipine (PROCARDIA XL) 30 MG extended release tablet Take 2 tablets by mouth daily 30 tablet 3    rosuvastatin (CRESTOR) 20 MG tablet Take 1 tablet by mouth daily 30 tablet 5    citalopram (CELEXA) 40 MG tablet Take 1 tablet by mouth daily 30 tablet 5    olmesartan (BENICAR) 40 MG tablet TAKE 1 TABLET BY MOUTH DAILY 90 tablet 1    Semaglutide-Weight Management (WEGOVY) 2.4 MG/0.75ML SOAJ SC injection INJECT 2.4MG INTO THE SKIN EVERY 7 DAYS 9 mL 1    ondansetron (ZOFRAN ODT) 8 MG TBDP disintegrating tablet Take 1 tablet by mouth every 8 hours as needed for Nausea 20 tablet 2    cetirizine (ZYRTEC) 10 MG tablet Take 1 tablet by mouth daily 30 tablet 5    hydroxychloroquine (PLAQUENIL) 200 MG tablet Take 200 mg by mouth 2 times daily      Cholecalciferol (VITAMIN D) 2000 units TABS tablet Take 1 tablet by mouth daily After finishing 12 week couse 30 tablet 11    aspirin (ASPIRIN LOW DOSE) 81 MG tablet Take 1 tablet by mouth daily 365 tablet 0     No current facility-administered medications for this visit. Allergies   Allergen Reactions    Dye  [Barium-Containing Compounds]      Other reaction(s): sneezing    Iodine Other (See Comments)     IVP dye reaction. Could not stop sneezing. Used a second time, and took benadryl prior and no issues. Lisinopril Other (See Comments)     Cough         Review of Systems    Objective:  Ht 6' 1\" (1.854 m)   BMI 36.15 kg/m²     BP Readings from Last 3 Encounters:   02/24/23 (!) 140/78   02/16/23 (!) 155/95   12/16/22 130/86       Pulse Readings from Last 3 Encounters:   02/24/23 77   02/16/23 77   12/16/22 82       Wt Readings from Last 3 Encounters:   02/24/23 274 lb (124.3 kg)   02/16/23 267 lb 13.7 oz (121.5 kg)   12/16/22 272 lb (123.4 kg)       Physical Exam  Constitutional:       General: He is not in acute distress. Appearance: He is well-developed. Eyes:      General: No scleral icterus. Conjunctiva/sclera: Conjunctivae normal.   Cardiovascular:      Rate and Rhythm: Normal rate and regular rhythm. Heart sounds: Normal heart sounds. No murmur heard. No gallop. Pulmonary:      Effort: Pulmonary effort is normal. No respiratory distress. Breath sounds: Normal breath sounds. No wheezing, rhonchi or rales. Abdominal:      General: Bowel sounds are normal. There is no distension. Palpations: Abdomen is soft. Tenderness: There is no abdominal tenderness. Skin:     Findings: No erythema or rash. Neurological:      Mental Status: He is alert. Assessment/Plan:      Diagnosis Orders   1. Essential hypertension        2. Severe obesity (BMI 35.0-39. 9) with comorbidity ([address]        3. PMR (polymyalgia rheumatica) (HCC)        4. Vitamin D deficiency        5. Primary hypertension        6. Hypercholesteremia        7. Chronic rhinitis        8. Low testosterone        9. Elevated liver enzymes        10. Testosterone deficiency        11. 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        Diet/ activity dw/ pt   Wegovy 2.4 weekly working well - refill done  Bp seems better on present regimen - nifedepine xl 60, benicar 40 daily - consider bumping nifedepine 60-90 based on periodic bp elevations, but continue for now and monitor - has spironolactone at home to add for high bp but frequent urination limits routine use  Wegovy for weight loss  Plaquenil/ humira per rheum - doing well - f/u routinely  Celexa for mood 40mg daily  Cont crestor 40 daily  On vit d and asa daily  Cpap for monisha  Labs reviewed 2/23 - gfr back to baseline cr -1.3, hgb 12.8 w/ good lipid and a1c 5.1%  F/u ent soon  Carotid doppler soon  ARTHUR in 2 weeks for back  Sx'atic tx for meniscal tear left knee  King Gama MD, MD  3/16/2023  10:07 AM

## 2023-03-29 ENCOUNTER — HOSPITAL ENCOUNTER (OUTPATIENT)
Dept: VASCULAR LAB | Age: 58
Discharge: HOME | End: 2023-03-29
Payer: COMMERCIAL

## 2023-03-29 DIAGNOSIS — R42 DIZZINESS: ICD-10-CM

## 2023-03-29 PROCEDURE — 93880 EXTRACRANIAL BILAT STUDY: CPT

## 2023-04-17 RX ORDER — NIFEDIPINE 60 MG/1
TABLET, EXTENDED RELEASE ORAL
Qty: 45 TABLET | Refills: 0 | Status: SHIPPED | OUTPATIENT
Start: 2023-04-17

## 2023-04-26 ENCOUNTER — OFFICE VISIT (OUTPATIENT)
Dept: FAMILY MEDICINE CLINIC | Age: 58
End: 2023-04-26
Payer: COMMERCIAL

## 2023-04-26 ENCOUNTER — TELEPHONE (OUTPATIENT)
Dept: ADMINISTRATIVE | Age: 58
End: 2023-04-26

## 2023-04-26 ENCOUNTER — TELEPHONE (OUTPATIENT)
Dept: FAMILY MEDICINE CLINIC | Age: 58
End: 2023-04-26

## 2023-04-26 VITALS
SYSTOLIC BLOOD PRESSURE: 110 MMHG | DIASTOLIC BLOOD PRESSURE: 82 MMHG | BODY MASS INDEX: 34.85 KG/M2 | WEIGHT: 263 LBS | HEART RATE: 80 BPM | HEIGHT: 73 IN | OXYGEN SATURATION: 96 %

## 2023-04-26 DIAGNOSIS — R53.82 CHRONIC FATIGUE: ICD-10-CM

## 2023-04-26 DIAGNOSIS — E55.9 VITAMIN D DEFICIENCY: ICD-10-CM

## 2023-04-26 DIAGNOSIS — M35.3 PMR (POLYMYALGIA RHEUMATICA) (HCC): ICD-10-CM

## 2023-04-26 DIAGNOSIS — G93.31 POST VIRAL SYNDROME: Primary | ICD-10-CM

## 2023-04-26 LAB
25(OH)D3 SERPL-MCNC: 29.4 NG/ML
BASOPHILS # BLD: 0.1 K/UL (ref 0–0.2)
BASOPHILS NFR BLD: 1 %
CRP SERPL-MCNC: <3 MG/L (ref 0–5.1)
DEPRECATED RDW RBC AUTO: 14.2 % (ref 12.4–15.4)
EOSINOPHIL # BLD: 0.1 K/UL (ref 0–0.6)
EOSINOPHIL NFR BLD: 1 %
ERYTHROCYTE [SEDIMENTATION RATE] IN BLOOD BY WESTERGREN METHOD: 17 MM/HR (ref 0–20)
HCT VFR BLD AUTO: 42.5 % (ref 40.5–52.5)
HGB BLD-MCNC: 14.7 G/DL (ref 13.5–17.5)
LYMPHOCYTES # BLD: 1 K/UL (ref 1–5.1)
LYMPHOCYTES NFR BLD: 17.5 %
MCH RBC QN AUTO: 33 PG (ref 26–34)
MCHC RBC AUTO-ENTMCNC: 34.7 G/DL (ref 31–36)
MCV RBC AUTO: 95.2 FL (ref 80–100)
MONOCYTES # BLD: 0.6 K/UL (ref 0–1.3)
MONOCYTES NFR BLD: 11 %
NEUTROPHILS # BLD: 4.1 K/UL (ref 1.7–7.7)
NEUTROPHILS NFR BLD: 69.5 %
PLATELET # BLD AUTO: 249 K/UL (ref 135–450)
PMV BLD AUTO: 7.5 FL (ref 5–10.5)
RBC # BLD AUTO: 4.47 M/UL (ref 4.2–5.9)
WBC # BLD AUTO: 5.9 K/UL (ref 4–11)

## 2023-04-26 PROCEDURE — 3074F SYST BP LT 130 MM HG: CPT | Performed by: NURSE PRACTITIONER

## 2023-04-26 PROCEDURE — 3079F DIAST BP 80-89 MM HG: CPT | Performed by: NURSE PRACTITIONER

## 2023-04-26 PROCEDURE — 99213 OFFICE O/P EST LOW 20 MIN: CPT | Performed by: NURSE PRACTITIONER

## 2023-04-26 RX ORDER — CELECOXIB 200 MG/1
200 CAPSULE ORAL 2 TIMES DAILY
Qty: 180 CAPSULE | Refills: 1 | Status: SHIPPED | OUTPATIENT
Start: 2023-04-26

## 2023-04-26 ASSESSMENT — ENCOUNTER SYMPTOMS
SHORTNESS OF BREATH: 0
COUGH: 0
WHEEZING: 0

## 2023-04-26 NOTE — TELEPHONE ENCOUNTER
Submitted PA for CELEBREX  [facility] [employer] Key: [id number]  STATUS: APPROVED. LETTER ATTACHED. If this requires a response please respond to the pool. [address]). Please advise patient thank you.

## 2023-04-26 NOTE — PROGRESS NOTES
Marjo Goldmann (:  1965) is a 56 y.o. male,Established patient, here for evaluation of the following chief complaint(s):  Fatigue (FATIGUE, DIZZINESS AND MUSCLE PAIN )      ASSESSMENT/PLAN:  1. Post viral syndrome  -Presentation is consistent with postviral fatigue syndrome. We will order other labs to rule out other possible causes. The patient also has a follow-up scheduled on May 9 with rheumatology. Can further evaluate with them as symptoms are persistent.  -     CBC with Auto Differential; Future  -     C-Reactive Protein; Future  -     Sedimentation Rate; Future  2. PMR (polymyalgia rheumatica) ([facility])  -Checking labs to rule out other causes of fatigue. The patient has a follow-up scheduled on May 9 with rheumatology. Inquired about change in anti-inflammatory. We will change the Celebrex. Educated on medication use and side effects. Can follow-up with rheumatology if still having issues. -     celecoxib (CELEBREX) 200 MG capsule; Take 1 capsule by mouth 2 times daily, Disp-180 capsule, R-1Normal  3. Vitamin D deficiency  -Has been deficient in the past.  Recheck as a possible contributor to fatigue. -     Vitamin D 25 Hydroxy; Future  4. Chronic fatigue  -Presentation is consistent with postviral fatigue syndrome. We will order other labs to rule out other possible causes. The patient also has a follow-up scheduled on May 9 with rheumatology. Can further evaluate with them as symptoms are persistent.  -     CBC with Auto Differential; Future  -     C-Reactive Protein; Future  -     Sedimentation Rate; Future  -     Testosterone, free, total; Future  -     Vitamin D 25 Hydroxy; Future      Return if symptoms worsen or fail to improve. SUBJECTIVE/OBJECTIVE:  TAYLOR  Joao An presents today for evaluation of fatigue, body aches, and general malaise. He states he has had this for the past few weeks. States that he initially had an RA flare before trip to [place].   While having the RA

## 2023-04-26 NOTE — PATIENT INSTRUCTIONS

## 2023-04-28 ENCOUNTER — PATIENT MESSAGE (OUTPATIENT)
Dept: FAMILY MEDICINE CLINIC | Age: 58
End: 2023-04-28

## 2023-04-28 LAB
SHBG SERPL-SCNC: 26 NMOL/L (ref 11–80)
TESTOST FREE SERPL-MCNC: 45.3 PG/ML (ref 47–244)
TESTOST SERPL-MCNC: 208 NG/DL (ref 220–1000)

## 2023-04-28 NOTE — TELEPHONE ENCOUNTER
From: Marjo Goldmann  To: Cameron Esposito  Sent: 4/28/2023 3:19 PM EDT  Subject: Question regarding TESTOSTERONE TOTAL FREE    Is there a specific time frame between tests that I would need? Also, I believe it was low once before when Dr. Denver Alvarado checked it a few years ago, but I don't remember exactly what we did. But I know that it could be contributing to my fatigue.  So I would like to get it taken care of

## 2023-05-02 ENCOUNTER — PATIENT MESSAGE (OUTPATIENT)
Dept: FAMILY MEDICINE CLINIC | Age: 58
End: 2023-05-02

## 2023-05-02 NOTE — TELEPHONE ENCOUNTER
From: Ross Miranda  To: Richarddio Jarrell  Sent: 5/2/2023 3:26 PM EDT  Subject: Sugey, I just spoke with my orthopedic surgeon and I'm going to have another surgery on my left knee to repair a torn meniscus. It is currently scheduled for May 18th, but they asked that I have a physical and an EKG prior. Is there any way that yourself or Dr. Sis Smith could address those issues for me? Thank you!

## 2023-05-04 ENCOUNTER — OFFICE VISIT (OUTPATIENT)
Dept: FAMILY MEDICINE CLINIC | Age: 58
End: 2023-05-04
Payer: COMMERCIAL

## 2023-05-04 VITALS
WEIGHT: 266.4 LBS | SYSTOLIC BLOOD PRESSURE: 124 MMHG | BODY MASS INDEX: 35.31 KG/M2 | HEART RATE: 83 BPM | DIASTOLIC BLOOD PRESSURE: 78 MMHG | OXYGEN SATURATION: 95 % | RESPIRATION RATE: 16 BRPM | TEMPERATURE: 97.9 F | HEIGHT: 73 IN

## 2023-05-04 DIAGNOSIS — R79.89 LOW TESTOSTERONE: ICD-10-CM

## 2023-05-04 DIAGNOSIS — M23.307 DEGENERATIVE TEAR OF MENISCUS OF LEFT KNEE: ICD-10-CM

## 2023-05-04 DIAGNOSIS — Z01.818 PREOP EXAMINATION: ICD-10-CM

## 2023-05-04 DIAGNOSIS — Z00.01 ANNUAL VISIT FOR GENERAL ADULT MEDICAL EXAMINATION WITH ABNORMAL FINDINGS: Primary | ICD-10-CM

## 2023-05-04 LAB
ALBUMIN SERPL-MCNC: 4.4 G/DL (ref 3.4–5)
ALBUMIN/GLOB SERPL: 2 {RATIO} (ref 1.1–2.2)
ALP SERPL-CCNC: 74 U/L (ref 40–129)
ALT SERPL-CCNC: 36 U/L (ref 10–40)
ANION GAP SERPL CALCULATED.3IONS-SCNC: 9 MMOL/L (ref 3–16)
AST SERPL-CCNC: 23 U/L (ref 15–37)
BILIRUB SERPL-MCNC: 1.2 MG/DL (ref 0–1)
BUN SERPL-MCNC: 13 MG/DL (ref 7–20)
CALCIUM SERPL-MCNC: 9.4 MG/DL (ref 8.3–10.6)
CHLORIDE SERPL-SCNC: 105 MMOL/L (ref 99–110)
CO2 SERPL-SCNC: 27 MMOL/L (ref 21–32)
CREAT SERPL-MCNC: 1.2 MG/DL (ref 0.9–1.3)
GFR SERPLBLD CREATININE-BSD FMLA CKD-EPI: >60 ML/MIN/{1.73_M2}
GLUCOSE SERPL-MCNC: 83 MG/DL (ref 70–99)
POTASSIUM SERPL-SCNC: 4.6 MMOL/L (ref 3.5–5.1)
PROT SERPL-MCNC: 6.6 G/DL (ref 6.4–8.2)
SODIUM SERPL-SCNC: 141 MMOL/L (ref 136–145)

## 2023-05-04 PROCEDURE — 99396 PREV VISIT EST AGE 40-64: CPT | Performed by: NURSE PRACTITIONER

## 2023-05-04 PROCEDURE — 36415 COLL VENOUS BLD VENIPUNCTURE: CPT | Performed by: NURSE PRACTITIONER

## 2023-05-04 PROCEDURE — 93000 ELECTROCARDIOGRAM COMPLETE: CPT | Performed by: NURSE PRACTITIONER

## 2023-05-04 PROCEDURE — 3078F DIAST BP <80 MM HG: CPT | Performed by: NURSE PRACTITIONER

## 2023-05-04 PROCEDURE — 3074F SYST BP LT 130 MM HG: CPT | Performed by: NURSE PRACTITIONER

## 2023-05-04 NOTE — PATIENT INSTRUCTIONS
You may receive a survey regarding the care you received during your visit. Your input is valuable to us. We encourage you to complete and return your survey. We hope you will choose us in the future for your healthcare needs. GENERAL OFFICE POLICIES      Telephone Calls: Messages will be answered within 1-2 business days, unless the provider is out of the office. If it is urgent a covering provider will answer. (this does not include Medication refills). MyChart: We recommend all patients sign up for [employer]. Through this portal you can see your lab results, request refills, schedule appointments, pay your bill and send messages to the office. [employer] messages will be answered within 1-2 business days unless the provider is out of the office. For urgent matters, please call the office. Appointments:  All appointments must be scheduled. We ask all patients to schedule their next follow up appointment before they leave the office to make sure you will be able to be seen before you run out of medications. 24 hours notice is required to cancel or reschedule an appointment to avoid being marked as a no show. You may be dismissed from the practice after 3 no shows. LATE for Appointment: If you are 15 or more minutes late for your appointment, you may be asked to reschedule. MA/LAB APPTS: Must be scheduled, cannot accept walk in lab visits. We only draw labs for patients established in our office. We only do injections for medications ordered by our office. Acute Sick Visits:  Nothing other than acute complaint will be addressed at this visit. TRADITIONAL MEDICARE  DOES NOT COVER PHYSICALS  MEDICARE WELLNESS VISITS: These are NOT physicals but the free annual visit offered by Medicare to discuss wellness issues. Medication refills, checkups, etc. will not be addressed during this visit.   Medication Refills: Refills are handled electronically so please contact your pharmacy for medication

## 2023-05-04 NOTE — PROGRESS NOTES
Fasting labs drawn LA/mv  2 sst
glucose, liver function studies  2. Change in medication regimen before surgery: Discontinue NSAIDs (naproxen) 7 days before surgery  3. Prophylaxis for cardiac events with perioperative beta-blockers: Not indicated  ACC/AHA indications for pre-operative beta-blocker use:    Vascular surgery with history of postitive stress test  Intermediate or high risk surgery with history of CAD   Intermediate or high risk surgery with multiple clinical predictors of CAD- 2 of the following: history of compensated or prior heart failure, history of cerebrovascular disease, DM, or renal insufficiency    Routine administration of higher-dose, long-acting metoprolol in beta-blocker-naïve patients on the day of surgery, and in the absence of dose titration is associated with an overall increase in mortality. Beta-blockers should be started days to weeks prior to surgery and titrated to pulse < 70.  4. Deep vein thrombosis prophylaxis: regimen to be chosen by surgical team  5. No contraindications to planned surgery assuming cardiology clearance. They have requested that his EKG be faxed to their office. This was faxed today. - EKG 12 Lead    4. Low testosterone  -Noted low testosterone on previous check. Patient would like to have rechecked to pursue treatment. Order placed. - Testosterone, free, total; Future  - Testosterone, free, total       In addition to the patient's annual physical, an additional 40 minutes were dedicated to preop examination. This included evaluation of symptoms, history and physical, and completion of paperwork for the surgical team.    This dictation was generated by voice recognition computer software. Although all attempts are made to edit the dictation for accuracy, there may be errors in the transcription that are not intended. An electronic signature was used to authenticate this note.     --Marylene Phenes, APRN - CNP

## 2023-05-06 LAB
SHBG SERPL-SCNC: 26 NMOL/L (ref 11–80)
TESTOST FREE SERPL-MCNC: 57.9 PG/ML (ref 47–244)
TESTOST SERPL-MCNC: 261 NG/DL (ref 220–1000)

## 2023-05-11 ENCOUNTER — TELEPHONE (OUTPATIENT)
Dept: ADMINISTRATIVE | Age: 58
End: 2023-05-11

## 2023-05-18 ENCOUNTER — HOSPITAL ENCOUNTER (OUTPATIENT)
Age: 58
Setting detail: OUTPATIENT SURGERY
Discharge: HOME | End: 2023-05-18
Attending: ORTHOPAEDIC SURGERY | Admitting: ORTHOPAEDIC SURGERY
Payer: COMMERCIAL

## 2023-05-18 ENCOUNTER — ANESTHESIA EVENT (OUTPATIENT)
Dept: OPERATING ROOM | Age: 58
End: 2023-05-18
Payer: COMMERCIAL

## 2023-05-18 ENCOUNTER — ANESTHESIA (OUTPATIENT)
Dept: OPERATING ROOM | Age: 58
End: 2023-05-18
Payer: COMMERCIAL

## 2023-05-18 VITALS
HEART RATE: 61 BPM | HEIGHT: 73 IN | RESPIRATION RATE: 14 BRPM | OXYGEN SATURATION: 96 % | TEMPERATURE: 97.3 F | BODY MASS INDEX: 34.54 KG/M2 | SYSTOLIC BLOOD PRESSURE: 150 MMHG | WEIGHT: 260.6 LBS | DIASTOLIC BLOOD PRESSURE: 82 MMHG

## 2023-05-18 DIAGNOSIS — M23.232 DERANGEMENT OF OTHER MEDIAL MENISCUS DUE TO OLD TEAR OR INJURY, LEFT KNEE: Primary | ICD-10-CM

## 2023-05-18 PROCEDURE — 7100000001 HC PACU RECOVERY - ADDTL 15 MIN: Performed by: ORTHOPAEDIC SURGERY

## 2023-05-18 PROCEDURE — 2580000003 HC RX 258: Performed by: ORTHOPAEDIC SURGERY

## 2023-05-18 PROCEDURE — 6360000002 HC RX W HCPCS: Performed by: ORTHOPAEDIC SURGERY

## 2023-05-18 PROCEDURE — 7100000000 HC PACU RECOVERY - FIRST 15 MIN: Performed by: ORTHOPAEDIC SURGERY

## 2023-05-18 PROCEDURE — 3700000001 HC ADD 15 MINUTES (ANESTHESIA): Performed by: ORTHOPAEDIC SURGERY

## 2023-05-18 PROCEDURE — 3700000000 HC ANESTHESIA ATTENDED CARE: Performed by: ORTHOPAEDIC SURGERY

## 2023-05-18 PROCEDURE — 3600000014 HC SURGERY LEVEL 4 ADDTL 15MIN: Performed by: ORTHOPAEDIC SURGERY

## 2023-05-18 PROCEDURE — 7100000011 HC PHASE II RECOVERY - ADDTL 15 MIN: Performed by: ORTHOPAEDIC SURGERY

## 2023-05-18 PROCEDURE — 2709999900 HC NON-CHARGEABLE SUPPLY: Performed by: ORTHOPAEDIC SURGERY

## 2023-05-18 PROCEDURE — 6360000002 HC RX W HCPCS: Performed by: ANESTHESIOLOGY

## 2023-05-18 PROCEDURE — 3600000004 HC SURGERY LEVEL 4 BASE: Performed by: ORTHOPAEDIC SURGERY

## 2023-05-18 PROCEDURE — 6360000002 HC RX W HCPCS: Performed by: NURSE ANESTHETIST, CERTIFIED REGISTERED

## 2023-05-18 PROCEDURE — 2580000003 HC RX 258: Performed by: NURSE ANESTHETIST, CERTIFIED REGISTERED

## 2023-05-18 PROCEDURE — 2500000003 HC RX 250 WO HCPCS: Performed by: ORTHOPAEDIC SURGERY

## 2023-05-18 PROCEDURE — 6370000000 HC RX 637 (ALT 250 FOR IP): Performed by: ANESTHESIOLOGY

## 2023-05-18 PROCEDURE — 7100000010 HC PHASE II RECOVERY - FIRST 15 MIN: Performed by: ORTHOPAEDIC SURGERY

## 2023-05-18 PROCEDURE — 2500000003 HC RX 250 WO HCPCS: Performed by: NURSE ANESTHETIST, CERTIFIED REGISTERED

## 2023-05-18 RX ORDER — FAMOTIDINE 10 MG/ML
INJECTION, SOLUTION INTRAVENOUS PRN
Status: DISCONTINUED | OUTPATIENT
Start: 2023-05-18 | End: 2023-05-18 | Stop reason: SDUPTHER

## 2023-05-18 RX ORDER — MEPERIDINE HYDROCHLORIDE 25 MG/ML
12.5 INJECTION INTRAMUSCULAR; INTRAVENOUS; SUBCUTANEOUS EVERY 5 MIN PRN
Status: DISCONTINUED | OUTPATIENT
Start: 2023-05-18 | End: 2023-05-18 | Stop reason: HOSPADM

## 2023-05-18 RX ORDER — NALOXONE HYDROCHLORIDE 0.4 MG/ML
INJECTION, SOLUTION INTRAMUSCULAR; INTRAVENOUS; SUBCUTANEOUS
Status: DISCONTINUED
Start: 2023-05-18 | End: 2023-05-18 | Stop reason: HOSPADM

## 2023-05-18 RX ORDER — SODIUM CHLORIDE, SODIUM LACTATE, POTASSIUM CHLORIDE, CALCIUM CHLORIDE 600; 310; 30; 20 MG/100ML; MG/100ML; MG/100ML; MG/100ML
INJECTION, SOLUTION INTRAVENOUS CONTINUOUS
Status: DISCONTINUED | OUTPATIENT
Start: 2023-05-18 | End: 2023-05-18 | Stop reason: HOSPADM

## 2023-05-18 RX ORDER — SODIUM CHLORIDE, SODIUM LACTATE, POTASSIUM CHLORIDE, AND CALCIUM CHLORIDE .6; .31; .03; .02 G/100ML; G/100ML; G/100ML; G/100ML
IRRIGANT IRRIGATION PRN
Status: DISCONTINUED | OUTPATIENT
Start: 2023-05-18 | End: 2023-05-18 | Stop reason: HOSPADM

## 2023-05-18 RX ORDER — SODIUM CHLORIDE 0.9 % (FLUSH) 0.9 %
5-40 SYRINGE (ML) INJECTION EVERY 12 HOURS SCHEDULED
Status: DISCONTINUED | OUTPATIENT
Start: 2023-05-18 | End: 2023-05-18 | Stop reason: HOSPADM

## 2023-05-18 RX ORDER — SODIUM CHLORIDE 0.9 % (FLUSH) 0.9 %
5-40 SYRINGE (ML) INJECTION PRN
Status: DISCONTINUED | OUTPATIENT
Start: 2023-05-18 | End: 2023-05-18 | Stop reason: HOSPADM

## 2023-05-18 RX ORDER — GLYCOPYRROLATE 1 MG/5 ML
SYRINGE (ML) INTRAVENOUS PRN
Status: DISCONTINUED | OUTPATIENT
Start: 2023-05-18 | End: 2023-05-18 | Stop reason: SDUPTHER

## 2023-05-18 RX ORDER — SODIUM CHLORIDE, SODIUM LACTATE, POTASSIUM CHLORIDE, CALCIUM CHLORIDE 600; 310; 30; 20 MG/100ML; MG/100ML; MG/100ML; MG/100ML
INJECTION, SOLUTION INTRAVENOUS CONTINUOUS PRN
Status: DISCONTINUED | OUTPATIENT
Start: 2023-05-18 | End: 2023-05-18 | Stop reason: SDUPTHER

## 2023-05-18 RX ORDER — BUPIVACAINE HYDROCHLORIDE 5 MG/ML
INJECTION, SOLUTION EPIDURAL; INTRACAUDAL PRN
Status: DISCONTINUED | OUTPATIENT
Start: 2023-05-18 | End: 2023-05-18 | Stop reason: HOSPADM

## 2023-05-18 RX ORDER — HYDROCODONE BITARTRATE AND ACETAMINOPHEN 5; 325 MG/1; MG/1
1 TABLET ORAL EVERY 4 HOURS PRN
Qty: 42 TABLET | Refills: 0 | Status: SHIPPED | OUTPATIENT
Start: 2023-05-18 | End: 2023-05-25

## 2023-05-18 RX ORDER — FENTANYL CITRATE 50 UG/ML
INJECTION, SOLUTION INTRAMUSCULAR; INTRAVENOUS PRN
Status: DISCONTINUED | OUTPATIENT
Start: 2023-05-18 | End: 2023-05-18 | Stop reason: SDUPTHER

## 2023-05-18 RX ORDER — FAMOTIDINE 10 MG/ML
20 INJECTION, SOLUTION INTRAVENOUS 2 TIMES DAILY
Status: DISCONTINUED | OUTPATIENT
Start: 2023-05-18 | End: 2023-05-18 | Stop reason: HOSPADM

## 2023-05-18 RX ORDER — HYDRALAZINE HYDROCHLORIDE 20 MG/ML
10 INJECTION INTRAMUSCULAR; INTRAVENOUS
Status: DISCONTINUED | OUTPATIENT
Start: 2023-05-18 | End: 2023-05-18 | Stop reason: HOSPADM

## 2023-05-18 RX ORDER — DEXAMETHASONE SODIUM PHOSPHATE 4 MG/ML
INJECTION, SOLUTION INTRA-ARTICULAR; INTRALESIONAL; INTRAMUSCULAR; INTRAVENOUS; SOFT TISSUE PRN
Status: DISCONTINUED | OUTPATIENT
Start: 2023-05-18 | End: 2023-05-18 | Stop reason: SDUPTHER

## 2023-05-18 RX ORDER — LABETALOL HYDROCHLORIDE 5 MG/ML
10 INJECTION, SOLUTION INTRAVENOUS
Status: DISCONTINUED | OUTPATIENT
Start: 2023-05-18 | End: 2023-05-18 | Stop reason: HOSPADM

## 2023-05-18 RX ORDER — OXYCODONE HYDROCHLORIDE 5 MG/1
5 TABLET ORAL
Status: COMPLETED | OUTPATIENT
Start: 2023-05-18 | End: 2023-05-18

## 2023-05-18 RX ORDER — PROPOFOL 10 MG/ML
INJECTION, EMULSION INTRAVENOUS PRN
Status: DISCONTINUED | OUTPATIENT
Start: 2023-05-18 | End: 2023-05-18 | Stop reason: SDUPTHER

## 2023-05-18 RX ORDER — ONDANSETRON 2 MG/ML
INJECTION INTRAMUSCULAR; INTRAVENOUS PRN
Status: DISCONTINUED | OUTPATIENT
Start: 2023-05-18 | End: 2023-05-18 | Stop reason: SDUPTHER

## 2023-05-18 RX ORDER — ONDANSETRON 2 MG/ML
4 INJECTION INTRAMUSCULAR; INTRAVENOUS
Status: DISCONTINUED | OUTPATIENT
Start: 2023-05-18 | End: 2023-05-18 | Stop reason: HOSPADM

## 2023-05-18 RX ORDER — LIDOCAINE HYDROCHLORIDE 10 MG/ML
1 INJECTION, SOLUTION EPIDURAL; INFILTRATION; INTRACAUDAL; PERINEURAL
Status: DISCONTINUED | OUTPATIENT
Start: 2023-05-18 | End: 2023-05-18 | Stop reason: HOSPADM

## 2023-05-18 RX ORDER — SODIUM CHLORIDE 9 MG/ML
INJECTION, SOLUTION INTRAVENOUS PRN
Status: DISCONTINUED | OUTPATIENT
Start: 2023-05-18 | End: 2023-05-18 | Stop reason: HOSPADM

## 2023-05-18 RX ORDER — PROCHLORPERAZINE EDISYLATE 5 MG/ML
5 INJECTION INTRAMUSCULAR; INTRAVENOUS
Status: DISCONTINUED | OUTPATIENT
Start: 2023-05-18 | End: 2023-05-18 | Stop reason: HOSPADM

## 2023-05-18 RX ORDER — APREPITANT 40 MG/1
40 CAPSULE ORAL ONCE
Status: COMPLETED | OUTPATIENT
Start: 2023-05-18 | End: 2023-05-18

## 2023-05-18 RX ORDER — MIDAZOLAM HYDROCHLORIDE 1 MG/ML
INJECTION INTRAMUSCULAR; INTRAVENOUS PRN
Status: DISCONTINUED | OUTPATIENT
Start: 2023-05-18 | End: 2023-05-18 | Stop reason: SDUPTHER

## 2023-05-18 RX ADMIN — SODIUM CHLORIDE, SODIUM LACTATE, POTASSIUM CHLORIDE, AND CALCIUM CHLORIDE: .6; .31; .03; .02 INJECTION, SOLUTION INTRAVENOUS at 13:09

## 2023-05-18 RX ADMIN — PROPOFOL 150 MG: 10 INJECTION, EMULSION INTRAVENOUS at 13:14

## 2023-05-18 RX ADMIN — PHENYLEPHRINE HYDROCHLORIDE 200 MCG: 10 INJECTION, SOLUTION INTRAMUSCULAR; INTRAVENOUS; SUBCUTANEOUS at 13:28

## 2023-05-18 RX ADMIN — OXYCODONE HYDROCHLORIDE 5 MG: 5 TABLET ORAL at 16:08

## 2023-05-18 RX ADMIN — ONDANSETRON 4 MG: 2 INJECTION INTRAMUSCULAR; INTRAVENOUS at 13:22

## 2023-05-18 RX ADMIN — FENTANYL CITRATE 100 MCG: 50 INJECTION, SOLUTION INTRAMUSCULAR; INTRAVENOUS at 13:09

## 2023-05-18 RX ADMIN — DEXAMETHASONE SODIUM PHOSPHATE 4 MG: 4 INJECTION, SOLUTION INTRAMUSCULAR; INTRAVENOUS at 13:17

## 2023-05-18 RX ADMIN — MIDAZOLAM HYDROCHLORIDE 2 MG: 2 INJECTION, SOLUTION INTRAMUSCULAR; INTRAVENOUS at 13:09

## 2023-05-18 RX ADMIN — APREPITANT 40 MG: 40 CAPSULE ORAL at 13:00

## 2023-05-18 RX ADMIN — SODIUM CHLORIDE, SODIUM LACTATE, POTASSIUM CHLORIDE, AND CALCIUM CHLORIDE: .6; .31; .03; .02 INJECTION, SOLUTION INTRAVENOUS at 13:55

## 2023-05-18 RX ADMIN — FAMOTIDINE 20 MG: 10 INJECTION, SOLUTION INTRAVENOUS at 13:17

## 2023-05-18 RX ADMIN — Medication 0.4 MG: at 13:24

## 2023-05-18 RX ADMIN — CEFAZOLIN 3000 MG: 10 INJECTION, POWDER, FOR SOLUTION INTRAVENOUS at 13:09

## 2023-05-18 ASSESSMENT — PAIN SCALES - GENERAL
PAINLEVEL_OUTOF10: 3
PAINLEVEL_OUTOF10: 0
PAINLEVEL_OUTOF10: 5

## 2023-05-18 ASSESSMENT — PAIN DESCRIPTION - LOCATION: LOCATION: THROAT

## 2023-05-18 ASSESSMENT — PAIN - FUNCTIONAL ASSESSMENT
PAIN_FUNCTIONAL_ASSESSMENT: ACTIVITIES ARE NOT PREVENTED
PAIN_FUNCTIONAL_ASSESSMENT: 0-10

## 2023-05-18 ASSESSMENT — LIFESTYLE VARIABLES: SMOKING_STATUS: 0

## 2023-05-18 ASSESSMENT — PAIN DESCRIPTION - DESCRIPTORS
DESCRIPTORS: SORE
DESCRIPTORS: ACHING

## 2023-05-18 NOTE — PROGRESS NOTES
Ambulatory Surgery/Procedure Discharge Note    Vitals:    05/18/23 1604   BP: (!) 150/82   Pulse: 61   Resp: 14   Temp: 97.3 °F (36.3 °C)   SpO2:        In: 1510 [P.O.:310; I.V.:1200]  Out: 400 [Urine:400]    Restroom use offered before discharge. Yes  Pt is toileted via urinal. Alert and ambulating the room. Discharge instructions are read at bedside. IV one strike through. Reinforced the dressing and applied pressure. Ace bandage to affected knee remains intact. Pain assessment:  present - adequately treated  Pain Level: 5        Patient discharged to home/self care.  Patient discharged via wheel chair by this nurse to waiting family/S.O.       5/18/2023 4:19 PM

## 2023-05-18 NOTE — OP NOTE
PREOPERATIVE DIAGNOSIS:    Left knee medial meniscus tear   Right knee chondromalacia    POSTOPERATIVE DIAGNOSES:  Left knee medial meniscus tear   Right knee chondromalacia    OPERATIONS PERFORMED:  Left knee arthroscopic partial medial meniscectomy   Right knee corticosteroid injection    ATTENDING SURGEON:  Oracio Crowder MD    ASSISTANT: Gilberto Thurman is a board certified physician assistant who is medically necessary as a first assistant in the operating room with me. He is responsible for assisting with positioning of the patient,retraction,cauterization ,manipulation of the involved extremity and triangulaton of the arthroscopic equipment to allow for optimal visualization. His presence in the operating room with me as a first assistant during procedures enables me to perform the surgical procedure in a more timely and efficient manner. The [facility] does not provide me with a first assistant in the operating room who has the same surgical skills as my physician assistant. ANESTHESIA:  General and local    TOURNIQUET TIME:  26 minutes at 275 mmHg. ESTIMATED BLOOD LOSS:  minimal.    OPERATIVE INDICATIONS:  This is patient who presented to clinic with progressively worsening knee pain and mechanical symptoms. Upon MRI evaluation, they were found to have the above noted diagnoses. Based on their pain which was refractory to conservative measures, and mechanical symptoms, I offered the patient the above noted procedures. We discussed the risks and benefits of surgery in detail. After full discussion of the risks and benefits of surgery, the patient elected to proceed. Additionally due to increasing pain in the right knee he requested an injection of cortisone. OPERATIVE DETAILS:  The patient was greeted in the preoperative holding area. The operative knee was marked with a marker. They were brought to the operating room, where general anesthesia was obtained.      First the

## 2023-05-18 NOTE — ANESTHESIA POSTPROCEDURE EVALUATION
Department of Anesthesiology  Postprocedure Note    Patient: Brielle Jaramillo  MRN: [id number]  YOB: 1965  Date of evaluation: 5/18/2023      Procedure Summary     Date: 05/18/23 Room / Location: [address]    Anesthesia Start: 0450 Anesthesia Stop: 7667    Procedure: LEFT KNEE ARTHROSCOPY WITH PARTIAL MEDIAL MENISCECTOMY (Left: Knee) Diagnosis:       Other tear of medial meniscus of left knee, unspecified whether old or current tear, initial encounter      (Other tear of medial meniscus of left knee, unspecified whether old or current tear, initial encounter [D96.014J])    Surgeons: Pawel Wang MD Responsible Provider: Mackenzie Vang MD    Anesthesia Type: MAC ASA Status: 2          Anesthesia Type: No value filed.     Roseline Phase I: Roseline Score: 10    Roseline Phase II:        Anesthesia Post Evaluation    Patient location during evaluation: PACU  Patient participation: complete - patient participated  Level of consciousness: awake and alert  Airway patency: patent  Nausea & Vomiting: no nausea and no vomiting  Complications: no  Cardiovascular status: hemodynamically stable  Respiratory status: acceptable  Hydration status: euvolemic  Multimodal analgesia pain management approach

## 2023-05-18 NOTE — H&P
Update History & Physical    The patient's History and Physical was reviewed with the patient and I examined the patient. There was no change. The surgical site was confirmed by the patient and me. Plan: The risks, benefits, expected outcome, and alternative to the recommended procedure have been discussed with the patient. Patient understands and wants to proceed with the procedure.      Fito Pantoja MD  5/18/2023

## 2023-05-18 NOTE — ANESTHESIA PRE PROCEDURE
Name:  Ama Gerard  : 1965  Age:  56 y. o. MRN:  [id number]  Date: 2023           Surgeon: Surgeon(s):  Jo Cain MD    Procedure: Procedure(s):  LEFT KNEE ARTHROSCOPY WITH PARTIAL MEDIAL MENISCECTOMY     Allergies   Allergen Reactions    Dye  [Barium-Containing Compounds]      Other reaction(s): sneezing    Iodine Other (See Comments)     IVP dye reaction. Could not stop sneezing. Used a second time, and took benadryl prior and no issues.  Lisinopril Other (See Comments)     Cough       Patient Active Problem List   Diagnosis    Hypercholesteremia    Hypertension    Chronic rhinitis    Migraine    Diverticula of colon    Colon polyps    Vitamin D deficiency    MONISHA (obstructive sleep apnea)    Gilbert syndrome    Multiple renal cysts    Testosterone deficiency    Lipoma of anterior chest wall    PMR (polymyalgia rheumatica) (HCC)    Temporal arteritis (HCC)    Increased intraocular pressure    Elevated liver enzymes    Low testosterone    Anxiety    Insomnia    Dizziness     Past Medical History:   Diagnosis Date    Arthritis     rheumatoid    Asthma     Colon polyps 2018    COLONOSCOPY, DR CARTER GARCIA, CECUM POLYP TUBULAR ADENOMAS.  Gastritis 2018    EGD, DR CARTER GARCIA, MILD CHRONIC GASTRITIS.  GERD (gastroesophageal reflux disease)     Hyperlipidemia     Hyperplastic rectal polyp 2018    COLONOSCOPY, DR CARTER GARCIA, HYPERPLASTIC POLYP RECTAL.     Hypertension     Increased intraocular pressure     Migraine     Pneumonia     x 3    Sleep apnea     Testosterone deficiency 2017     Past Surgical History:   Procedure Laterality Date    CHEST WALL RESECTION Left 2022    REMOVAL OF 4 CENTIMETER LIPOMA: LEFT UPPER CHEST performed by Bulmaro Krishna MD at [facility], LAPAROSCOPIC  11    COLONOSCOPY  2018

## 2023-05-18 NOTE — PROGRESS NOTES
PACU Transfer to [facility]    Vitals:    05/18/23 1530   BP: (!) 150/82   Pulse: 79   Resp: 12   Temp: 97.3 °F (36.3 °C)   SpO2: 95%         Intake/Output Summary (Last 24 hours) at 5/18/2023 1549  Last data filed at 5/18/2023 1530  Gross per 24 hour   Intake 1510 ml   Output --   Net 1510 ml       Pain assessment:  present - adequately treated  Pain Level:  (denies need)    Patient transferred to care of SIERRA RN.    5/18/2023 3:49 PM

## 2023-05-18 NOTE — DISCHARGE INSTRUCTIONS
Outpatient Discharge Instructions    Elevate operative extremity to decrease swelling. Swelling is normal post-operatively. You may loosen your ACE bandage if dressing is too tight    You may shower, however, keep incisions covered. Your incisions have been closed with absorbable sutures under the skin, and steri strips on top of the skin. Leave these in place, do not remove. You may remove your dressing in 3 days, at which point water can run over the incisions, and pat dry. Please call for an appointment to be seen in 7-14 days if you do not already have a post-operative appointment. If your lower extremity was operated on: You may bear weight: as tolerated    Use crutches as needed for ambulation    To prevent blood clots postoperatively, mobilize frequently, and take 81 mg aspirin once a day for the next 4 weeks. Narcotic pain medication can cause constipation, so use stool softeners as need and drink plenty of water. Call office with any questions [phone]    ELECTRONICALLY SIGNED BY: Merrick Zamora, 9/17/2020    [address] [facility] AMBULATORY PROCEDURE DISCHARGE INSTRUCTIONS    There are potential side effects of anesthesia or sedation you may experience for the first 24 hours. These side effects include:    Confusion or Memory loss, Dizziness, or Delayed Reaction Times   [x]A responsible person should be with you for the next 24 hours. Do not operate any vehicles (automobiles, bicycles, motorcycles) or power tools or machinery for 24 hours. Do not sign any legal documents or make any legal decisions for 24 hours. Do not drink alcohol for 24 hours or while taking narcotic pain medication. Nausea    [x]Start with light diet and progress to your normal diet as you feel like eating. However, if you experience nausea or repeated episodes of vomiting which persist beyond 12-24 hours, notify your physician. Once nausea has passed, remember to keep drinking fluids.     Difficulty

## 2023-06-04 ENCOUNTER — PATIENT MESSAGE (OUTPATIENT)
Dept: FAMILY MEDICINE CLINIC | Age: 58
End: 2023-06-04

## 2023-06-05 NOTE — TELEPHONE ENCOUNTER
From: Darling Puri  To: Dr. Zeynep Bardales: 6/4/2023 3:00 AM EDT  Subject: Stephie Flores is it possible to do a PA for a 90 day wegovy script, please and thank you.

## 2023-06-06 NOTE — TELEPHONE ENCOUNTER
Please clarify if he wants to stay at 2.4mg dose or bump to 3mg dose (max dose)  Load rx and I will sign

## 2023-06-07 RX ORDER — SEMAGLUTIDE 2.4 MG/.75ML
2.4 INJECTION, SOLUTION SUBCUTANEOUS
Qty: 9 ML | Refills: 0 | Status: SHIPPED | OUTPATIENT
Start: 2023-06-07

## 2023-07-31 RX ORDER — NIFEDIPINE 60 MG/1
TABLET, EXTENDED RELEASE ORAL
Qty: 90 TABLET | Refills: 0 | Status: SHIPPED | OUTPATIENT
Start: 2023-07-31

## 2023-07-31 RX ORDER — NIFEDIPINE 60 MG/1
TABLET, EXTENDED RELEASE ORAL
Qty: 45 TABLET | Refills: 0 | Status: SHIPPED | OUTPATIENT
Start: 2023-07-31 | End: 2023-07-31

## 2023-07-31 RX ORDER — CITALOPRAM 40 MG/1
40 TABLET ORAL DAILY
Qty: 30 TABLET | Refills: 5 | Status: SHIPPED | OUTPATIENT
Start: 2023-07-31

## 2023-09-13 ENCOUNTER — OFFICE VISIT (OUTPATIENT)
Dept: FAMILY MEDICINE CLINIC | Age: 58
End: 2023-09-13
Payer: COMMERCIAL

## 2023-09-13 VITALS
SYSTOLIC BLOOD PRESSURE: 124 MMHG | OXYGEN SATURATION: 96 % | HEART RATE: 61 BPM | BODY MASS INDEX: 34.33 KG/M2 | WEIGHT: 259 LBS | HEIGHT: 73 IN | DIASTOLIC BLOOD PRESSURE: 82 MMHG

## 2023-09-13 DIAGNOSIS — E66.09 CLASS 1 OBESITY DUE TO EXCESS CALORIES WITH SERIOUS COMORBIDITY AND BODY MASS INDEX (BMI) OF 34.0 TO 34.9 IN ADULT: ICD-10-CM

## 2023-09-13 DIAGNOSIS — R79.89 LOW TESTOSTERONE: ICD-10-CM

## 2023-09-13 DIAGNOSIS — E55.9 VITAMIN D DEFICIENCY: ICD-10-CM

## 2023-09-13 DIAGNOSIS — M35.3 PMR (POLYMYALGIA RHEUMATICA) (HCC): ICD-10-CM

## 2023-09-13 DIAGNOSIS — I10 ESSENTIAL HYPERTENSION: ICD-10-CM

## 2023-09-13 DIAGNOSIS — R68.89 FORGETFULNESS: ICD-10-CM

## 2023-09-13 DIAGNOSIS — R53.82 CHRONIC FATIGUE: Primary | ICD-10-CM

## 2023-09-13 DIAGNOSIS — R11.0 NAUSEA: ICD-10-CM

## 2023-09-13 DIAGNOSIS — E78.00 HYPERCHOLESTEREMIA: ICD-10-CM

## 2023-09-13 PROBLEM — E66.811 CLASS 1 OBESITY DUE TO EXCESS CALORIES WITH SERIOUS COMORBIDITY AND BODY MASS INDEX (BMI) OF 34.0 TO 34.9 IN ADULT: Status: ACTIVE | Noted: 2018-10-22

## 2023-09-13 PROCEDURE — 3074F SYST BP LT 130 MM HG: CPT | Performed by: NURSE PRACTITIONER

## 2023-09-13 PROCEDURE — 99214 OFFICE O/P EST MOD 30 MIN: CPT | Performed by: NURSE PRACTITIONER

## 2023-09-13 PROCEDURE — 3079F DIAST BP 80-89 MM HG: CPT | Performed by: NURSE PRACTITIONER

## 2023-09-13 RX ORDER — SEMAGLUTIDE 2.4 MG/.75ML
2.4 INJECTION, SOLUTION SUBCUTANEOUS
Qty: 9 ML | Refills: 1 | Status: SHIPPED | OUTPATIENT
Start: 2023-09-13

## 2023-09-13 RX ORDER — ROSUVASTATIN CALCIUM 20 MG/1
20 TABLET, COATED ORAL DAILY
Qty: 30 TABLET | Refills: 5 | Status: SHIPPED | OUTPATIENT
Start: 2023-09-13

## 2023-09-13 RX ORDER — CELECOXIB 200 MG/1
200 CAPSULE ORAL 2 TIMES DAILY
Qty: 180 CAPSULE | Refills: 1 | Status: SHIPPED | OUTPATIENT
Start: 2023-09-13

## 2023-09-13 RX ORDER — ONDANSETRON 8 MG/1
8 TABLET, ORALLY DISINTEGRATING ORAL EVERY 8 HOURS PRN
Qty: 20 TABLET | Refills: 2 | Status: SHIPPED | OUTPATIENT
Start: 2023-09-13

## 2023-09-13 RX ORDER — NIFEDIPINE 60 MG/1
60 TABLET, EXTENDED RELEASE ORAL DAILY
Qty: 90 TABLET | Refills: 1 | Status: SHIPPED | OUTPATIENT
Start: 2023-09-13

## 2023-09-13 ASSESSMENT — ENCOUNTER SYMPTOMS
EYE DISCHARGE: 0
EYE REDNESS: 0
WHEEZING: 0
SHORTNESS OF BREATH: 0
ABDOMINAL PAIN: 0
ABDOMINAL DISTENTION: 0
SORE THROAT: 0
NAUSEA: 0
SINUS PAIN: 0
COUGH: 0
VOMITING: 0
SINUS PRESSURE: 0
DIARRHEA: 0
EYE PAIN: 0

## 2023-09-13 NOTE — PROGRESS NOTES
Rui Gomez (:  1965) is a 56 y.o. male,Established patient, here for evaluation of the following chief complaint(s):  Hypertension (FOLLOW UP ON HTN )      ASSESSMENT/PLAN:  1. Chronic fatigue  -Labs are being ordered to further evaluate. Patient has had low vitamin D and low testosterone in the past.  We will also check B12 given memory issues. TSH has not been checked in multiple years. Will evaluate this as well. No additional interventions pending results. -     Testosterone, free, total; Future  -     Vitamin D 25 Hydroxy; Future  -     Vitamin B12 & Folate; Future  -     TSH with Reflex; Future  2. Forgetfulness  -Recheck B12. Continue to monitor symptoms. Consider imaging of the head if symptoms worsen. 3. Essential hypertension  -Controlled on regimen. No changes today. Refill. Follow-up in 6 months. Will be due for labs at next appointment.  -     NIFEdipine (PROCARDIA XL) 60 MG extended release tablet; Take 1 tablet by mouth daily, Disp-90 tablet, R-1**Patient requests 90 days supply**Normal  4. Class 1 obesity due to excess calories with serious comorbidity and body mass index (BMI) of 34.0 to 34.9 in adult  -Controlled on regimen. No changes today. Refill. Follow-up in 6 months. Will be due for labs at next appointment. 5. Low testosterone  -Has been low in the past.  Recheck to evaluate fatigue. Consider intervention based upon results. -     Testosterone, free, total; Future  6. Vitamin D deficiency  -Low with last check. Recheck today given persistent fatigue. -     Vitamin D 25 Hydroxy; Future  7. PMR (polymyalgia rheumatica) (HCC)  -Stable. Follows with rheumatology. Refill celecoxib. -     celecoxib (CELEBREX) 200 MG capsule; Take 1 capsule by mouth 2 times daily, Disp-180 capsule, R-1Normal  8. Hypercholesteremia  -Stable continue rosuvastatin. Check labs at next appointment. -     rosuvastatin (CRESTOR) 20 MG tablet;  Take 1 tablet by mouth daily,

## 2023-09-16 DIAGNOSIS — R79.89 LOW TESTOSTERONE: ICD-10-CM

## 2023-09-16 DIAGNOSIS — E55.9 VITAMIN D DEFICIENCY: ICD-10-CM

## 2023-09-16 DIAGNOSIS — R53.82 CHRONIC FATIGUE: ICD-10-CM

## 2023-09-16 LAB
25(OH)D3 SERPL-MCNC: 36.2 NG/ML
FOLATE SERPL-MCNC: 17.57 NG/ML (ref 4.78–24.2)
TSH SERPL DL<=0.005 MIU/L-ACNC: 2.56 UIU/ML (ref 0.27–4.2)
VIT B12 SERPL-MCNC: 1806 PG/ML (ref 211–911)

## 2023-09-18 ENCOUNTER — TELEPHONE (OUTPATIENT)
Dept: ADMINISTRATIVE | Age: 58
End: 2023-09-18

## 2023-09-18 NOTE — TELEPHONE ENCOUNTER
Submitted PA for Ondansetron 8MG dispersible tablets    Via [facility]  Key: [id number] STATUS: PENDING. Follow up done daily; if no response in three days we will refax for status check. If another three days goes by with no response we will call the insurance for status.

## 2023-09-20 LAB
SHBG SERPL-SCNC: 26 NMOL/L (ref 11–80)
TESTOST FREE SERPL-MCNC: 61 PG/ML (ref 47–244)
TESTOST SERPL-MCNC: 274 NG/DL (ref 220–1000)

## 2023-09-20 NOTE — TELEPHONE ENCOUNTER
DENIED for quantity limit. LETTER ATTACHED. Submitted PA for ONDANSETRON 8 MG DISINTEGRATING TAB FOR 24 PER 30 DAYS  Via [employer] Key: [id number] STATUS: \"Available without authorization. \"      If this requires a response please respond to the pool. [facility]). Please advise patient thank you.

## 2023-10-25 ENCOUNTER — PATIENT MESSAGE (OUTPATIENT)
Dept: FAMILY MEDICINE CLINIC | Age: 58
End: 2023-10-25

## 2023-10-25 NOTE — TELEPHONE ENCOUNTER
From: Dallas Yanes  To: Stephany Macias  Sent: 10/25/2023 9:45 AM EDT  Subject: Prostate    I have had an enlarged prostate in the past but I don't recall what Dr Devang Bauer gave me that helped alleviate it. Recently I have had frequent urination, the urge to go without producing, mild lower abdominal pain and now when I sit down it feels as of I'm sitting on a ball . Just wanted to see if you could check my file and see what we did previously. Thank you!

## 2023-10-26 ENCOUNTER — NURSE ONLY (OUTPATIENT)
Dept: FAMILY MEDICINE CLINIC | Age: 58
End: 2023-10-26
Payer: COMMERCIAL

## 2023-10-26 DIAGNOSIS — R35.0 FREQUENT URINATION: ICD-10-CM

## 2023-10-26 DIAGNOSIS — R39.14 BENIGN PROSTATIC HYPERPLASIA WITH INCOMPLETE BLADDER EMPTYING: ICD-10-CM

## 2023-10-26 DIAGNOSIS — Z23 NEED FOR IMMUNIZATION AGAINST INFLUENZA: Primary | ICD-10-CM

## 2023-10-26 DIAGNOSIS — N40.1 BENIGN PROSTATIC HYPERPLASIA WITH INCOMPLETE BLADDER EMPTYING: ICD-10-CM

## 2023-10-26 LAB
BILIRUBIN, POC: ABNORMAL
BLOOD URINE, POC: ABNORMAL
CLARITY, POC: ABNORMAL
COLOR, POC: ABNORMAL
GLUCOSE URINE, POC: ABNORMAL
KETONES, POC: ABNORMAL
LEUKOCYTE EST, POC: ABNORMAL
NITRITE, POC: ABNORMAL
PH, POC: 5.5
PROTEIN, POC: 30
SPECIFIC GRAVITY, POC: 1.03
UROBILINOGEN, POC: 0.2

## 2023-10-26 PROCEDURE — 90674 CCIIV4 VAC NO PRSV 0.5 ML IM: CPT

## 2023-10-26 PROCEDURE — 90471 IMMUNIZATION ADMIN: CPT

## 2023-10-26 PROCEDURE — 81002 URINALYSIS NONAUTO W/O SCOPE: CPT

## 2023-10-26 RX ORDER — DOXAZOSIN 8 MG/1
TABLET ORAL
Qty: 90 TABLET | Refills: 1 | Status: SHIPPED | OUTPATIENT
Start: 2023-10-26

## 2023-10-26 NOTE — PROGRESS NOTES
Immunizations Administered       Name Date Dose Route    Influenza, FLUCELVAX, (age 10 mo+), MDCK, PF, 0.5mL 10/26/2023 0.5 mL Intramuscular    Site: Deltoid- Right    Lot: [id number]    NDC: 36483-640-45            POCT UA abnormal, sending UC./mv

## 2023-10-29 DIAGNOSIS — N39.0 URINARY TRACT INFECTION WITHOUT HEMATURIA, SITE UNSPECIFIED: Primary | ICD-10-CM

## 2023-10-29 LAB
BACTERIA UR CULT: ABNORMAL
ORGANISM: ABNORMAL

## 2023-10-29 RX ORDER — SULFAMETHOXAZOLE AND TRIMETHOPRIM 800; 160 MG/1; MG/1
1 TABLET ORAL 2 TIMES DAILY
Qty: 10 TABLET | Refills: 0 | Status: SHIPPED | OUTPATIENT
Start: 2023-10-29 | End: 2023-11-03

## 2023-11-04 ENCOUNTER — E-VISIT (OUTPATIENT)
Dept: PRIMARY CARE CLINIC | Age: 58
End: 2023-11-04
Payer: COMMERCIAL

## 2023-11-04 DIAGNOSIS — J06.9 UPPER RESPIRATORY TRACT INFECTION, UNSPECIFIED TYPE: Primary | ICD-10-CM

## 2023-11-04 PROCEDURE — 99422 OL DIG E/M SVC 11-20 MIN: CPT | Performed by: NURSE PRACTITIONER

## 2023-11-04 RX ORDER — AZITHROMYCIN 250 MG/1
TABLET, FILM COATED ORAL
Qty: 1 PACKET | Refills: 0 | Status: SHIPPED | OUTPATIENT
Start: 2023-11-04

## 2023-11-04 ASSESSMENT — LIFESTYLE VARIABLES: SMOKING_STATUS: NO, I'VE NEVER SMOKED

## 2023-12-06 ENCOUNTER — PATIENT MESSAGE (OUTPATIENT)
Dept: FAMILY MEDICINE CLINIC | Age: 58
End: 2023-12-06

## 2023-12-06 DIAGNOSIS — E66.09 CLASS 1 OBESITY DUE TO EXCESS CALORIES WITH SERIOUS COMORBIDITY AND BODY MASS INDEX (BMI) OF 34.0 TO 34.9 IN ADULT: Primary | ICD-10-CM

## 2023-12-06 NOTE — TELEPHONE ENCOUNTER
From: Lisa Medina  To: [facility] [place]  Sent: 12/6/2023 11:47 AM EST  Subject: Weight loss    I have been taking wegovy for some time and seem to have leveled off, difficult to get below 250. Can we consider trying Zepbound and should I make an appointment to come in and discuss it with you ? Thank you.

## 2023-12-08 ENCOUNTER — TELEPHONE (OUTPATIENT)
Dept: ADMINISTRATIVE | Age: 58
End: 2023-12-08

## 2023-12-08 NOTE — TELEPHONE ENCOUNTER
Submitted PA for Zepbound 7.5MG/0.5ML pen-injectors  Via [facility] Key: [id number]  STATUS: PENDING. Follow up done daily; if no response in three days we will refax for status check. If another three days goes by with no response we will call the insurance for status.

## 2023-12-12 NOTE — TELEPHONE ENCOUNTER
The medication is APPROVED [facility] 07/22/2024. If this requires a response please respond to the pool ( [facility] [address]). Thank you please advise patient.

## 2024-01-04 DIAGNOSIS — G47.00 INSOMNIA, UNSPECIFIED TYPE: ICD-10-CM

## 2024-01-04 DIAGNOSIS — E66.09 CLASS 1 OBESITY DUE TO EXCESS CALORIES WITH SERIOUS COMORBIDITY AND BODY MASS INDEX (BMI) OF 34.0 TO 34.9 IN ADULT: ICD-10-CM

## 2024-01-04 RX ORDER — DOXEPIN HYDROCHLORIDE 25 MG/1
25 CAPSULE ORAL NIGHTLY
Qty: 30 CAPSULE | Refills: 3 | Status: SHIPPED | OUTPATIENT
Start: 2024-01-04

## 2024-01-05 ENCOUNTER — PATIENT MESSAGE (OUTPATIENT)
Dept: FAMILY MEDICINE CLINIC | Age: 59
End: 2024-01-05

## 2024-01-05 DIAGNOSIS — Z12.5 SCREENING FOR MALIGNANT NEOPLASM OF PROSTATE: ICD-10-CM

## 2024-01-05 DIAGNOSIS — E55.9 VITAMIN D DEFICIENCY: Primary | ICD-10-CM

## 2024-01-05 DIAGNOSIS — R53.82 CHRONIC FATIGUE: ICD-10-CM

## 2024-01-05 DIAGNOSIS — R74.8 ELEVATED VITAMIN B12 LEVEL: ICD-10-CM

## 2024-01-08 NOTE — TELEPHONE ENCOUNTER
From: Jeovany Anthony  To: Alcides Villegas  Sent: 1/5/2024 5:50 PM EST  Subject: Labwork     Sir, just wanted to know if we could do a PSA (free & total) and check my B12 and Vitamin D along with a CBC. If I'm not due I can make an office appointment if needed for follow up from my last issue. Thank you.

## 2024-01-09 DIAGNOSIS — R53.82 CHRONIC FATIGUE: ICD-10-CM

## 2024-01-09 DIAGNOSIS — R74.8 ELEVATED VITAMIN B12 LEVEL: ICD-10-CM

## 2024-01-09 DIAGNOSIS — E55.9 VITAMIN D DEFICIENCY: ICD-10-CM

## 2024-01-09 DIAGNOSIS — Z12.5 SCREENING FOR MALIGNANT NEOPLASM OF PROSTATE: ICD-10-CM

## 2024-01-09 LAB
25(OH)D3 SERPL-MCNC: 32.5 NG/ML
BASOPHILS # BLD: 0 K/UL (ref 0–0.2)
BASOPHILS NFR BLD: 0.6 %
DEPRECATED RDW RBC AUTO: 14.1 % (ref 12.4–15.4)
EOSINOPHIL # BLD: 0 K/UL (ref 0–0.6)
EOSINOPHIL NFR BLD: 0.7 %
FOLATE SERPL-MCNC: 19.62 NG/ML (ref 4.78–24.2)
HCT VFR BLD AUTO: 42.3 % (ref 40.5–52.5)
HGB BLD-MCNC: 14.6 G/DL (ref 13.5–17.5)
LYMPHOCYTES # BLD: 1.7 K/UL (ref 1–5.1)
LYMPHOCYTES NFR BLD: 27.6 %
MCH RBC QN AUTO: 33.4 PG (ref 26–34)
MCHC RBC AUTO-ENTMCNC: 34.4 G/DL (ref 31–36)
MCV RBC AUTO: 96.9 FL (ref 80–100)
MONOCYTES # BLD: 0.9 K/UL (ref 0–1.3)
MONOCYTES NFR BLD: 15.7 %
NEUTROPHILS # BLD: 3.3 K/UL (ref 1.7–7.7)
NEUTROPHILS NFR BLD: 55.4 %
PLATELET # BLD AUTO: 254 K/UL (ref 135–450)
PMV BLD AUTO: 8 FL (ref 5–10.5)
PSA SERPL DL<=0.01 NG/ML-MCNC: 4.23 NG/ML (ref 0–4)
RBC # BLD AUTO: 4.37 M/UL (ref 4.2–5.9)
VIT B12 SERPL-MCNC: 1752 PG/ML (ref 211–911)
WBC # BLD AUTO: 6 K/UL (ref 4–11)

## 2024-01-10 ENCOUNTER — PATIENT MESSAGE (OUTPATIENT)
Dept: FAMILY MEDICINE CLINIC | Age: 59
End: 2024-01-10

## 2024-01-10 DIAGNOSIS — R97.20 ELEVATED PSA: Primary | ICD-10-CM

## 2024-01-10 NOTE — TELEPHONE ENCOUNTER
From: Jeovany Anthony  To: Alcides Villegas  Sent: 1/10/2024 11:10 AM EST  Subject: PSA    Yes, I would appreciate a referral for the urologist. I also wanted to advise that I have not taken any B12 supplements for several months

## 2024-01-11 ENCOUNTER — TELEPHONE (OUTPATIENT)
Dept: ADMINISTRATIVE | Age: 59
End: 2024-01-11

## 2024-01-11 NOTE — TELEPHONE ENCOUNTER
Submitted PA for Wegovy 2.4MG/0.75ML auto-injectors  Via [facility] Key: [id number] STATUS: PENDING.    Follow up done daily; if no decision with in three days we will refax.  If another three days goes by with no decision will call the insurance for status.

## 2024-01-17 ENCOUNTER — TELEPHONE (OUTPATIENT)
Dept: FAMILY MEDICINE CLINIC | Age: 59
End: 2024-01-17

## 2024-01-17 ENCOUNTER — TELEPHONE (OUTPATIENT)
Dept: ADMINISTRATIVE | Age: 59
End: 2024-01-17

## 2024-01-17 RX ORDER — SEMAGLUTIDE 2.4 MG/.75ML
2.4 INJECTION, SOLUTION SUBCUTANEOUS
Qty: 9 ML | Refills: 1 | Status: SHIPPED | OUTPATIENT
Start: 2024-01-17

## 2024-01-17 NOTE — TELEPHONE ENCOUNTER
Weight and BMI     Weight - Scale BMI (Calculated)   2/14/2023 121.7 kg  35.5 kg/m2     122.8 kg  35.8 kg/m2    2/15/2023 121.7 kg  35.5 kg/m2    2/16/2023 121.5 kg  35.4 kg/m2    2/24/2023 124.286 kg  36.2 kg/m2    3/16/2023 121.11 kg  35.3 kg/m2    4/26/2023 119.296 kg  34.8 kg/m2    5/4/2023 120.838 kg  35.2 kg/m2    5/18/2023 118.207 kg  34.5 kg/m2    9/13/2023 117.482 kg  34.2 kg/m2      SEE WEIGHT LOSS OVER LAST YEAR.KW

## 2024-01-17 NOTE — TELEPHONE ENCOUNTER
RESubmitted PA for WEGOVY 2.4MG/0.75ML PEN INJECTOR  Via [facility] Key: [id number] STATUS: PENDING.    Follow up done daily; if no decision with in three days we will refax.  If another three days goes by with no decision will call the insurance for status.

## 2024-01-17 NOTE — TELEPHONE ENCOUNTER
Submitted PA for Zepbound 7.5MG/0.5ML pen-injectors  Via [facility] Key: [id number] STATUS:  \"Available without authorization.\"    If this requires a response please respond to the pool. (P [facility]IC[facility] Pre-Auth).    Please advise patient thank you.

## 2024-01-17 NOTE — TELEPHONE ENCOUNTER
Looks like they will approve if we have proof of 5% weight loss documented.  Hopefully sending his weights over the past few years will be sufficient.  He has lost 5% of his body weight while on the medication.  He was on it over the past year.

## 2024-01-24 NOTE — TELEPHONE ENCOUNTER
Dr. Edelmira Owens talking with patient with family at bedside. I called and spoke with Addy (JAN) and she said that this was denied due to the patient not losing 5% of weight. I told her he lost MORE than 5% of his weight. She said then I need to appeal.    FAXED APPEAL LETTER WITH CLINICALS. EXPLAINED IT IS DOCUMENTED HIS WEIGHT ON THERE.

## 2024-01-26 ENCOUNTER — HOSPITAL ENCOUNTER (OUTPATIENT)
Dept: CT IMAGING | Age: 59
Discharge: HOME | End: 2024-01-26
Attending: UROLOGY
Payer: COMMERCIAL

## 2024-01-26 DIAGNOSIS — A49.9 BACTERIAL INFECTION, UNSPECIFIED: ICD-10-CM

## 2024-01-26 DIAGNOSIS — R35.0 FREQUENCY OF MICTURITION: ICD-10-CM

## 2024-01-26 DIAGNOSIS — N39.0 URINARY TRACT INFECTION WITHOUT HEMATURIA, SITE UNSPECIFIED: ICD-10-CM

## 2024-01-26 DIAGNOSIS — R97.20 ELEVATED PROSTATE SPECIFIC ANTIGEN (PSA): ICD-10-CM

## 2024-01-26 DIAGNOSIS — R35.1 NOCTURIA: ICD-10-CM

## 2024-01-26 PROCEDURE — 6360000004 HC RX CONTRAST MEDICATION: Performed by: UROLOGY

## 2024-01-26 PROCEDURE — 74178 CT ABD&PLV WO CNTR FLWD CNTR: CPT | Performed by: UROLOGY

## 2024-01-26 RX ADMIN — IOPAMIDOL 75 ML: 755 INJECTION, SOLUTION INTRAVENOUS at 18:00

## 2024-02-05 ENCOUNTER — TELEPHONE (OUTPATIENT)
Dept: FAMILY MEDICINE CLINIC | Age: 59
End: 2024-02-05

## 2024-02-05 DIAGNOSIS — R11.2 NAUSEA AND VOMITING, UNSPECIFIED VOMITING TYPE: Primary | ICD-10-CM

## 2024-02-05 NOTE — TELEPHONE ENCOUNTER
Pt called in for an appointment or a referral to GI for dark brown vomit with ulcer hx.  Referral to GI pended, appt made if needed

## 2024-02-09 DIAGNOSIS — Z20.828 EXPOSURE TO INFLUENZA: Primary | ICD-10-CM

## 2024-02-09 RX ORDER — OSELTAMIVIR PHOSPHATE 75 MG/1
75 CAPSULE ORAL DAILY
Qty: 7 CAPSULE | Refills: 0 | Status: SHIPPED | OUTPATIENT
Start: 2024-02-09 | End: 2024-02-16

## 2024-02-12 DIAGNOSIS — J11.1 INFLUENZA: Primary | ICD-10-CM

## 2024-02-12 RX ORDER — OSELTAMIVIR PHOSPHATE 75 MG/1
75 CAPSULE ORAL 2 TIMES DAILY
Qty: 3 CAPSULE | Refills: 0 | Status: SHIPPED | OUTPATIENT
Start: 2024-02-12 | End: 2024-02-17

## 2024-03-07 ENCOUNTER — PATIENT MESSAGE (OUTPATIENT)
Dept: FAMILY MEDICINE CLINIC | Age: 59
End: 2024-03-07

## 2024-03-07 NOTE — TELEPHONE ENCOUNTER
From: Jeovany Anthony  To: Alcides Villegas  Sent: 3/7/2024 3:40 PM EST  Subject: Anti inflammatory     Sir I am currently having an RA flare which came about after a strenuous incident at work. I am taking humira weekly with the plaquenil which generally keep a cap on my flares but I generally use a prednisone taper to try and assist when I have one. I was wondering if I might be able to get a prescription for ketorolac pills which I researched ,(short supply) to use in a similar way that I do my prednisone taper. I was checking with you first but I can forward this to my my rheumatologist if you feel that would be necessary. I am also taking the celebrex twice daily but during more intense flares I have difficulty finding timely relief. Full body muscle pain, joint pain, difficulty walking or raising arms over head.  Thank you

## 2024-03-20 RX ORDER — OLMESARTAN MEDOXOMIL 40 MG/1
40 TABLET ORAL DAILY
Qty: 90 TABLET | Refills: 0 | Status: SHIPPED | OUTPATIENT
Start: 2024-03-20 | End: 2024-06-18

## 2024-03-20 RX ORDER — OLMESARTAN MEDOXOMIL 40 MG/1
40 TABLET ORAL DAILY
COMMUNITY
End: 2024-03-20 | Stop reason: SDUPTHER

## 2024-03-28 ENCOUNTER — OFFICE VISIT (OUTPATIENT)
Dept: ORTHOPEDIC SURGERY | Age: 59
End: 2024-03-28
Payer: COMMERCIAL

## 2024-03-28 VITALS — HEIGHT: 73 IN | BODY MASS INDEX: 34.33 KG/M2 | RESPIRATION RATE: 18 BRPM | WEIGHT: 259 LBS

## 2024-03-28 DIAGNOSIS — M17.0 PRIMARY OSTEOARTHRITIS OF BOTH KNEES: Primary | ICD-10-CM

## 2024-03-28 PROCEDURE — 99203 OFFICE O/P NEW LOW 30 MIN: CPT | Performed by: ORTHOPAEDIC SURGERY

## 2024-03-28 SDOH — HEALTH STABILITY: PHYSICAL HEALTH: ON AVERAGE, HOW MANY DAYS PER WEEK DO YOU ENGAGE IN MODERATE TO STRENUOUS EXERCISE (LIKE A BRISK WALK)?: 2 DAYS

## 2024-03-28 SDOH — HEALTH STABILITY: PHYSICAL HEALTH: ON AVERAGE, HOW MANY MINUTES DO YOU ENGAGE IN EXERCISE AT THIS LEVEL?: 30 MIN

## 2024-03-28 NOTE — PROGRESS NOTES
[facility]  Office Visit    Chief Complaint: Bilateral knee pain    HPI:  Jeovany Anthony is a 58 y.o. who is here for initial evaluation of bilateral knee pain.  He reports issues with both knees over the past few years, with symptoms currently worsening in the left knee.  He does have a history of 3 arthroscopy procedures in the left knee, most recently in 2023 and 1 right knee arthroscopy in 2021 or 2022 as well.  The surgeries were done by Dr. Villeda at [place]. He was previously on naproxen and now takes Celebrex to help with pain.  He had a steroid injection in the left knee 1 month ago which has mildly helped relieve the symptoms.  He also has rheumatoid arthritis and is on Plaquenil and Humira.  He was previously on high doses of prednisone but is currently off of this medication.  The prednisone caused significant weight gain.  He continues to work as a .  He has pain on a daily basis that he rates a 2-5/10 with activity.  Bending the knee worsens the pain.  There is no specific history of injury for his recent knee pain.  He denies left hip pain.      Past Medical History:   Diagnosis Date    Arthritis     rheumatoid    Asthma     Colon polyps 06/21/2018    COLONOSCOPY, DR CARTER GARCIA, CECUM POLYP TUBULAR ADENOMAS.    Gastritis 06/21/2018    EGD, DR CARTER GARCIA, MILD CHRONIC GASTRITIS.    GERD (gastroesophageal reflux disease)     Hyperlipidemia     Hyperplastic rectal polyp 06/21/2018    COLONOSCOPY, DR CARTER GARCIA, HYPERPLASTIC POLYP RECTAL.    Hypertension     Increased intraocular pressure     Migraine     Pneumonia     x 3    Sleep apnea     Testosterone deficiency 4/6/2017        ROS:  Constitutional: denies fever, chills, weight loss  MSK: denies pain in other joints, muscle aches  Neurological: denies numbness, tingling, weakness    Exam:  Resp 18   Ht 1.854 m (6' 1\")   Wt 117.5 kg (259 lb)   BMI 34.17 kg/m²

## 2024-04-02 ASSESSMENT — PATIENT HEALTH QUESTIONNAIRE - PHQ9
2. FEELING DOWN, DEPRESSED OR HOPELESS: NOT AT ALL
1. LITTLE INTEREST OR PLEASURE IN DOING THINGS: NOT AT ALL
SUM OF ALL RESPONSES TO PHQ QUESTIONS 1-9: 0
SUM OF ALL RESPONSES TO PHQ9 QUESTIONS 1 & 2: 0
SUM OF ALL RESPONSES TO PHQ QUESTIONS 1-9: 0
1. LITTLE INTEREST OR PLEASURE IN DOING THINGS: NOT AT ALL
SUM OF ALL RESPONSES TO PHQ9 QUESTIONS 1 & 2: 0
2. FEELING DOWN, DEPRESSED OR HOPELESS: NOT AT ALL

## 2024-04-03 NOTE — PATIENT INSTRUCTIONS

## 2024-04-04 ENCOUNTER — TELEPHONE (OUTPATIENT)
Dept: ADMINISTRATIVE | Age: 59
End: 2024-04-04

## 2024-04-05 ENCOUNTER — OFFICE VISIT (OUTPATIENT)
Dept: FAMILY MEDICINE CLINIC | Age: 59
End: 2024-04-05
Payer: COMMERCIAL

## 2024-04-05 ENCOUNTER — PATIENT MESSAGE (OUTPATIENT)
Dept: FAMILY MEDICINE CLINIC | Age: 59
End: 2024-04-05

## 2024-04-05 VITALS
BODY MASS INDEX: 36.18 KG/M2 | WEIGHT: 273 LBS | SYSTOLIC BLOOD PRESSURE: 136 MMHG | HEIGHT: 73 IN | HEART RATE: 100 BPM | DIASTOLIC BLOOD PRESSURE: 86 MMHG | OXYGEN SATURATION: 96 %

## 2024-04-05 DIAGNOSIS — R10.9 RIGHT LATERAL ABDOMINAL PAIN: ICD-10-CM

## 2024-04-05 DIAGNOSIS — Z23 NEED FOR TDAP VACCINATION: ICD-10-CM

## 2024-04-05 DIAGNOSIS — J01.90 ACUTE BACTERIAL SINUSITIS: ICD-10-CM

## 2024-04-05 DIAGNOSIS — M35.3 PMR (POLYMYALGIA RHEUMATICA) (HCC): ICD-10-CM

## 2024-04-05 DIAGNOSIS — M06.9 RHEUMATOID ARTHRITIS INVOLVING MULTIPLE SITES, UNSPECIFIED WHETHER RHEUMATOID FACTOR PRESENT (HCC): ICD-10-CM

## 2024-04-05 DIAGNOSIS — B96.89 ACUTE BACTERIAL SINUSITIS: ICD-10-CM

## 2024-04-05 DIAGNOSIS — F51.04 PSYCHOPHYSIOLOGIC INSOMNIA: ICD-10-CM

## 2024-04-05 DIAGNOSIS — Z00.01 ENCOUNTER FOR WELL ADULT EXAM WITH ABNORMAL FINDINGS: Primary | ICD-10-CM

## 2024-04-05 DIAGNOSIS — E66.01 SEVERE OBESITY (BMI 35.0-39.9) WITH COMORBIDITY (HCC): ICD-10-CM

## 2024-04-05 PROCEDURE — 99396 PREV VISIT EST AGE 40-64: CPT | Performed by: NURSE PRACTITIONER

## 2024-04-05 PROCEDURE — 3079F DIAST BP 80-89 MM HG: CPT | Performed by: NURSE PRACTITIONER

## 2024-04-05 PROCEDURE — 3075F SYST BP GE 130 - 139MM HG: CPT | Performed by: NURSE PRACTITIONER

## 2024-04-05 RX ORDER — HYDROCODONE BITARTRATE AND ACETAMINOPHEN 5; 325 MG/1; MG/1
TABLET ORAL
COMMUNITY
Start: 2024-02-20

## 2024-04-05 RX ORDER — AZITHROMYCIN 250 MG/1
TABLET, FILM COATED ORAL
Qty: 6 TABLET | Refills: 0 | Status: SHIPPED | OUTPATIENT
Start: 2024-04-05 | End: 2024-04-15

## 2024-04-05 RX ORDER — GABAPENTIN 300 MG/1
300 CAPSULE ORAL 2 TIMES DAILY
Qty: 180 CAPSULE | Refills: 1 | Status: SHIPPED | OUTPATIENT
Start: 2024-04-05 | End: 2024-10-02

## 2024-04-05 RX ORDER — TIRZEPATIDE 10 MG/.5ML
10 INJECTION, SOLUTION SUBCUTANEOUS WEEKLY
Qty: 6 ML | Refills: 0 | Status: SHIPPED | OUTPATIENT
Start: 2024-04-05

## 2024-04-05 RX ORDER — MIRTAZAPINE 15 MG/1
15-45 TABLET, FILM COATED ORAL NIGHTLY
Qty: 30 TABLET | Refills: 0 | Status: SHIPPED | OUTPATIENT
Start: 2024-04-05

## 2024-04-05 SDOH — ECONOMIC STABILITY: FOOD INSECURITY: WITHIN THE PAST 12 MONTHS, THE FOOD YOU BOUGHT JUST DIDN'T LAST AND YOU DIDN'T HAVE MONEY TO GET MORE.: NEVER TRUE

## 2024-04-05 SDOH — ECONOMIC STABILITY: FOOD INSECURITY: WITHIN THE PAST 12 MONTHS, YOU WORRIED THAT YOUR FOOD WOULD RUN OUT BEFORE YOU GOT MONEY TO BUY MORE.: NEVER TRUE

## 2024-04-05 SDOH — ECONOMIC STABILITY: INCOME INSECURITY: HOW HARD IS IT FOR YOU TO PAY FOR THE VERY BASICS LIKE FOOD, HOUSING, MEDICAL CARE, AND HEATING?: NOT HARD AT ALL

## 2024-04-05 NOTE — TELEPHONE ENCOUNTER
From: Jeovany Anthony  To: Alcides Villegas  Sent: 4/5/2024 5:20 PM EDT  Subject: Ear flush    It got so late that I think we forgot about them checking my right ear and possibly flushing it out. Is that something I can just make and nurse's appointment for?

## 2024-04-09 ENCOUNTER — TELEPHONE (OUTPATIENT)
Dept: ORTHOPEDIC SURGERY | Age: 59
End: 2024-04-09

## 2024-04-09 ASSESSMENT — ENCOUNTER SYMPTOMS
VOMITING: 0
SINUS PRESSURE: 1
ABDOMINAL PAIN: 0
EYE PAIN: 0
WHEEZING: 0
SHORTNESS OF BREATH: 0
DIARRHEA: 0
EYE DISCHARGE: 0
ABDOMINAL DISTENTION: 0
SINUS PAIN: 1
NAUSEA: 0
COUGH: 0
SORE THROAT: 0
EYE REDNESS: 0

## 2024-04-09 NOTE — TELEPHONE ENCOUNTER
General Question     Subject: ORTHOVISC INJ  Patient and /or Facility Request: Jeovany Anthony \"Lyle\"   Contact Number: [phone]     PT'S WIFE CALLING STATING THAT THEIR INSURANCE DID NOT APPROVED THE PT'S ORTHOVISC INJECTIONS. WIFE WANTS TO KNOW IF THEY CAN RECEIVE A DISCOUNT OR THERE ARE CHEAPER INJECTIONS THE PATIENT CAN GET    PLEASE CALL THE PT BACK AT THE ABOVE NUMBER

## 2024-04-10 DIAGNOSIS — E78.00 HYPERCHOLESTEREMIA: ICD-10-CM

## 2024-04-10 RX ORDER — ROSUVASTATIN CALCIUM 20 MG/1
20 TABLET, COATED ORAL DAILY
Qty: 30 TABLET | Refills: 5 | Status: SHIPPED | OUTPATIENT
Start: 2024-04-10

## 2024-04-18 ENCOUNTER — HOSPITAL ENCOUNTER (OUTPATIENT)
Dept: ULTRASOUND IMAGING | Age: 59
Discharge: HOME | End: 2024-04-18
Payer: COMMERCIAL

## 2024-04-18 DIAGNOSIS — R10.9 RIGHT LATERAL ABDOMINAL PAIN: ICD-10-CM

## 2024-04-18 PROCEDURE — 76705 ECHO EXAM OF ABDOMEN: CPT

## 2024-04-29 DIAGNOSIS — M35.3 PMR (POLYMYALGIA RHEUMATICA) (HCC): ICD-10-CM

## 2024-04-30 RX ORDER — CELECOXIB 200 MG/1
200 CAPSULE ORAL 2 TIMES DAILY
Qty: 180 CAPSULE | Refills: 1 | Status: SHIPPED | OUTPATIENT
Start: 2024-04-30

## 2024-05-07 DIAGNOSIS — F51.04 PSYCHOPHYSIOLOGIC INSOMNIA: ICD-10-CM

## 2024-05-07 RX ORDER — MIRTAZAPINE 15 MG/1
TABLET, FILM COATED ORAL
Qty: 30 TABLET | Refills: 3 | Status: SHIPPED | OUTPATIENT
Start: 2024-05-07

## 2024-05-27 ENCOUNTER — PATIENT MESSAGE (OUTPATIENT)
Dept: FAMILY MEDICINE CLINIC | Age: 59
End: 2024-05-27

## 2024-05-27 DIAGNOSIS — E66.01 SEVERE OBESITY (BMI 35.0-39.9) WITH COMORBIDITY (HCC): ICD-10-CM

## 2024-05-27 DIAGNOSIS — R11.0 NAUSEA: ICD-10-CM

## 2024-05-28 RX ORDER — ONDANSETRON 8 MG/1
8 TABLET, ORALLY DISINTEGRATING ORAL EVERY 8 HOURS PRN
Qty: 20 TABLET | Refills: 2 | Status: SHIPPED | OUTPATIENT
Start: 2024-05-28

## 2024-05-28 RX ORDER — TIRZEPATIDE 10 MG/.5ML
10 INJECTION, SOLUTION SUBCUTANEOUS WEEKLY
Qty: 6 ML | Refills: 2 | Status: SHIPPED | OUTPATIENT
Start: 2024-05-28 | End: 2024-05-29

## 2024-05-28 NOTE — TELEPHONE ENCOUNTER
From: Jeovany Anthony  To: Alcides Villegas  Sent: 5/27/2024 12:20 PM EDT  Subject: Refills    I am currently at zero refills for ondansetron wegovy. If possible could you send in a prescription for each , please and thank you.

## 2024-05-29 RX ORDER — SEMAGLUTIDE 2.4 MG/.75ML
INJECTION, SOLUTION SUBCUTANEOUS
Qty: 9 ML | Refills: 1 | OUTPATIENT
Start: 2024-05-29

## 2024-05-29 RX ORDER — SEMAGLUTIDE 2.4 MG/.75ML
2.4 INJECTION, SOLUTION SUBCUTANEOUS
Qty: 9 ML | Refills: 1 | Status: SHIPPED | OUTPATIENT
Start: 2024-05-29

## 2024-06-06 ENCOUNTER — PATIENT MESSAGE (OUTPATIENT)
Dept: FAMILY MEDICINE CLINIC | Age: 59
End: 2024-06-06

## 2024-06-06 DIAGNOSIS — M06.9 RHEUMATOID ARTHRITIS INVOLVING MULTIPLE SITES, UNSPECIFIED WHETHER RHEUMATOID FACTOR PRESENT (HCC): ICD-10-CM

## 2024-06-06 DIAGNOSIS — M35.3 PMR (POLYMYALGIA RHEUMATICA) (HCC): Primary | ICD-10-CM

## 2024-06-06 NOTE — TELEPHONE ENCOUNTER
From: Jeovany Anthony  To: Alcides Villegas  Sent: 6/6/2024 10:37 AM EDT  Subject: Referral     Are you able to send a referral for me to this doctor, please.   I would like to switch from my current rheumatologist at .

## 2024-06-08 ENCOUNTER — PATIENT MESSAGE (OUTPATIENT)
Dept: FAMILY MEDICINE CLINIC | Age: 59
End: 2024-06-08

## 2024-06-08 DIAGNOSIS — M35.3 PMR (POLYMYALGIA RHEUMATICA) (HCC): Primary | ICD-10-CM

## 2024-06-08 DIAGNOSIS — M06.9 RHEUMATOID ARTHRITIS INVOLVING MULTIPLE SITES, UNSPECIFIED WHETHER RHEUMATOID FACTOR PRESENT (HCC): ICD-10-CM

## 2024-06-10 NOTE — TELEPHONE ENCOUNTER
From: Jeovany Anthony  To: Alcides Villegas  Sent: 6/8/2024 7:59 PM EDT  Subject: Referral    Ilan,   Can you send a referral to Win Simpson at [facility] please. The fax # is [phone]. Thank you

## 2024-06-17 RX ORDER — OLMESARTAN MEDOXOMIL 40 MG/1
40 TABLET ORAL DAILY
Qty: 90 TABLET | Refills: 0 | Status: SHIPPED | OUTPATIENT
Start: 2024-06-17 | End: 2024-09-15

## 2024-06-18 DIAGNOSIS — I10 ESSENTIAL HYPERTENSION: ICD-10-CM

## 2024-06-18 NOTE — Clinical Note
Chief Complaint   Patient presents with    Fall     \"Have you been to the ER, urgent care clinic since your last visit?  Hospitalized since your last visit?\"    NO    “Have you seen or consulted any other health care providers outside of [facility] since your last visit?”    NO            Click Here for Release of Records Request     Successful completion of bilateral temporal artery biopsy today. Pathology pending.  Thanks [employer] and testing done and/or upcoming labs/test were reviewed and discussed   Reviewed and discussed daily activity, exercise and diet      Return in about 6 weeks (around 7/30/2024) for follow up for routine visit or before if needed, follow up if symptoms persist.     Cherise Arreguin, RODRIGO - NP      The patient (or guardian, if applicable) and other individuals in attendance with the patient were advised that Artificial Intelligence will be utilized during this visit to record and process the conversation to generate a clinical note. The patient (or guardian, if applicable) and other individuals in attendance at the appointment consented to the use of AI, including the recording.

## 2024-06-19 ENCOUNTER — OFFICE VISIT (OUTPATIENT)
Dept: FAMILY MEDICINE CLINIC | Age: 59
End: 2024-06-19

## 2024-06-19 VITALS — HEIGHT: 73 IN | BODY MASS INDEX: 36.84 KG/M2 | WEIGHT: 278 LBS

## 2024-06-19 DIAGNOSIS — H61.23 BILATERAL IMPACTED CERUMEN: Primary | ICD-10-CM

## 2024-06-19 DIAGNOSIS — H69.93 EUSTACHIAN TUBE DYSFUNCTION, BILATERAL: ICD-10-CM

## 2024-06-19 PROCEDURE — 99999 PR OFFICE/OUTPT VISIT,PROCEDURE ONLY: CPT | Performed by: NURSE PRACTITIONER

## 2024-06-19 RX ORDER — NIFEDIPINE 60 MG/1
60 TABLET, EXTENDED RELEASE ORAL DAILY
Qty: 90 TABLET | Refills: 1 | Status: SHIPPED | OUTPATIENT
Start: 2024-06-19

## 2024-06-19 RX ORDER — IPRATROPIUM BROMIDE 42 UG/1
2 SPRAY, METERED NASAL 4 TIMES DAILY
Qty: 15 ML | Refills: 3 | Status: SHIPPED | OUTPATIENT
Start: 2024-06-19

## 2024-06-19 NOTE — PROGRESS NOTES
Patient came in for ear flush.  Switched to labs only appointment given that he had no other concerns.  Did visualize canals after irrigation given persistent right ear discomfort.  Mild bleeding following irrigation.  Did have signs of eustachian tube dysfunction.  Has been taking Afrin.  Will send ipratropium.  Quickly educated on medication use as well as side effects.

## 2024-06-19 NOTE — PATIENT INSTRUCTIONS
You may receive a survey regarding the care you received during your visit.  Your input is valuable to us.  We encourage you to complete and return your survey.  We hope you will choose us in the future for your healthcare needs. GENERAL OFFICE POLICIES      Telephone Calls: Messages will be answered within 1-2 business days, unless the provider is out of the office.  If it is urgent a covering provider will answer. (this does not include Medication refills).    MyChart:  We recommend all patients sign up for [employer].  Through this portal you can see your lab results, request refills, schedule appointments, pay your bill and send messages to the office.   [employer] messages will be answered within 1-2 business days unless the provider is out of the office.  For urgent matters, please call the office.  Appointments:  All appointments must be scheduled.  We ask all patients to schedule their next follow up appointment before they leave the office to make sure you will be able to be seen before you run out of medications.  24 hours notice is required to cancel or reschedule an appointment to avoid being marked as a no show.  You may be dismissed from the practice after 3 no shows.    LATE for Appointment: If you are 15 or more minutes late for your appointment, you may be asked to reschedule.  MA/LAB APPTS: Must be scheduled, cannot accept walk in lab visits.  We only draw labs for patients established in our office.  We only do injections for medications ordered by our office.  Acute Sick Visits:  Nothing other than acute complaint will be addressed at this visit.  TRADITIONAL MEDICARE  DOES NOT COVER PHYSICALS  MEDICARE WELLNESS VISITS: These are NOT physicals but the free annual visit offered by Medicare to discuss wellness issues. Medication refills, checkups, etc. will not be addressed during this visit.  Medication Refills: Refills are handled electronically so please contact your pharmacy for medication refills

## 2024-06-24 ENCOUNTER — PATIENT MESSAGE (OUTPATIENT)
Dept: FAMILY MEDICINE CLINIC | Age: 59
End: 2024-06-24

## 2024-06-24 DIAGNOSIS — M06.9 RHEUMATOID ARTHRITIS INVOLVING MULTIPLE SITES, UNSPECIFIED WHETHER RHEUMATOID FACTOR PRESENT (HCC): Primary | ICD-10-CM

## 2024-06-30 ENCOUNTER — PATIENT MESSAGE (OUTPATIENT)
Dept: FAMILY MEDICINE CLINIC | Age: 59
End: 2024-06-30

## 2024-06-30 DIAGNOSIS — E66.01 SEVERE OBESITY (BMI 35.0-39.9) WITH COMORBIDITY (HCC): Primary | ICD-10-CM

## 2024-07-01 RX ORDER — TIRZEPATIDE 10 MG/.5ML
10 INJECTION, SOLUTION SUBCUTANEOUS WEEKLY
Qty: 6 ML | Refills: 0 | Status: SHIPPED | OUTPATIENT
Start: 2024-07-01

## 2024-07-01 NOTE — TELEPHONE ENCOUNTER
From: Jeovany Anthony  To: Alcides Villegas  Sent: 6/30/2024 8:15 PM EDT  Subject: Zepbound    The initial glitch between cecilia and Charly has been resolved. After two (2) injections im down almost 4 pounds. I believe it is 7.5 mg. I wanted to know if you could do a 90 day prescription for the 10 mg before my PA runs out in July please   Thanks.

## 2024-07-17 ENCOUNTER — PATIENT MESSAGE (OUTPATIENT)
Dept: FAMILY MEDICINE CLINIC | Age: 59
End: 2024-07-17

## 2024-07-17 NOTE — TELEPHONE ENCOUNTER
From: Jeovany Anthony  To: Alcides Villegas  Sent: 7/17/2024 11:56 AM EDT  Subject: APPOINTMENT     Is it possible to make a lab or nurse's appointment for a blood pressure check. It has been running high and I did speak with the on-call physician this past weekend and amended my blood pressure medications. I feel like I still need to make an appointment even though I've made those changes and I am hopeful that my results start to trend downward. I have attached a list of blood pressure results and dates

## 2024-07-22 ENCOUNTER — NURSE ONLY (OUTPATIENT)
Dept: FAMILY MEDICINE CLINIC | Age: 59
End: 2024-07-22

## 2024-07-22 ENCOUNTER — TELEPHONE (OUTPATIENT)
Dept: FAMILY MEDICINE CLINIC | Age: 59
End: 2024-07-22

## 2024-07-22 VITALS — DIASTOLIC BLOOD PRESSURE: 82 MMHG | SYSTOLIC BLOOD PRESSURE: 140 MMHG

## 2024-07-22 NOTE — TELEPHONE ENCOUNTER
PT CAME IN OFFICE FOR BLOOD PRESSURE CHECK HE HAS BEEN TAKING 80 MG OF LOSARTAN FOR THE PAST 2 DAYS BLOOD PRESSURE SEEMS TO BE COMING DOWN. SHOULD HE STAY ON THAT DOSE OR WHAT DO YOU RECOMMEND?

## 2024-09-05 DIAGNOSIS — F51.04 PSYCHOPHYSIOLOGIC INSOMNIA: ICD-10-CM

## 2024-09-05 DIAGNOSIS — I10 ESSENTIAL HYPERTENSION: ICD-10-CM

## 2024-09-05 RX ORDER — MIRTAZAPINE 15 MG/1
TABLET, FILM COATED ORAL
Qty: 30 TABLET | Refills: 0 | Status: SHIPPED | OUTPATIENT
Start: 2024-09-05

## 2024-09-06 RX ORDER — OLMESARTAN MEDOXOMIL 40 MG/1
40 TABLET ORAL DAILY
Qty: 90 TABLET | Refills: 0 | OUTPATIENT
Start: 2024-09-06 | End: 2024-12-05

## 2024-09-06 RX ORDER — NIFEDIPINE 60 MG/1
60 TABLET, EXTENDED RELEASE ORAL DAILY
Qty: 90 TABLET | Refills: 1 | OUTPATIENT
Start: 2024-09-06

## 2024-09-20 ENCOUNTER — OFFICE VISIT (OUTPATIENT)
Dept: FAMILY MEDICINE CLINIC | Age: 59
End: 2024-09-20
Payer: COMMERCIAL

## 2024-09-20 VITALS
DIASTOLIC BLOOD PRESSURE: 84 MMHG | SYSTOLIC BLOOD PRESSURE: 160 MMHG | HEART RATE: 79 BPM | HEIGHT: 73 IN | BODY MASS INDEX: 34.59 KG/M2 | OXYGEN SATURATION: 98 % | WEIGHT: 261 LBS

## 2024-09-20 DIAGNOSIS — F51.04 PSYCHOPHYSIOLOGIC INSOMNIA: ICD-10-CM

## 2024-09-20 DIAGNOSIS — F41.9 ANXIETY: Primary | ICD-10-CM

## 2024-09-20 DIAGNOSIS — R97.20 ELEVATED PSA: ICD-10-CM

## 2024-09-20 DIAGNOSIS — I10 ESSENTIAL HYPERTENSION: ICD-10-CM

## 2024-09-20 DIAGNOSIS — R53.82 CHRONIC FATIGUE: ICD-10-CM

## 2024-09-20 DIAGNOSIS — M79.605 PAIN IN LEFT LEG: ICD-10-CM

## 2024-09-20 DIAGNOSIS — E66.09 CLASS 1 OBESITY DUE TO EXCESS CALORIES WITH SERIOUS COMORBIDITY AND BODY MASS INDEX (BMI) OF 34.0 TO 34.9 IN ADULT: ICD-10-CM

## 2024-09-20 DIAGNOSIS — E78.00 HYPERCHOLESTEREMIA: ICD-10-CM

## 2024-09-20 DIAGNOSIS — E55.9 VITAMIN D DEFICIENCY: ICD-10-CM

## 2024-09-20 PROCEDURE — 3077F SYST BP >= 140 MM HG: CPT | Performed by: NURSE PRACTITIONER

## 2024-09-20 PROCEDURE — 3079F DIAST BP 80-89 MM HG: CPT | Performed by: NURSE PRACTITIONER

## 2024-09-20 PROCEDURE — 99215 OFFICE O/P EST HI 40 MIN: CPT | Performed by: NURSE PRACTITIONER

## 2024-09-20 PROCEDURE — G2211 COMPLEX E/M VISIT ADD ON: HCPCS | Performed by: NURSE PRACTITIONER

## 2024-09-20 RX ORDER — ADALIMUMAB-BWWD 40 MG/.4ML
SOLUTION SUBCUTANEOUS
COMMUNITY
Start: 2024-08-27

## 2024-09-20 RX ORDER — MIRTAZAPINE 15 MG/1
15-45 TABLET, FILM COATED ORAL NIGHTLY
Qty: 90 TABLET | Refills: 2 | Status: SHIPPED | OUTPATIENT
Start: 2024-09-20

## 2024-09-20 RX ORDER — ROSUVASTATIN CALCIUM 20 MG/1
20 TABLET, COATED ORAL DAILY
Qty: 90 TABLET | Refills: 2 | Status: SHIPPED | OUTPATIENT
Start: 2024-09-20

## 2024-09-20 RX ORDER — ESCITALOPRAM OXALATE 10 MG/1
10 TABLET ORAL DAILY
Qty: 30 TABLET | Refills: 0 | Status: SHIPPED | OUTPATIENT
Start: 2024-09-20

## 2024-09-20 RX ORDER — TIRZEPATIDE 10 MG/.5ML
10 INJECTION, SOLUTION SUBCUTANEOUS WEEKLY
Qty: 6 ML | Refills: 0 | Status: SHIPPED | OUTPATIENT
Start: 2024-09-20

## 2024-09-21 LAB
25(OH)D3 SERPL-MCNC: 28 NG/ML
ALBUMIN SERPL-MCNC: 4.3 G/DL (ref 3.4–5)
ALBUMIN/GLOB SERPL: 2.2 {RATIO} (ref 1.1–2.2)
ALP SERPL-CCNC: 73 U/L (ref 40–129)
ALT SERPL-CCNC: 79 U/L (ref 10–40)
ANION GAP SERPL CALCULATED.3IONS-SCNC: 10 MMOL/L (ref 3–16)
AST SERPL-CCNC: 31 U/L (ref 15–37)
BILIRUB SERPL-MCNC: 1 MG/DL (ref 0–1)
BUN SERPL-MCNC: 17 MG/DL (ref 7–20)
CALCIUM SERPL-MCNC: 10.1 MG/DL (ref 8.3–10.6)
CHLORIDE SERPL-SCNC: 104 MMOL/L (ref 99–110)
CO2 SERPL-SCNC: 25 MMOL/L (ref 21–32)
CREAT SERPL-MCNC: 1.2 MG/DL (ref 0.9–1.3)
GFR SERPLBLD CREATININE-BSD FMLA CKD-EPI: 71 ML/MIN/{1.73_M2}
GLUCOSE SERPL-MCNC: 109 MG/DL (ref 70–99)
POTASSIUM SERPL-SCNC: 4.5 MMOL/L (ref 3.5–5.1)
PROT SERPL-MCNC: 6.3 G/DL (ref 6.4–8.2)
SODIUM SERPL-SCNC: 139 MMOL/L (ref 136–145)

## 2024-09-23 ASSESSMENT — ENCOUNTER SYMPTOMS
WHEEZING: 0
EYE PAIN: 0
COUGH: 0
DIARRHEA: 0
SINUS PAIN: 0
NAUSEA: 0
EYE REDNESS: 0
SHORTNESS OF BREATH: 0
SINUS PRESSURE: 0
ABDOMINAL PAIN: 0
SORE THROAT: 0
EYE DISCHARGE: 0
BACK PAIN: 1
ABDOMINAL DISTENTION: 0
VOMITING: 0

## 2024-09-24 LAB
PROSTATE SPECIFIC ANTIGEN: 0.3 NG/ML (ref 0–4)
PSA FREE MFR SERPL: 33.3 %
PSA FREE SERPL-MCNC: 0.1 UG/L
SHBG SERPL-SCNC: 28 NMOL/L (ref 19–76)
TESTOST FREE SERPL-MCNC: 34.9 PG/ML (ref 47–244)
TESTOST SERPL-MCNC: 169 NG/DL (ref 193–740)

## 2024-10-02 ENCOUNTER — PATIENT MESSAGE (OUTPATIENT)
Dept: FAMILY MEDICINE CLINIC | Age: 59
End: 2024-10-02

## 2024-10-02 DIAGNOSIS — R22.0 SWELLING OF LEFT SIDE OF FACE: Primary | ICD-10-CM

## 2024-10-03 ENCOUNTER — OFFICE VISIT (OUTPATIENT)
Dept: SLEEP MEDICINE | Age: 59
End: 2024-10-03
Payer: COMMERCIAL

## 2024-10-03 ENCOUNTER — PATIENT MESSAGE (OUTPATIENT)
Dept: FAMILY MEDICINE CLINIC | Age: 59
End: 2024-10-03

## 2024-10-03 VITALS
SYSTOLIC BLOOD PRESSURE: 130 MMHG | OXYGEN SATURATION: 98 % | DIASTOLIC BLOOD PRESSURE: 80 MMHG | TEMPERATURE: 98.2 F | RESPIRATION RATE: 18 BRPM | WEIGHT: 264 LBS | HEART RATE: 80 BPM | HEIGHT: 73 IN | BODY MASS INDEX: 34.99 KG/M2

## 2024-10-03 DIAGNOSIS — G47.33 OSA (OBSTRUCTIVE SLEEP APNEA): Primary | ICD-10-CM

## 2024-10-03 DIAGNOSIS — R53.83 FATIGUE, UNSPECIFIED TYPE: ICD-10-CM

## 2024-10-03 DIAGNOSIS — R79.89 LOW TESTOSTERONE: Primary | ICD-10-CM

## 2024-10-03 DIAGNOSIS — R06.89 GASPING FOR BREATH: ICD-10-CM

## 2024-10-03 DIAGNOSIS — I10 PRIMARY HYPERTENSION: ICD-10-CM

## 2024-10-03 DIAGNOSIS — R06.83 SNORING: ICD-10-CM

## 2024-10-03 DIAGNOSIS — E66.811 CLASS 1 OBESITY DUE TO EXCESS CALORIES WITH SERIOUS COMORBIDITY AND BODY MASS INDEX (BMI) OF 34.0 TO 34.9 IN ADULT: ICD-10-CM

## 2024-10-03 DIAGNOSIS — E66.09 CLASS 1 OBESITY DUE TO EXCESS CALORIES WITH SERIOUS COMORBIDITY AND BODY MASS INDEX (BMI) OF 34.0 TO 34.9 IN ADULT: ICD-10-CM

## 2024-10-03 PROCEDURE — 99204 OFFICE O/P NEW MOD 45 MIN: CPT | Performed by: PSYCHIATRY & NEUROLOGY

## 2024-10-03 PROCEDURE — 3075F SYST BP GE 130 - 139MM HG: CPT | Performed by: PSYCHIATRY & NEUROLOGY

## 2024-10-03 PROCEDURE — 3079F DIAST BP 80-89 MM HG: CPT | Performed by: PSYCHIATRY & NEUROLOGY

## 2024-10-03 ASSESSMENT — SLEEP AND FATIGUE QUESTIONNAIRES
HOW LIKELY ARE YOU TO NOD OFF OR FALL ASLEEP WHILE SITTING QUIETLY AFTER LUNCH WITHOUT ALCOHOL: SLIGHT CHANCE OF DOZING
HOW LIKELY ARE YOU TO NOD OFF OR FALL ASLEEP WHILE SITTING AND TALKING TO SOMEONE: WOULD NEVER DOZE
HOW LIKELY ARE YOU TO NOD OFF OR FALL ASLEEP WHILE SITTING AND READING: MODERATE CHANCE OF DOZING
HOW LIKELY ARE YOU TO NOD OFF OR FALL ASLEEP WHILE WATCHING TV: MODERATE CHANCE OF DOZING
HOW LIKELY ARE YOU TO NOD OFF OR FALL ASLEEP IN A CAR, WHILE STOPPED FOR A FEW MINUTES IN TRAFFIC: WOULD NEVER DOZE
HOW LIKELY ARE YOU TO NOD OFF OR FALL ASLEEP WHILE LYING DOWN TO REST IN THE AFTERNOON WHEN CIRCUMSTANCES PERMIT: HIGH CHANCE OF DOZING
ESS TOTAL SCORE: 8
HOW LIKELY ARE YOU TO NOD OFF OR FALL ASLEEP WHEN YOU ARE A PASSENGER IN A CAR FOR AN HOUR WITHOUT A BREAK: WOULD NEVER DOZE

## 2024-10-03 ASSESSMENT — ENCOUNTER SYMPTOMS
GASTROINTESTINAL NEGATIVE: 1
EYES NEGATIVE: 1
CHOKING: 1

## 2024-10-03 NOTE — PROGRESS NOTES
MD LISA Ontiveros Board Certified in Sleep Medicine  [facility]  Board Certified in Neurology [facility]  [address]   [address]  [place]  [address]  P- [phone]   [facility]   [address]  [address]   [place], [place] [address]           [facility]  [address] [id number] [phone]    Subjective:     Patient ID: Jeovany Anthony is a 58 y.o. male.    Chief Complaint   Patient presents with    [facility]    Sleep Apnea       HPI:        Jeovany Anthony is a 58 y.o. male referred by Dr. Fuchs for a sleep evaluation. He complains of snoring, snorting, choking, tossing and turning, feels sleepy during the day, fatigue but he denies periods of not breathing, knees buckling with laughing, completely or partially paralyzed while falling asleep or waking up.  Symptoms began several years ago, gradually worsening since that time.   The patient's bed-partner confirmed the snoring without stopped breathing at night.  Started Remeron 15 mg and helped him to stay in sleep.    SLEEP SCHEDULE: Goes to bed around 11 PM in the weekdays and 11 PM in the weekends. It usually takes the patient 10-15 minutes to fall asleep. The patient gets up 1 per night to go to the bathroom. The Patient finally gets up at 7 AM during the weekdays and 7 AM in the weekends. patient wakes up with dry mouth..   The patient has restless sleep with frequent arousals in addition to the Patient has significant daytime sleepiness. The Patient scored [place] Sleepiness Score: 8 on [place] Sleepiness Scale ( more than 10 is indicative of daytime sleepiness)and 41 in fatigue scale ( more than 36 is indicative of daytime fatigue). The patient takes one nap/week for 60 minutes and usually is  refreshing nap.     Previous evaluation and treatment has included-

## 2024-10-04 RX ORDER — TESTOSTERONE CYPIONATE 200 MG/ML
200 INJECTION, SOLUTION INTRAMUSCULAR
Qty: 2 ML | Refills: 2 | Status: SHIPPED | OUTPATIENT
Start: 2024-10-04 | End: 2025-01-02

## 2024-10-04 RX ORDER — SYRINGE W-NEEDLE,DISPOSAB,3 ML 23GX1"
1 SYRINGE, EMPTY DISPOSABLE MISCELLANEOUS
Qty: 2 EACH | Refills: 2 | Status: SHIPPED | OUTPATIENT
Start: 2024-10-04

## 2024-10-07 ENCOUNTER — TELEPHONE (OUTPATIENT)
Dept: ADMINISTRATIVE | Age: 59
End: 2024-10-07

## 2024-10-07 NOTE — TELEPHONE ENCOUNTER
Submitted PA for Testosterone Cypionate 200MG/ML intramuscular solution   Via CMM  (Key: [id number])  STATUS: NOT SENT    Is the patient's testicular hypogonadism primary or secondary hypogonadism? NOTE: Primary hypogonadism (congenital or acquired): for example bilateral torsion, cryptorchidism, chemotherapy, Klinefelter Syndrome, orchitis, orchiectomy, toxic damage from alcohol or heavy metals, Vanishing Testis Syndrome, idiopathic primary hypogonadism (cause is unknown), or age-related hypogonadism (also referred to as late-onset hypogonadism). Secondary hypogonadism or hypogonadotropic hypogonadism (congenital or acquired): for example, idiopathic gonadotropic or luteinizing hormone-releasing hormone (LHRH) deficiency (cause is unknown), or pituitary-hypothalamic injury.*     Please submit back to PA pool to complete PA.     Thank you.

## 2024-10-08 NOTE — TELEPHONE ENCOUNTER
Submitted PA for Testosterone Cypionate 200MG/ML intramuscular solution   Via CMM Key: [id number]  STATUS: PA Case: [id number], Status: Approved, Coverage Starts on: 10/8/2024 12:00:00 AM, Coverage Ends on: 10/8/2025 12:00:00 AM.  Authorization Expiration Date: 10/7/2025    Please notify patient. Thank you.

## 2024-10-15 ENCOUNTER — TELEPHONE (OUTPATIENT)
Dept: FAMILY MEDICINE CLINIC | Age: 59
End: 2024-10-15

## 2024-10-15 NOTE — TELEPHONE ENCOUNTER
Submitted PA for Zepbound 10MG/0.5ML pen-injectors   Via CM Key: [id number]  STATUS: PENDING.    Follow up done daily; if no decision with in three days we will refax.  If another three days goes by with no decision will call the insurance for status.

## 2024-10-16 ENCOUNTER — HOSPITAL ENCOUNTER (OUTPATIENT)
Dept: SLEEP CENTER | Age: 59
Discharge: HOME | End: 2024-10-16
Attending: PSYCHIATRY & NEUROLOGY
Payer: COMMERCIAL

## 2024-10-16 DIAGNOSIS — G47.33 OSA (OBSTRUCTIVE SLEEP APNEA): ICD-10-CM

## 2024-10-16 PROCEDURE — 95806 SLEEP STUDY UNATT&RESP EFFT: CPT

## 2024-10-17 ENCOUNTER — PATIENT MESSAGE (OUTPATIENT)
Dept: FAMILY MEDICINE CLINIC | Age: 59
End: 2024-10-17

## 2024-10-17 DIAGNOSIS — H61.892 SWELLING OF LEFT EXTERNAL EAR: Primary | ICD-10-CM

## 2024-10-19 ENCOUNTER — HOSPITAL ENCOUNTER (OUTPATIENT)
Dept: ULTRASOUND IMAGING | Age: 59
Discharge: HOME | End: 2024-10-19
Payer: COMMERCIAL

## 2024-10-19 DIAGNOSIS — H61.892 SWELLING OF LEFT EXTERNAL EAR: ICD-10-CM

## 2024-10-19 PROCEDURE — 76536 US EXAM OF HEAD AND NECK: CPT

## 2024-10-19 PROCEDURE — 76999 ECHO EXAMINATION PROCEDURE: CPT

## 2024-10-20 DIAGNOSIS — M06.9 RHEUMATOID ARTHRITIS INVOLVING MULTIPLE SITES, UNSPECIFIED WHETHER RHEUMATOID FACTOR PRESENT (HCC): ICD-10-CM

## 2024-10-21 ENCOUNTER — OFFICE VISIT (OUTPATIENT)
Dept: FAMILY MEDICINE CLINIC | Age: 59
End: 2024-10-21
Payer: COMMERCIAL

## 2024-10-21 VITALS
BODY MASS INDEX: 34.59 KG/M2 | HEIGHT: 73 IN | DIASTOLIC BLOOD PRESSURE: 84 MMHG | SYSTOLIC BLOOD PRESSURE: 126 MMHG | OXYGEN SATURATION: 97 % | HEART RATE: 77 BPM | WEIGHT: 261 LBS

## 2024-10-21 DIAGNOSIS — E66.09 CLASS 1 OBESITY DUE TO EXCESS CALORIES WITH SERIOUS COMORBIDITY AND BODY MASS INDEX (BMI) OF 34.0 TO 34.9 IN ADULT: ICD-10-CM

## 2024-10-21 DIAGNOSIS — E66.811 CLASS 1 OBESITY DUE TO EXCESS CALORIES WITH SERIOUS COMORBIDITY AND BODY MASS INDEX (BMI) OF 34.0 TO 34.9 IN ADULT: ICD-10-CM

## 2024-10-21 DIAGNOSIS — R22.0 SWELLING OF LEFT SIDE OF FACE: Primary | ICD-10-CM

## 2024-10-21 PROCEDURE — 3079F DIAST BP 80-89 MM HG: CPT | Performed by: NURSE PRACTITIONER

## 2024-10-21 PROCEDURE — 99213 OFFICE O/P EST LOW 20 MIN: CPT | Performed by: NURSE PRACTITIONER

## 2024-10-21 PROCEDURE — 3074F SYST BP LT 130 MM HG: CPT | Performed by: NURSE PRACTITIONER

## 2024-10-21 RX ORDER — SEMAGLUTIDE 2.4 MG/.75ML
2.4 INJECTION, SOLUTION SUBCUTANEOUS
Qty: 9 ML | Refills: 1 | Status: SHIPPED | OUTPATIENT
Start: 2024-10-21

## 2024-10-21 RX ORDER — METHYLPREDNISOLONE 4 MG/1
TABLET ORAL
Qty: 1 KIT | Refills: 0 | Status: SHIPPED | OUTPATIENT
Start: 2024-10-21 | End: 2024-10-27

## 2024-10-21 SDOH — ECONOMIC STABILITY: INCOME INSECURITY: HOW HARD IS IT FOR YOU TO PAY FOR THE VERY BASICS LIKE FOOD, HOUSING, MEDICAL CARE, AND HEATING?: NOT HARD AT ALL

## 2024-10-21 SDOH — ECONOMIC STABILITY: INCOME INSECURITY: IN THE LAST 12 MONTHS, WAS THERE A TIME WHEN YOU WERE NOT ABLE TO PAY THE MORTGAGE OR RENT ON TIME?: NO

## 2024-10-21 SDOH — ECONOMIC STABILITY: FOOD INSECURITY: WITHIN THE PAST 12 MONTHS, YOU WORRIED THAT YOUR FOOD WOULD RUN OUT BEFORE YOU GOT MONEY TO BUY MORE.: NEVER TRUE

## 2024-10-21 SDOH — ECONOMIC STABILITY: FOOD INSECURITY: WITHIN THE PAST 12 MONTHS, THE FOOD YOU BOUGHT JUST DIDN'T LAST AND YOU DIDN'T HAVE MONEY TO GET MORE.: NEVER TRUE

## 2024-10-21 ASSESSMENT — ENCOUNTER SYMPTOMS
SINUS PAIN: 0
SINUS PRESSURE: 0
COUGH: 0
RHINORRHEA: 0
SHORTNESS OF BREATH: 0
SORE THROAT: 0
WHEEZING: 0
FACIAL SWELLING: 1

## 2024-10-21 NOTE — PROGRESS NOTES
sores.  Eyes:      General: No scleral icterus.        Right eye: No discharge.         Left eye: No discharge.      Extraocular Movements: Extraocular movements intact.      Conjunctiva/sclera: Conjunctivae normal.      Pupils: Pupils are equal, round, and reactive to light.   Neck:      Vascular: No carotid bruit.   Pulmonary:      Effort: Pulmonary effort is normal.   Musculoskeletal:         General: No swelling or tenderness. Normal range of motion.      Cervical back: Normal range of motion and neck supple. No rigidity or tenderness.   Lymphadenopathy:      Cervical: No cervical adenopathy.   Neurological:      Mental Status: He is alert and oriented to person, place, and time.   Psychiatric:         Mood and Affect: Mood normal.         Behavior: Behavior normal.         Thought Content: Thought content normal.         Judgment: Judgment normal.               This dictation was generated by voice recognition computer software.  Although all attempts are made to edit the dictation for accuracy, there may be errors in the transcription that are not intended.    An electronic signature was used to authenticate this note.    --JORGE ALBERTO Espinal - CNP

## 2024-10-21 NOTE — TELEPHONE ENCOUNTER
Please let the patient know that apparently the only comorbidities that get covered for Zepbound are cardiovascular.  He would need to have a history of MI, stroke, or peripheral vascular disease to be covered.  Temporal arteritis is not on their diagnoses that are covered.  We could try to do an ankle-brachial index to see if this is abnormal, but otherwise medication does not look like it will be covered.

## 2024-10-21 NOTE — PROGRESS NOTES
Jeovany Anthony         : 1965  [] [facility]     [] [facility]      [] [facility]     [][facility]    [] [facility]  [] [facility]  [] [facility]   [] [facility] [] Other:  Diagnosis: [x] MONISHA (G47.33) [] CSA (G47.31) [] Apnea (G47.30)   Length of Need: [] 12 Months [x] 99 Months [] Other:    Machine (LILI!):  [x] ResMed AirSense     Auto [] Other:     [x]  CPAP () [] Bilevel ()   Mode: [x] Auto [] Spontaneous    Mode: [] Auto [] Spontaneous           Between 5 and 15 cm                 Comfort Settings:     If the order for CPAP  - Ramp Pressure: 5 cmH2O                                        - Ramp time: 15 min                                     -  Flex/EPR - 3 full time       Humidifier: [x] Heated ()        [x] Water chamber replacement ()/ 1 per 6 months        Mask:  Please always start with the mask the patient used during the titraion   [x] Nasal () /1 per 3 months [x] Full Face () /1 per 3 months   [x] Patient choice -Size and fit mask [x] Patient Choice - Size and fit mask   [] Dispense:  [] Dispense:    [x] Headgear () / 1 per 6 months [x] Headgear () / 1 per 3 months   [x] Replacement Nasal Cushion ()/2 per month [x] Interface Replacement ()/1 per month   [x] Replacement Nasal Pillows ()/2 per month         Tubing: [x] Heated ()/1 per 3 months    [] Standard ()/1 per 3 months [] Other:           Filters: [x] Non-disposable ()/1 per 6 months     [x] Ultra-Fine, Disposable ()/2 per month        Miscellaneous: [x] Chin Strap ()/ 1 per 6 months [] O2 bleed-in:       LPM   [] Oximetry on CPAP/Bilevel []  Other:          Start Order Date: 10/21/24    MEDICAL JUSTIFICATION:  I, the undersigned, certify that the above prescribed supplies are medically necessary for this patient’s wellbeing.  In my opinion, the supplies are both reasonable and necessary in reference to accepted standards of medicalpractice

## 2024-10-21 NOTE — TELEPHONE ENCOUNTER
The medication was DENIED; DENIAL letter is uploaded to MEDIA.    General Denial Reasoning:    ___  Patient must try   medication before the insurance will cover this drug.  Unless there is a contraindication that you can provide.    ___  Drug is not covered for off label usage.  This drug is FDA approved for  .    ___  Drug is not covered by the plan ( Plan Exclusion)    ___  Other; please see Denial Letter.    DIABETIC MEDICATION DENIALS:    ___ Must have a A1C greater the 7.0.    ___ Hypoglycemic episodes or 3 or more injections of insulin daily for Continuous Monitors.    ___   Drug is not covered by the plan ( Plan Exclusion)    _X__  Other; please see Denial Letter.    WEIGHT LOSS MEDICATIONS DENIALS:    ___  Must have Tried and Failed Diet and Exercise.    ___  Insurance requesting Weight Loss Diary.    ___   Drug is not covered by the plan ( Plan Exclusion)    ___  Other; please see Denial Letter.    Note :        If you want an APPEAL; please note in this encounter what new information you would like to APPEAL with.  Once complete route back to PA POOL.    If this requires a response please respond to the pool ( P MHCX PSC MEDICATION PRE-AUTH).      Thank you please advise patient.

## 2024-10-22 ENCOUNTER — TELEPHONE (OUTPATIENT)
Dept: PULMONOLOGY | Age: 59
End: 2024-10-22

## 2024-10-22 NOTE — TELEPHONE ENCOUNTER
HOME Sleep study showed mild MONISHA (on a scale of mild, moderate and severe).  AHI was 12.7  per hr. (Average times per hr breathing was obstructed).  O2 Desaturations to 85% (lowest o2)   Dr Recommends:    Follow up with the patient's sleep physician to discuss results      Avoid sedatives, alcohol and caffeinated drinks at bedtime.    Recommend:  APAP 5/15cm     Avoid driving while sleepy.       NEED DME

## 2024-10-23 DIAGNOSIS — F51.04 PSYCHOPHYSIOLOGIC INSOMNIA: ICD-10-CM

## 2024-10-23 DIAGNOSIS — F41.9 ANXIETY: ICD-10-CM

## 2024-10-23 NOTE — TELEPHONE ENCOUNTER
The patient has been notified of this information and all questions answered.      DME list sent in chart

## 2024-10-24 RX ORDER — ESCITALOPRAM OXALATE 10 MG/1
10 TABLET ORAL DAILY
Qty: 30 TABLET | Refills: 0 | Status: SHIPPED | OUTPATIENT
Start: 2024-10-24

## 2024-10-28 ENCOUNTER — PATIENT MESSAGE (OUTPATIENT)
Dept: FAMILY MEDICINE CLINIC | Age: 59
End: 2024-10-28

## 2024-10-28 DIAGNOSIS — Z91.041 ALLERGY TO INTRAVENOUS CONTRAST: ICD-10-CM

## 2024-10-28 DIAGNOSIS — R22.0 SWELLING OF LEFT SIDE OF FACE: Primary | ICD-10-CM

## 2024-10-30 RX ORDER — DIPHENHYDRAMINE HCL 25 MG
50 CAPSULE ORAL ONCE
Qty: 2 CAPSULE | Refills: 0 | Status: SHIPPED | OUTPATIENT
Start: 2024-10-30 | End: 2024-10-30

## 2024-11-01 ENCOUNTER — PATIENT MESSAGE (OUTPATIENT)
Dept: FAMILY MEDICINE CLINIC | Age: 59
End: 2024-11-01

## 2024-11-01 DIAGNOSIS — M35.3 PMR (POLYMYALGIA RHEUMATICA) (HCC): Primary | ICD-10-CM

## 2024-11-04 RX ORDER — HYDROCODONE BITARTRATE AND ACETAMINOPHEN 5; 325 MG/1; MG/1
1 TABLET ORAL EVERY 6 HOURS PRN
Qty: 28 TABLET | Refills: 0 | Status: SHIPPED | OUTPATIENT
Start: 2024-11-04 | End: 2024-11-11

## 2024-11-05 ENCOUNTER — HOSPITAL ENCOUNTER (EMERGENCY)
Age: 59
Discharge: HOME | End: 2024-11-05
Attending: EMERGENCY MEDICINE
Payer: COMMERCIAL

## 2024-11-05 ENCOUNTER — APPOINTMENT (OUTPATIENT)
Dept: CT IMAGING | Age: 59
End: 2024-11-05
Payer: COMMERCIAL

## 2024-11-05 VITALS
DIASTOLIC BLOOD PRESSURE: 91 MMHG | BODY MASS INDEX: 33.53 KG/M2 | WEIGHT: 253 LBS | OXYGEN SATURATION: 99 % | SYSTOLIC BLOOD PRESSURE: 139 MMHG | RESPIRATION RATE: 16 BRPM | HEART RATE: 89 BPM | HEIGHT: 73 IN | TEMPERATURE: 98.4 F

## 2024-11-05 DIAGNOSIS — K62.5 RECTAL BLEEDING: Primary | ICD-10-CM

## 2024-11-05 DIAGNOSIS — R10.9 LEFT SIDED ABDOMINAL PAIN: ICD-10-CM

## 2024-11-05 DIAGNOSIS — K21.9 GASTROESOPHAGEAL REFLUX DISEASE, UNSPECIFIED WHETHER ESOPHAGITIS PRESENT: ICD-10-CM

## 2024-11-05 LAB
ABO + RH BLD: NORMAL
ALBUMIN SERPL-MCNC: 4.6 G/DL (ref 3.4–5)
ALBUMIN/GLOB SERPL: 1.9 {RATIO} (ref 1.1–2.2)
ALP SERPL-CCNC: 80 U/L (ref 40–129)
ALT SERPL-CCNC: 42 U/L (ref 10–40)
ANION GAP SERPL CALCULATED.3IONS-SCNC: 10 MMOL/L (ref 3–16)
AST SERPL-CCNC: 27 U/L (ref 15–37)
BASOPHILS # BLD: 0 K/UL (ref 0–0.2)
BASOPHILS NFR BLD: 0.7 %
BILIRUB DIRECT SERPL-MCNC: 0.4 MG/DL (ref 0–0.3)
BILIRUB INDIRECT SERPL-MCNC: 0.7 MG/DL (ref 0–1)
BILIRUB SERPL-MCNC: 1.1 MG/DL (ref 0–1)
BILIRUB UR QL STRIP.AUTO: NEGATIVE
BLD GP AB SCN SERPL QL: NORMAL
BUN SERPL-MCNC: 13 MG/DL (ref 7–20)
CALCIUM SERPL-MCNC: 9.9 MG/DL (ref 8.3–10.6)
CHLORIDE SERPL-SCNC: 107 MMOL/L (ref 99–110)
CLARITY UR: CLEAR
CO2 SERPL-SCNC: 25 MMOL/L (ref 21–32)
COLOR UR: YELLOW
CREAT SERPL-MCNC: 1.4 MG/DL (ref 0.9–1.3)
DEPRECATED RDW RBC AUTO: 15.3 % (ref 12.4–15.4)
EKG ATRIAL RATE: 82 BPM
EKG DIAGNOSIS: NORMAL
EKG P AXIS: 46 DEGREES
EKG P-R INTERVAL: 186 MS
EKG Q-T INTERVAL: 384 MS
EKG QRS DURATION: 78 MS
EKG QTC CALCULATION (BAZETT): 448 MS
EKG R AXIS: -26 DEGREES
EKG T AXIS: 31 DEGREES
EKG VENTRICULAR RATE: 82 BPM
EOSINOPHIL # BLD: 0 K/UL (ref 0–0.6)
EOSINOPHIL NFR BLD: 0.5 %
GFR SERPLBLD CREATININE-BSD FMLA CKD-EPI: 59 ML/MIN/{1.73_M2}
GLUCOSE SERPL-MCNC: 98 MG/DL (ref 70–99)
GLUCOSE UR STRIP.AUTO-MCNC: NEGATIVE MG/DL
HCT VFR BLD AUTO: 40.1 % (ref 40.5–52.5)
HGB BLD-MCNC: 13.6 G/DL (ref 13.5–17.5)
HGB UR QL STRIP.AUTO: NEGATIVE
KETONES UR STRIP.AUTO-MCNC: NEGATIVE MG/DL
LEUKOCYTE ESTERASE UR QL STRIP.AUTO: NEGATIVE
LIPASE SERPL-CCNC: 50 U/L (ref 13–60)
LYMPHOCYTES # BLD: 1.5 K/UL (ref 1–5.1)
LYMPHOCYTES NFR BLD: 23.9 %
MCH RBC QN AUTO: 34 PG (ref 26–34)
MCHC RBC AUTO-ENTMCNC: 33.8 G/DL (ref 31–36)
MCV RBC AUTO: 100.7 FL (ref 80–100)
MONOCYTES # BLD: 0.9 K/UL (ref 0–1.3)
MONOCYTES NFR BLD: 13.7 %
NEUTROPHILS # BLD: 3.9 K/UL (ref 1.7–7.7)
NEUTROPHILS NFR BLD: 61.2 %
NITRITE UR QL STRIP.AUTO: NEGATIVE
PH UR STRIP.AUTO: 6.5 [PH] (ref 5–8)
PLATELET # BLD AUTO: 210 K/UL (ref 135–450)
PMV BLD AUTO: 7.7 FL (ref 5–10.5)
POTASSIUM SERPL-SCNC: 4 MMOL/L (ref 3.5–5.1)
PROT SERPL-MCNC: 7 G/DL (ref 6.4–8.2)
PROT UR STRIP.AUTO-MCNC: 30 MG/DL
RBC # BLD AUTO: 3.98 M/UL (ref 4.2–5.9)
RBC #/AREA URNS HPF: ABNORMAL /HPF (ref 0–4)
SODIUM SERPL-SCNC: 142 MMOL/L (ref 136–145)
SP GR UR STRIP.AUTO: >=1.03 (ref 1–1.03)
UA DIPSTICK W REFLEX MICRO PNL UR: YES
URN SPEC COLLECT METH UR: ABNORMAL
UROBILINOGEN UR STRIP-ACNC: 0.2 E.U./DL
WBC # BLD AUTO: 6.4 K/UL (ref 4–11)
WBC #/AREA URNS HPF: ABNORMAL /HPF (ref 0–5)

## 2024-11-05 PROCEDURE — 6360000002 HC RX W HCPCS: Performed by: NURSE PRACTITIONER

## 2024-11-05 PROCEDURE — 81001 URINALYSIS AUTO W/SCOPE: CPT

## 2024-11-05 PROCEDURE — 80053 COMPREHEN METABOLIC PANEL: CPT

## 2024-11-05 PROCEDURE — 99285 EMERGENCY DEPT VISIT HI MDM: CPT

## 2024-11-05 PROCEDURE — 83690 ASSAY OF LIPASE: CPT

## 2024-11-05 PROCEDURE — 85025 COMPLETE CBC W/AUTO DIFF WBC: CPT

## 2024-11-05 PROCEDURE — 96375 TX/PRO/DX INJ NEW DRUG ADDON: CPT

## 2024-11-05 PROCEDURE — 86850 RBC ANTIBODY SCREEN: CPT

## 2024-11-05 PROCEDURE — 96374 THER/PROPH/DIAG INJ IV PUSH: CPT

## 2024-11-05 PROCEDURE — 6360000004 HC RX CONTRAST MEDICATION: Performed by: NURSE PRACTITIONER

## 2024-11-05 PROCEDURE — 86901 BLOOD TYPING SEROLOGIC RH(D): CPT

## 2024-11-05 PROCEDURE — 74177 CT ABD & PELVIS W/CONTRAST: CPT

## 2024-11-05 PROCEDURE — 36415 COLL VENOUS BLD VENIPUNCTURE: CPT

## 2024-11-05 PROCEDURE — 93005 ELECTROCARDIOGRAM TRACING: CPT | Performed by: EMERGENCY MEDICINE

## 2024-11-05 PROCEDURE — 86900 BLOOD TYPING SEROLOGIC ABO: CPT

## 2024-11-05 RX ORDER — ONDANSETRON 2 MG/ML
4 INJECTION INTRAMUSCULAR; INTRAVENOUS ONCE
Status: COMPLETED | OUTPATIENT
Start: 2024-11-05 | End: 2024-11-05

## 2024-11-05 RX ORDER — IOPAMIDOL 755 MG/ML
75 INJECTION, SOLUTION INTRAVASCULAR
Status: COMPLETED | OUTPATIENT
Start: 2024-11-05 | End: 2024-11-05

## 2024-11-05 RX ORDER — MORPHINE SULFATE 4 MG/ML
4 INJECTION INTRAVENOUS ONCE
Status: COMPLETED | OUTPATIENT
Start: 2024-11-05 | End: 2024-11-05

## 2024-11-05 RX ORDER — ESOMEPRAZOLE MAGNESIUM 40 MG/1
40 CAPSULE, DELAYED RELEASE ORAL
Qty: 30 CAPSULE | Refills: 0 | Status: SHIPPED | OUTPATIENT
Start: 2024-11-05

## 2024-11-05 RX ORDER — DIPHENHYDRAMINE HYDROCHLORIDE 50 MG/ML
50 INJECTION INTRAMUSCULAR; INTRAVENOUS ONCE
Status: COMPLETED | OUTPATIENT
Start: 2024-11-05 | End: 2024-11-05

## 2024-11-05 RX ADMIN — IOPAMIDOL 75 ML: 755 INJECTION, SOLUTION INTRAVENOUS at 18:49

## 2024-11-05 RX ADMIN — DIPHENHYDRAMINE HYDROCHLORIDE 50 MG: 50 INJECTION INTRAMUSCULAR; INTRAVENOUS at 18:31

## 2024-11-05 RX ADMIN — ONDANSETRON 4 MG: 2 INJECTION INTRAMUSCULAR; INTRAVENOUS at 18:34

## 2024-11-05 RX ADMIN — MORPHINE SULFATE 4 MG: 4 INJECTION INTRAVENOUS at 18:40

## 2024-11-05 ASSESSMENT — PAIN SCALES - GENERAL
PAINLEVEL_OUTOF10: 3
PAINLEVEL_OUTOF10: 3

## 2024-11-05 ASSESSMENT — ENCOUNTER SYMPTOMS
RESPIRATORY NEGATIVE: 1
NAUSEA: 1
ABDOMINAL PAIN: 1
VOMITING: 0
BLOOD IN STOOL: 1

## 2024-11-05 ASSESSMENT — PAIN DESCRIPTION - DESCRIPTORS: DESCRIPTORS: ACHING

## 2024-11-05 ASSESSMENT — PAIN DESCRIPTION - LOCATION
LOCATION: ABDOMEN
LOCATION: ABDOMEN

## 2024-11-05 ASSESSMENT — PAIN - FUNCTIONAL ASSESSMENT: PAIN_FUNCTIONAL_ASSESSMENT: 0-10

## 2024-11-05 ASSESSMENT — PAIN DESCRIPTION - ORIENTATION: ORIENTATION: LEFT

## 2024-11-05 NOTE — ED TRIAGE NOTES
Pt c/o bright red blood in stool on Sunday, states he has not had a BM x 2 days. C/o lower left sided abd pain since Thursday and nausea without vomiting. Hx of gastritis, diverticulosis, bleeding ulcers, and pre cancerous polyps. Also c/o dizziness, hypertension, and fatigue. Called GI yesterday and never received call back

## 2024-11-06 ENCOUNTER — CARE COORDINATION (OUTPATIENT)
Dept: CARE COORDINATION | Age: 59
End: 2024-11-06

## 2024-11-06 NOTE — CARE COORDINATION
Ambulatory Care Coordination Note     2024 11:49 AM     Patient Current Location:  Home: [address] [place] [id number]     This patient was received as a referral from ED follow up.    ACM contacted the patient by telephone. Verified name and  with patient as identifiers. Provided introduction to self, and explanation of the ACM role.   Patient declined care management services at this time.          ACM: Angeles Laws RN     Challenges to be reviewed by the provider   Additional needs identified to be addressed with provider No  none               Method of communication with provider: none.    Utilization: Has the patient been seen in the ED since your last call? Yes,   Discharge Date: 24   Discharge Facility: [facility]  Reason for ED Visit: rectal bleeding   Visit Diagnosis: rectal bleeding     Number of ED visits in the last 6 months: 1      Do you have any ongoing symptoms? Yes, there has been no change in my symptoms.   Current symptoms: abdominal pain.    Did you call your PCP prior to going to the ED? No, did not call the PCP office.     Review of Discharge Instructions:   [x] AVS discharge instructions  [x] Right Care, Right Place, Right Time document  [x] Medication changes  [x] Follow up appointments  [x] Referral follow up   []        Care Summary Note:     ACM made care coordination outreach related to ED follow up. Patient very pleasant on the phone while conversing with ACM. Patient reported that he went to the ED related to abdominal pain. AVS reviewed. Symptoms have improved slightly. Taking new medication from ED. Scheduled GI appointment for next week, 24. Patient reported that he will schedule PCP appointment. Politely declined assistance with scheduling. Patient monitors his blood pressure at home and keeps a log. Denied any other questions or concerns at this time. Discussed with patient signs and symptoms to monitor and importance of reporting

## 2024-11-06 NOTE — ED PROVIDER NOTES
ED Attending Attestation Note     Date of evaluation: 11/5/2024    This patient was seen by the advance practice provider.  I have seen and examined the patient, agree with the workup, evaluation, management and diagnosis. The care plan has been discussed.  I have reviewed the ECG and concur with the SANDEEP's interpretation.  My assessment reveals a well-nourished appearing middle-aged male lying on the stretcher comfortable and in no apparent distress, complains of bright red blood per rectum today and over the last few days.  He is not on blood thinners, endorses recent NSAID use with diclofenac, does not drink alcohol.  His abdomen is soft, mild generalized without rebound or guarding, no palpable organomegaly.     Alonso Farmer MD  11/05/24 2028    
motion and neck supple.   Skin:     General: Skin is warm.   Neurological:      Mental Status: He is alert and oriented to person, place, and time.   Psychiatric:         Mood and Affect: Mood normal.         Behavior: Behavior normal.               Sunshine Vivas, JORGE ALBERTO - CNP  11/05/24 [id number]

## 2024-11-06 NOTE — DISCHARGE INSTRUCTIONS
- please follow up with GI for further evaluation and management of GI bleeding  - tylenol as needed for pain  - please followup with your PCP for ongoing evaluation of your blood pressure    - return to the ED for worsening symptoms/concerns

## 2024-11-06 NOTE — ED NOTES
Patient prepared for and ready to be discharged. Dressed in clothes and given belongings.  IV removed, pt tolerated well, no complications.  Patient discharged at this time in no acute distress after pt verbalized understanding of discharge instructions.   Reviewed medications, and when to return to the ED with patient. Encouraged follow up with GI  Patient walked to lobby, Family to take patient home.      Escuadra, Marylee, RN  11/05/24 [id number]

## 2024-11-07 ENCOUNTER — HOSPITAL ENCOUNTER (OUTPATIENT)
Dept: CT IMAGING | Age: 59
Discharge: HOME | End: 2024-11-07
Payer: COMMERCIAL

## 2024-11-07 DIAGNOSIS — R22.0 SWELLING OF LEFT SIDE OF FACE: ICD-10-CM

## 2024-11-07 PROCEDURE — 6360000004 HC RX CONTRAST MEDICATION: Performed by: FAMILY MEDICINE

## 2024-11-07 PROCEDURE — 70487 CT MAXILLOFACIAL W/DYE: CPT

## 2024-11-07 RX ORDER — IOPAMIDOL 755 MG/ML
75 INJECTION, SOLUTION INTRAVASCULAR
Status: COMPLETED | OUTPATIENT
Start: 2024-11-07 | End: 2024-11-07

## 2024-11-07 RX ADMIN — IOPAMIDOL 75 ML: 755 INJECTION, SOLUTION INTRAVENOUS at 17:20

## 2024-11-11 ENCOUNTER — PATIENT MESSAGE (OUTPATIENT)
Dept: FAMILY MEDICINE CLINIC | Age: 59
End: 2024-11-11

## 2024-11-15 DIAGNOSIS — L03.211 CELLULITIS OF CHIN: Primary | ICD-10-CM

## 2024-11-15 RX ORDER — SULFAMETHOXAZOLE AND TRIMETHOPRIM 800; 160 MG/1; MG/1
1 TABLET ORAL 2 TIMES DAILY
Qty: 14 TABLET | Refills: 0 | Status: SHIPPED | OUTPATIENT
Start: 2024-11-15 | End: 2024-11-22

## 2024-11-18 ENCOUNTER — TELEPHONE (OUTPATIENT)
Dept: ENT CLINIC | Age: 59
End: 2024-11-18

## 2024-11-18 NOTE — TELEPHONE ENCOUNTER
Pt wife calling, he has new pt appt with Dr Pierce on 11/27/24 for facial swelling; he was seen in hospital for facial swelling and had a CT. Wife states he was diagnosed with cellulitis and his pcp prescribed bactrim. She wants to know if this antibiotic is strong enough to treat this. Please call to advise.

## 2024-11-27 ENCOUNTER — OFFICE VISIT (OUTPATIENT)
Dept: ENT CLINIC | Age: 59
End: 2024-11-27
Payer: COMMERCIAL

## 2024-11-27 VITALS
HEIGHT: 73 IN | OXYGEN SATURATION: 97 % | WEIGHT: 261 LBS | DIASTOLIC BLOOD PRESSURE: 93 MMHG | HEART RATE: 86 BPM | BODY MASS INDEX: 34.59 KG/M2 | SYSTOLIC BLOOD PRESSURE: 132 MMHG

## 2024-11-27 DIAGNOSIS — H92.03 OTALGIA OF BOTH EARS: ICD-10-CM

## 2024-11-27 DIAGNOSIS — R59.0 CERVICAL LYMPHADENOPATHY: Primary | ICD-10-CM

## 2024-11-27 PROCEDURE — 3075F SYST BP GE 130 - 139MM HG: CPT | Performed by: OTOLARYNGOLOGY

## 2024-11-27 PROCEDURE — 99203 OFFICE O/P NEW LOW 30 MIN: CPT | Performed by: OTOLARYNGOLOGY

## 2024-11-27 PROCEDURE — 3080F DIAST BP >= 90 MM HG: CPT | Performed by: OTOLARYNGOLOGY

## 2024-11-27 NOTE — PROGRESS NOTES
[facility]  DIVISION OF OTOLARYNGOLOGY- HEAD & NECK SURGERY  CONSULT      Patient Name: Jeovany Anthony  Medical Record Number:  [id number]  Primary Care Physician:  Bry Fuchs MD  Date of Consultation: 11/27/2024    Chief Complaint: Neck issues        HISTORY OF PRESENT ILLNESS  Jeovany is a(n) 58 y.o. male who presents for evaluation of a lesion around his left ear.  The patient said he had some swelling and discomfort around the left ear several weeks ago.  It seemed to respond to antibiotics.  He still gets occasional discomfort.  He does have a history of a rheumatologic disorder.  He was told it was likely rheumatoid arthritis.  He is also had bilateral temporal artery biopsies to rule out temporal arteritis             REVIEW OF SYSTEMS  As above    PHYSICAL EXAM  GENERAL: No Acute Distress, Alert and Oriented, no Hoarseness, strong voice  EYES: EOMI, Anti-icteric  HENT:   Head: Normocephalic and atraumatic.   Face:  Symmetric, facial nerve intact, no sinus tenderness  Right Ear: Normal external ear, normal external auditory canal, intact tympanic membrane with normal mobility and aerated middle ear  Left Ear: Normal external ear, normal external auditory canal, intact tympanic membrane with normal mobility and aerated middle ear  Mouth/Oral Cavity:  normal lips, Uvula is midline, no mucosal lesions, no trismus  Oropharynx/Larynx:  normal oropharynx  Nose:Normal external nasal appearance.    NECK: Normal range of motion, no thyromegaly, trachea is midline, no lymphadenopathy, no neck masses, no crepitus          RADIOLOGY  Summary of findings:  I reviewed the maxillofacial CT from November 7.  I do not appreciate any middle ear or mastoid disease.  No obvious cervical lymphadenopathy          ASSESSMENT/PLAN  1. Cervical lymphadenopathy  It sounds that he had an inflamed cervical lymph node that responded to antibiotics.  I do not think anything else needs to be done unless it recurs.    2.

## 2024-12-03 ENCOUNTER — TELEPHONE (OUTPATIENT)
Dept: FAMILY MEDICINE CLINIC | Age: 59
End: 2024-12-03

## 2024-12-03 DIAGNOSIS — F51.04 PSYCHOPHYSIOLOGIC INSOMNIA: ICD-10-CM

## 2024-12-03 DIAGNOSIS — F41.9 ANXIETY: ICD-10-CM

## 2024-12-03 RX ORDER — ESCITALOPRAM OXALATE 10 MG/1
10 TABLET ORAL DAILY
Qty: 30 TABLET | Refills: 0 | Status: SHIPPED | OUTPATIENT
Start: 2024-12-03

## 2024-12-05 ENCOUNTER — TELEPHONE (OUTPATIENT)
Dept: FAMILY MEDICINE CLINIC | Age: 59
End: 2024-12-05

## 2024-12-05 NOTE — TELEPHONE ENCOUNTER
Letty reached out the patient and rescheduled him for 1030 to allow for enough time to complete preop

## 2024-12-05 NOTE — TELEPHONE ENCOUNTER
----- Message from Hugo NEAL sent at 12/5/2024  3:42 PM EST -----  Regarding: [facility] Appointment Request  [facility] Appointment Request    Patient needs appointment for [facility] Appointment Type: Pre-Op Visit.    Patient Requested Dates(s):any dates prior to January 9th excepts 30th of december  Patient Requested Time:Afternoon  Provider Name:Bry Fuchs MD      Reason for Appointment Request: Established Patient - Available appointments did not meet patient need  --------------------------------------------------------------------------------------------------------------------------    Relationship to Patient: Self     Call Back Information: OK to leave message on voicemail  Preferred Call Back Number: Phone [phone]

## 2024-12-09 RX ORDER — ESOMEPRAZOLE MAGNESIUM 40 MG/1
40 CAPSULE, DELAYED RELEASE ORAL
Qty: 30 CAPSULE | Refills: 0 | Status: SHIPPED | OUTPATIENT
Start: 2024-12-09

## 2024-12-12 DIAGNOSIS — E66.01 SEVERE OBESITY (BMI 35.0-39.9) WITH COMORBIDITY: ICD-10-CM

## 2024-12-12 RX ORDER — TIRZEPATIDE 10 MG/.5ML
INJECTION, SOLUTION SUBCUTANEOUS
Qty: 6 ML | Refills: 0 | Status: SHIPPED | OUTPATIENT
Start: 2024-12-12

## 2024-12-20 ENCOUNTER — PATIENT MESSAGE (OUTPATIENT)
Dept: FAMILY MEDICINE CLINIC | Age: 59
End: 2024-12-20

## 2024-12-20 ENCOUNTER — OFFICE VISIT (OUTPATIENT)
Dept: FAMILY MEDICINE CLINIC | Age: 59
End: 2024-12-20

## 2024-12-20 ENCOUNTER — TELEPHONE (OUTPATIENT)
Dept: ADMINISTRATIVE | Age: 59
End: 2024-12-20

## 2024-12-20 VITALS
RESPIRATION RATE: 18 BRPM | DIASTOLIC BLOOD PRESSURE: 82 MMHG | WEIGHT: 257 LBS | BODY MASS INDEX: 34.06 KG/M2 | TEMPERATURE: 97.8 F | SYSTOLIC BLOOD PRESSURE: 126 MMHG | HEIGHT: 73 IN | HEART RATE: 80 BPM | OXYGEN SATURATION: 98 %

## 2024-12-20 DIAGNOSIS — M17.12 PRIMARY OSTEOARTHRITIS OF LEFT KNEE: Primary | ICD-10-CM

## 2024-12-20 DIAGNOSIS — M35.3 PMR (POLYMYALGIA RHEUMATICA) (HCC): ICD-10-CM

## 2024-12-20 DIAGNOSIS — F51.04 PSYCHOPHYSIOLOGIC INSOMNIA: ICD-10-CM

## 2024-12-20 DIAGNOSIS — E66.09 CLASS 1 OBESITY DUE TO EXCESS CALORIES WITH SERIOUS COMORBIDITY AND BODY MASS INDEX (BMI) OF 33.0 TO 33.9 IN ADULT: ICD-10-CM

## 2024-12-20 DIAGNOSIS — R79.89 LOW TESTOSTERONE: ICD-10-CM

## 2024-12-20 DIAGNOSIS — E66.811 CLASS 1 OBESITY DUE TO EXCESS CALORIES WITH SERIOUS COMORBIDITY AND BODY MASS INDEX (BMI) OF 33.0 TO 33.9 IN ADULT: ICD-10-CM

## 2024-12-20 DIAGNOSIS — E78.00 HYPERCHOLESTEREMIA: ICD-10-CM

## 2024-12-20 DIAGNOSIS — Z01.818 PREOP EXAMINATION: ICD-10-CM

## 2024-12-20 DIAGNOSIS — F41.9 ANXIETY: ICD-10-CM

## 2024-12-20 LAB
ALBUMIN SERPL-MCNC: 4.7 G/DL (ref 3.4–5)
ALBUMIN/GLOB SERPL: 2.4 {RATIO} (ref 1.1–2.2)
ALP SERPL-CCNC: 76 U/L (ref 40–129)
ALT SERPL-CCNC: 60 U/L (ref 10–40)
ANION GAP SERPL CALCULATED.3IONS-SCNC: 10 MMOL/L (ref 3–16)
APTT BLD: 27.6 SEC (ref 22.1–36.4)
AST SERPL-CCNC: 39 U/L (ref 15–37)
BASOPHILS # BLD: 0 K/UL (ref 0–0.2)
BASOPHILS NFR BLD: 0.7 %
BILIRUB SERPL-MCNC: 0.8 MG/DL (ref 0–1)
BILIRUBIN, POC: NEGATIVE
BLOOD URINE, POC: NEGATIVE
BUN SERPL-MCNC: 16 MG/DL (ref 7–20)
CALCIUM SERPL-MCNC: 9.7 MG/DL (ref 8.3–10.6)
CHLORIDE SERPL-SCNC: 105 MMOL/L (ref 99–110)
CHOLEST SERPL-MCNC: 296 MG/DL (ref 0–199)
CLARITY, POC: CLEAR
CO2 SERPL-SCNC: 27 MMOL/L (ref 21–32)
COLOR, POC: YELLOW
CREAT SERPL-MCNC: 1.3 MG/DL (ref 0.9–1.3)
DEPRECATED RDW RBC AUTO: 14.7 % (ref 12.4–15.4)
EOSINOPHIL # BLD: 0.1 K/UL (ref 0–0.6)
EOSINOPHIL NFR BLD: 1.3 %
GFR SERPLBLD CREATININE-BSD FMLA CKD-EPI: 64 ML/MIN/{1.73_M2}
GLUCOSE SERPL-MCNC: 86 MG/DL (ref 70–99)
GLUCOSE URINE, POC: NEGATIVE MG/DL
HCT VFR BLD AUTO: 39.6 % (ref 40.5–52.5)
HDLC SERPL-MCNC: 64 MG/DL (ref 40–60)
HGB BLD-MCNC: 13.6 G/DL (ref 13.5–17.5)
INR PPP: 0.93 (ref 0.85–1.15)
KETONES, POC: NEGATIVE MG/DL
LDLC SERPL CALC-MCNC: 203 MG/DL
LEUKOCYTE EST, POC: NEGATIVE
LYMPHOCYTES # BLD: 1.5 K/UL (ref 1–5.1)
LYMPHOCYTES NFR BLD: 34.1 %
MCH RBC QN AUTO: 33.9 PG (ref 26–34)
MCHC RBC AUTO-ENTMCNC: 34.3 G/DL (ref 31–36)
MCV RBC AUTO: 98.8 FL (ref 80–100)
MONOCYTES # BLD: 0.7 K/UL (ref 0–1.3)
MONOCYTES NFR BLD: 15.2 %
MRSA DNA SPEC QL NAA+PROBE: NORMAL
NEUTROPHILS # BLD: 2.2 K/UL (ref 1.7–7.7)
NEUTROPHILS NFR BLD: 48.7 %
NITRITE, POC: NEGATIVE
PH, POC: 6.5
PLATELET # BLD AUTO: 258 K/UL (ref 135–450)
PMV BLD AUTO: 8.1 FL (ref 5–10.5)
POTASSIUM SERPL-SCNC: 4 MMOL/L (ref 3.5–5.1)
PROT SERPL-MCNC: 6.7 G/DL (ref 6.4–8.2)
PROTEIN, POC: NORMAL MG/DL
PROTHROMBIN TIME: 12.7 SEC (ref 11.9–14.9)
RBC # BLD AUTO: 4 M/UL (ref 4.2–5.9)
SODIUM SERPL-SCNC: 142 MMOL/L (ref 136–145)
SPECIFIC GRAVITY, POC: >=1.03
TRIGL SERPL-MCNC: 147 MG/DL (ref 0–150)
UROBILINOGEN, POC: 0.2 MG/DL
VLDLC SERPL CALC-MCNC: 29 MG/DL
WBC # BLD AUTO: 4.5 K/UL (ref 4–11)

## 2024-12-20 RX ORDER — TIRZEPATIDE 7.5 MG/.5ML
7.5 INJECTION, SOLUTION SUBCUTANEOUS WEEKLY
Qty: 2 ML | Refills: 0 | Status: SHIPPED | OUTPATIENT
Start: 2024-12-20

## 2024-12-20 RX ORDER — TIRZEPATIDE 5 MG/.5ML
5 INJECTION, SOLUTION SUBCUTANEOUS WEEKLY
Qty: 2 ML | Refills: 0 | Status: SHIPPED | OUTPATIENT
Start: 2024-12-20

## 2024-12-20 RX ORDER — TIRZEPATIDE 2.5 MG/.5ML
2.5 INJECTION, SOLUTION SUBCUTANEOUS WEEKLY
Qty: 2 ML | Refills: 0 | Status: SHIPPED | OUTPATIENT
Start: 2024-12-20

## 2024-12-20 NOTE — ASSESSMENT & PLAN NOTE
Chronic issue being managed by new rheumatologist.  Reconciled recent med changes.  Continue with management by them follow-up in 3 months.

## 2024-12-20 NOTE — ASSESSMENT & PLAN NOTE
Controlled.  Patient stopping escitalopram due to worsening symptoms.  Increasing mirtazapine to help with insomnia.  Declines additional treatment for anxiety.  Follow-up in 3 months.

## 2024-12-20 NOTE — TELEPHONE ENCOUNTER
Submitted PA for Zepbound 2.5MG/0.5ML pen-injectors   Via CMM Key: [id number] STATUS: PENDING.    Follow up done daily; if no decision with in three days we will refax.  If another three days goes by with no decision will call the insurance for status.

## 2024-12-20 NOTE — ASSESSMENT & PLAN NOTE
Controlled.  Continues rosuvastatin.  Fasting labs today.  Follow-up in 3 months.    Orders:    Lipid Panel; Future

## 2024-12-20 NOTE — ASSESSMENT & PLAN NOTE
Undetermined control.  Patient had started testosterone but stopped for a few weeks due to increased blood pressure.  Now normotensive.  Plans on starting again.  Hold off on rechecking with labs.  Recheck testosterone 2 months.  Follow-up in 3 months for weight loss.

## 2024-12-20 NOTE — PROGRESS NOTES
no rales.   Abdominal: Soft. Normal aorta and bowel sounds are normal. He exhibits no distension and no mass. There is no hepatosplenomegaly. No tenderness.   Musculoskeletal: Left knee tenderness without swelling.  Neurological: He is alert and oriented to person, place, and time. He has normal strength. No cranial nerve deficit or sensory deficit. Coordination and gait normal.   Skin: Skin is warm and dry. No rash noted. No erythema.   Psychiatric: He has a normal mood and affect. His behavior is normal.     EKG Interpretation:  Reviewed EKG completed last month by cardiology. No indication to repeat.    Lab Review Performing today        Assessment:       58 y.o. patient with planned surgery as above.    Known risk factors for perioperative complications: Hypertension, Obstructive sleep apnea  Current medications which may produce withdrawal symptoms if withheld perioperatively: none      Plan:     Assessment & Plan  Primary osteoarthritis of left knee    Cleared for surgery    Orders:    Comprehensive Metabolic Panel; Future    Hemoglobin A1C; Future    CBC with Auto Differential; Future    APTT; Future    Protime-INR; Future    POCT Urinalysis no Micro    Culture, Urine; Future    MRSA DNA Probe, Nasal; Future    Preop examination   1. Preoperative workup as follows: hemoglobin, hematocrit, electrolytes, creatinine, glucose, liver function studies, coagulation studies, urinalysis (urinary tract instrumentation planned), MRSA nasal swab  2. Change in medication regimen before surgery: Discontinue ASA 7 days before surgery, Discontinue NSAIDs (diclofenac) 7 days before surgery, Discontinue vitamins and supplements 7 days before surgery.  Patient already is not taking GLP-1 agonist and will restart following surgery if approved.  3. Prophylaxis for cardiac events with perioperative beta-blockers: Not indicated  ACC/AHA indications for pre-operative beta-blocker use:    Vascular surgery with history of postitive

## 2024-12-21 LAB
BACTERIA UR CULT: NORMAL
EST. AVERAGE GLUCOSE BLD GHB EST-MCNC: 99.7 MG/DL
HBA1C MFR BLD: 5.1 %

## 2024-12-23 DIAGNOSIS — E78.00 HYPERCHOLESTEREMIA: ICD-10-CM

## 2024-12-23 RX ORDER — ROSUVASTATIN CALCIUM 20 MG/1
20 TABLET, COATED ORAL DAILY
Qty: 90 TABLET | Refills: 3 | Status: SHIPPED | OUTPATIENT
Start: 2024-12-23

## 2024-12-23 RX ORDER — ROSUVASTATIN CALCIUM 40 MG/1
40 TABLET, COATED ORAL DAILY
Qty: 30 TABLET | Refills: 5 | Status: SHIPPED | OUTPATIENT
Start: 2024-12-23 | End: 2024-12-23 | Stop reason: SDUPTHER

## 2024-12-23 RX ORDER — ROSUVASTATIN CALCIUM 40 MG/1
40 TABLET, COATED ORAL DAILY
Qty: 90 TABLET | OUTPATIENT
Start: 2024-12-23

## 2024-12-26 ENCOUNTER — TELEPHONE (OUTPATIENT)
Dept: FAMILY MEDICINE CLINIC | Age: 59
End: 2024-12-26

## 2024-12-26 NOTE — TELEPHONE ENCOUNTER
The medication is APPROVED THRU 06/12/2025    If this requires a response please respond to the pool ( P MHC[facility] MEDICATION PRE-AUTH).      Thank you please advise patient.

## 2024-12-26 NOTE — TELEPHONE ENCOUNTER
The requested info was submitted thru [facility] and was still denied. We can submit an appeal if the provider would like or the provider can contact the clinical reviewer at [phone] before related appeals have been initiated.     I contacted Michael and was advised there is no documentation on the office note with the requested info (records that you will use the requested drug in addition to making changes to your eating and exercise habits [lifestyle modifications]).     This has to be either in the office visit note from 12/20 or they will accept the documentation this telephone message.  Once completed please submit back to PA pool to send to Michael.     Will fax to [facility] ref # [id number] fax # [phone]  Will hear from reviewer within 48 business hours if add'l conversation is needed with provider.      Thank you.

## 2024-12-26 NOTE — TELEPHONE ENCOUNTER
Katie from [facility] was calling to say there is no need for the peer to peer review she just approved it. This was for the Zepbound

## 2024-12-26 NOTE — TELEPHONE ENCOUNTER
I am a little confused about why this got declined based upon the letter itself.  It looks like all that needs to be stated is that the patient will be doing lifestyle modifications in addition to the medication.  I have stated multiple times in his documentation that he has tried and failed regular diet and exercise.  Is there something that I am missing?

## 2025-01-13 ENCOUNTER — PATIENT MESSAGE (OUTPATIENT)
Dept: FAMILY MEDICINE CLINIC | Age: 60
End: 2025-01-13

## 2025-01-13 DIAGNOSIS — M79.89 LEG SWELLING: ICD-10-CM

## 2025-01-13 DIAGNOSIS — M79.89 LEG SWELLING: Primary | ICD-10-CM

## 2025-01-13 RX ORDER — SPIRONOLACTONE 50 MG/1
50 TABLET, FILM COATED ORAL DAILY PRN
Qty: 30 TABLET | Refills: 3 | Status: SHIPPED | OUTPATIENT
Start: 2025-01-13 | End: 2025-01-14

## 2025-01-14 RX ORDER — SPIRONOLACTONE 50 MG/1
TABLET, FILM COATED ORAL
Qty: 90 TABLET | Refills: 0 | Status: SHIPPED | OUTPATIENT
Start: 2025-01-14

## 2025-01-15 ENCOUNTER — TRANSCRIBE ORDERS (OUTPATIENT)
Dept: ADMINISTRATIVE | Age: 60
End: 2025-01-15

## 2025-01-15 DIAGNOSIS — M79.662 PAIN OF LEFT LOWER LEG: Primary | ICD-10-CM

## 2025-01-16 ENCOUNTER — HOSPITAL ENCOUNTER (OUTPATIENT)
Dept: VASCULAR LAB | Age: 60
Discharge: HOME | End: 2025-01-18
Attending: ORTHOPAEDIC SURGERY
Payer: COMMERCIAL

## 2025-01-16 DIAGNOSIS — M79.662 PAIN OF LEFT LOWER LEG: ICD-10-CM

## 2025-01-16 PROCEDURE — 93971 EXTREMITY STUDY: CPT

## 2025-01-28 SDOH — ECONOMIC STABILITY: FOOD INSECURITY: WITHIN THE PAST 12 MONTHS, THE FOOD YOU BOUGHT JUST DIDN'T LAST AND YOU DIDN'T HAVE MONEY TO GET MORE.: NEVER TRUE

## 2025-01-28 SDOH — ECONOMIC STABILITY: FOOD INSECURITY: WITHIN THE PAST 12 MONTHS, YOU WORRIED THAT YOUR FOOD WOULD RUN OUT BEFORE YOU GOT MONEY TO BUY MORE.: NEVER TRUE

## 2025-01-28 SDOH — ECONOMIC STABILITY: INCOME INSECURITY: IN THE LAST 12 MONTHS, WAS THERE A TIME WHEN YOU WERE NOT ABLE TO PAY THE MORTGAGE OR RENT ON TIME?: NO

## 2025-01-28 ASSESSMENT — PATIENT HEALTH QUESTIONNAIRE - PHQ9
2. FEELING DOWN, DEPRESSED OR HOPELESS: NOT AT ALL
2. FEELING DOWN, DEPRESSED OR HOPELESS: NOT AT ALL
SUM OF ALL RESPONSES TO PHQ QUESTIONS 1-9: 0
SUM OF ALL RESPONSES TO PHQ QUESTIONS 1-9: 0
SUM OF ALL RESPONSES TO PHQ9 QUESTIONS 1 & 2: 0
SUM OF ALL RESPONSES TO PHQ QUESTIONS 1-9: 0
SUM OF ALL RESPONSES TO PHQ QUESTIONS 1-9: 0
1. LITTLE INTEREST OR PLEASURE IN DOING THINGS: NOT AT ALL
1. LITTLE INTEREST OR PLEASURE IN DOING THINGS: NOT AT ALL
SUM OF ALL RESPONSES TO PHQ9 QUESTIONS 1 & 2: 0

## 2025-01-29 ENCOUNTER — OFFICE VISIT (OUTPATIENT)
Dept: FAMILY MEDICINE CLINIC | Age: 60
End: 2025-01-29

## 2025-01-29 VITALS
HEIGHT: 73 IN | OXYGEN SATURATION: 94 % | DIASTOLIC BLOOD PRESSURE: 84 MMHG | SYSTOLIC BLOOD PRESSURE: 128 MMHG | BODY MASS INDEX: 34.19 KG/M2 | HEART RATE: 80 BPM | WEIGHT: 258 LBS

## 2025-01-29 DIAGNOSIS — Z86.718 ENCOUNTER FOR FOLLOW-UP OF ACUTE DEEP VEIN THROMBOSIS (DVT) OF LEFT LOWER EXTREMITY: Primary | ICD-10-CM

## 2025-01-29 DIAGNOSIS — Z09 ENCOUNTER FOR FOLLOW-UP OF ACUTE DEEP VEIN THROMBOSIS (DVT) OF LEFT LOWER EXTREMITY: Primary | ICD-10-CM

## 2025-01-29 DIAGNOSIS — M06.9 RHEUMATOID ARTHRITIS INVOLVING MULTIPLE SITES, UNSPECIFIED WHETHER RHEUMATOID FACTOR PRESENT (HCC): ICD-10-CM

## 2025-01-29 RX ORDER — CELECOXIB 200 MG/1
200 CAPSULE ORAL 2 TIMES DAILY
COMMUNITY

## 2025-01-29 RX ORDER — APIXABAN 5 MG (74)
5 KIT ORAL 2 TIMES DAILY
COMMUNITY
Start: 2025-01-16 | End: 2025-01-29 | Stop reason: ALTCHOICE

## 2025-01-29 ASSESSMENT — ENCOUNTER SYMPTOMS
SHORTNESS OF BREATH: 0
COUGH: 0
WHEEZING: 0

## 2025-01-29 NOTE — ASSESSMENT & PLAN NOTE
Chronic.  Following with rheumatology for management.  No changes indicated based off of today's visit.  Did discuss NSAID use with Eliquis and increased bleeding risk.

## 2025-01-29 NOTE — PROGRESS NOTES
Jeovany Anthony (:  1965) is a 59 y.o. male,Established patient, here for evaluation of the following chief complaint(s):  Other (FOLLOW UP ON DVT PATIENT CURRENTLY ON ELIQUIS)      ASSESSMENT/PLAN:  Assessment & Plan  Encounter for follow-up of acute deep vein thrombosis (DVT) of left lower extremity    Noted venous Doppler result which did show DVT.  Patient already started Eliquis starter pack.  Sending for an additional 6 months.  Given that this occurred after surgery, should be able to discontinue after 6 months.  Has follow-up scheduled in April for physical already.    Orders:    apixaban (ELIQUIS) 5 MG TABS tablet; Take 1 tablet by mouth 2 times daily    Rheumatoid arthritis involving multiple sites, unspecified whether rheumatoid factor present (HCC)    Chronic.  Following with rheumatology for management.  No changes indicated based off of today's visit.  Did discuss NSAID use with Eliquis and increased bleeding risk.              No follow-ups on file.    SUBJECTIVE/OBJECTIVE:  TAYLOR Alvarenga presents today to discuss anticoagulation:    -Had knee surgery recently.  Following surgery, had discomfort in the left lower extremity as well as some increased swelling.  Knows this is expected with the surgery, but given that he has had procedures on this leg before he felt that this particular scenario was a little different.  Ended up having venous Doppler which showed DVT.  Started Eliquis starter pack.  Made follow-up to discuss continuing Eliquis.  Already has follow-up scheduled in April.      Review of Systems   Constitutional:  Negative for chills and fever.   Respiratory:  Negative for cough, shortness of breath and wheezing.    Cardiovascular:  Positive for leg swelling. Negative for chest pain and palpitations.   Musculoskeletal:  Positive for arthralgias and myalgias.   Psychiatric/Behavioral:  Negative for decreased concentration, dysphoric mood, self-injury, sleep disturbance and suicidal

## 2025-02-19 ASSESSMENT — SLEEP AND FATIGUE QUESTIONNAIRES
HOW LIKELY ARE YOU TO NOD OFF OR FALL ASLEEP WHILE SITTING QUIETLY AFTER LUNCH WITHOUT ALCOHOL: WOULD NEVER DOZE
HOW LIKELY ARE YOU TO NOD OFF OR FALL ASLEEP WHEN YOU ARE A PASSENGER IN A CAR FOR AN HOUR WITHOUT A BREAK: WOULD NEVER DOZE
HOW LIKELY ARE YOU TO NOD OFF OR FALL ASLEEP IN A CAR, WHILE STOPPED FOR A FEW MINUTES IN TRAFFIC: WOULD NEVER DOZE
ESS TOTAL SCORE: 3
HOW LIKELY ARE YOU TO NOD OFF OR FALL ASLEEP WHILE WATCHING TV: WOULD NEVER DOZE
HOW LIKELY ARE YOU TO NOD OFF OR FALL ASLEEP WHILE SITTING AND TALKING TO SOMEONE: WOULD NEVER DOZE
HOW LIKELY ARE YOU TO NOD OFF OR FALL ASLEEP WHILE WATCHING TV: WOULD NEVER DOZE
HOW LIKELY ARE YOU TO NOD OFF OR FALL ASLEEP WHILE SITTING QUIETLY AFTER LUNCH WITHOUT ALCOHOL: WOULD NEVER DOZE
HOW LIKELY ARE YOU TO NOD OFF OR FALL ASLEEP IN A CAR, WHILE STOPPED FOR A FEW MINUTES IN TRAFFIC: WOULD NEVER DOZE
HOW LIKELY ARE YOU TO NOD OFF OR FALL ASLEEP WHILE SITTING AND READING: SLIGHT CHANCE OF DOZING
HOW LIKELY ARE YOU TO NOD OFF OR FALL ASLEEP WHEN YOU ARE A PASSENGER IN A CAR FOR AN HOUR WITHOUT A BREAK: WOULD NEVER DOZE
HOW LIKELY ARE YOU TO NOD OFF OR FALL ASLEEP WHILE SITTING AND READING: SLIGHT CHANCE OF DOZING
HOW LIKELY ARE YOU TO NOD OFF OR FALL ASLEEP WHILE SITTING INACTIVE IN A PUBLIC PLACE: WOULD NEVER DOZE
HOW LIKELY ARE YOU TO NOD OFF OR FALL ASLEEP WHILE SITTING AND TALKING TO SOMEONE: WOULD NEVER DOZE
HOW LIKELY ARE YOU TO NOD OFF OR FALL ASLEEP WHILE LYING DOWN TO REST IN THE AFTERNOON WHEN CIRCUMSTANCES PERMIT: MODERATE CHANCE OF DOZING
HOW LIKELY ARE YOU TO NOD OFF OR FALL ASLEEP WHILE LYING DOWN TO REST IN THE AFTERNOON WHEN CIRCUMSTANCES PERMIT: MODERATE CHANCE OF DOZING
HOW LIKELY ARE YOU TO NOD OFF OR FALL ASLEEP WHILE SITTING INACTIVE IN A PUBLIC PLACE: WOULD NEVER DOZE

## 2025-02-20 ENCOUNTER — OFFICE VISIT (OUTPATIENT)
Dept: SLEEP MEDICINE | Age: 60
End: 2025-02-20
Payer: COMMERCIAL

## 2025-02-20 VITALS
HEART RATE: 78 BPM | DIASTOLIC BLOOD PRESSURE: 80 MMHG | HEIGHT: 73 IN | SYSTOLIC BLOOD PRESSURE: 140 MMHG | TEMPERATURE: 98.9 F | RESPIRATION RATE: 18 BRPM | BODY MASS INDEX: 33.13 KG/M2 | OXYGEN SATURATION: 97 % | WEIGHT: 250 LBS

## 2025-02-20 DIAGNOSIS — Z99.89 DEPENDENCE ON OTHER ENABLING MACHINES AND DEVICES: ICD-10-CM

## 2025-02-20 DIAGNOSIS — I10 PRIMARY HYPERTENSION: ICD-10-CM

## 2025-02-20 DIAGNOSIS — G47.33 OSA ON CPAP: Primary | ICD-10-CM

## 2025-02-20 DIAGNOSIS — E66.811 OBESITY (BMI 30.0-34.9): ICD-10-CM

## 2025-02-20 PROCEDURE — 3077F SYST BP >= 140 MM HG: CPT | Performed by: PSYCHIATRY & NEUROLOGY

## 2025-02-20 PROCEDURE — G2211 COMPLEX E/M VISIT ADD ON: HCPCS | Performed by: PSYCHIATRY & NEUROLOGY

## 2025-02-20 PROCEDURE — 99214 OFFICE O/P EST MOD 30 MIN: CPT | Performed by: PSYCHIATRY & NEUROLOGY

## 2025-02-20 PROCEDURE — 3079F DIAST BP 80-89 MM HG: CPT | Performed by: PSYCHIATRY & NEUROLOGY

## 2025-02-20 NOTE — PROGRESS NOTES
MD LISA Mclean Board Certified in Sleep Medicine  Certified in Behavioral Sleep Medicine  Board Certified in Neurology [facility]  [address]   [address]  [place]  [address]  [phone]   [facility]  [address]  [address]  [place], [place] [address]                      [facility] [place] [facility]  [address] [address] [phone]    Subjective:     Patient ID: Jeovany Anthony is a 59 y.o. male.    Chief Complaint   Patient presents with    Follow-up    Sleep Apnea       HPI:        Jeovany Anthony is a 59 y.o. male was seen today as a follow for obstructive sleep apnea. The patient underwent home sleep testing on 10/16/2024, the overnight registration revealed mild obstructive sleep apnea with apnea hypopnea index of 12.7/hr with lowest O2 saturation of 85%, patient spent about 1.4 minutes below 90% (weight was 264 pounds).     Patient is using the PAP machine about 89% of the time, more than 4 hours a night about  52 %, in total average of 4:30 hours a night in last 83 days.  Currently on PAP at 7.7 cm (5-15), the AHI is only 4.1 events per hour at this pressure. Had knee surgery in January/2025.  Patient improved regarding daytime sleepiness and fatigue, wakes up refreshed in the morning.  The Patient scored Platinum Sleepiness Score: 3 on Platinum Sleepiness Scale ( more than 10 is indicative of daytime sleepiness)   Patient has no problem with PAP pressure or mask, uses nasal pillow.     BP is stable. Has gained 15 pounds since last visit. 264  DOT/CDL - N/A      Previous Report(s)Reviewed: historical medical records         Social History     Socioeconomic History    Marital status:      Spouse name: Not on file    Number of children: Not on file    Years of education: Not on file    Highest education level: Not on file   Occupational History

## 2025-02-25 ENCOUNTER — PATIENT MESSAGE (OUTPATIENT)
Dept: FAMILY MEDICINE CLINIC | Age: 60
End: 2025-02-25

## 2025-02-25 DIAGNOSIS — Z20.828 EXPOSURE TO INFLUENZA: Primary | ICD-10-CM

## 2025-02-25 DIAGNOSIS — J11.1 INFLUENZA: ICD-10-CM

## 2025-02-26 RX ORDER — OSELTAMIVIR PHOSPHATE 75 MG/1
75 CAPSULE ORAL DAILY
Qty: 7 CAPSULE | Refills: 0 | Status: SHIPPED | OUTPATIENT
Start: 2025-02-26 | End: 2025-03-05

## 2025-04-07 ENCOUNTER — OFFICE VISIT (OUTPATIENT)
Dept: FAMILY MEDICINE CLINIC | Age: 60
End: 2025-04-07
Payer: COMMERCIAL

## 2025-04-07 VITALS
SYSTOLIC BLOOD PRESSURE: 124 MMHG | OXYGEN SATURATION: 97 % | WEIGHT: 241 LBS | BODY MASS INDEX: 31.94 KG/M2 | DIASTOLIC BLOOD PRESSURE: 84 MMHG | HEART RATE: 79 BPM | HEIGHT: 73 IN

## 2025-04-07 DIAGNOSIS — K21.9 GASTROESOPHAGEAL REFLUX DISEASE WITHOUT ESOPHAGITIS: ICD-10-CM

## 2025-04-07 DIAGNOSIS — Z00.00 ENCOUNTER FOR WELL ADULT EXAM WITHOUT ABNORMAL FINDINGS: Primary | ICD-10-CM

## 2025-04-07 DIAGNOSIS — R53.82 CHRONIC FATIGUE: ICD-10-CM

## 2025-04-07 DIAGNOSIS — E66.09 CLASS 1 OBESITY DUE TO EXCESS CALORIES WITH SERIOUS COMORBIDITY AND BODY MASS INDEX (BMI) OF 30.0 TO 30.9 IN ADULT: ICD-10-CM

## 2025-04-07 DIAGNOSIS — R79.89 LOW TESTOSTERONE: ICD-10-CM

## 2025-04-07 DIAGNOSIS — E66.811 CLASS 1 OBESITY DUE TO EXCESS CALORIES WITH SERIOUS COMORBIDITY AND BODY MASS INDEX (BMI) OF 30.0 TO 30.9 IN ADULT: ICD-10-CM

## 2025-04-07 PROCEDURE — 3079F DIAST BP 80-89 MM HG: CPT | Performed by: NURSE PRACTITIONER

## 2025-04-07 PROCEDURE — 99214 OFFICE O/P EST MOD 30 MIN: CPT | Performed by: NURSE PRACTITIONER

## 2025-04-07 PROCEDURE — 3074F SYST BP LT 130 MM HG: CPT | Performed by: NURSE PRACTITIONER

## 2025-04-07 PROCEDURE — 99396 PREV VISIT EST AGE 40-64: CPT | Performed by: NURSE PRACTITIONER

## 2025-04-07 RX ORDER — ESOMEPRAZOLE MAGNESIUM 40 MG/1
40 CAPSULE, DELAYED RELEASE ORAL
Qty: 90 CAPSULE | Refills: 2 | Status: SHIPPED | OUTPATIENT
Start: 2025-04-07

## 2025-04-07 ASSESSMENT — ENCOUNTER SYMPTOMS
COUGH: 0
DIARRHEA: 0
BACK PAIN: 0
ABDOMINAL DISTENTION: 0
SHORTNESS OF BREATH: 0
SORE THROAT: 0
NAUSEA: 0
EYE REDNESS: 0
ABDOMINAL PAIN: 0
EYE DISCHARGE: 0
WHEEZING: 0
SINUS PRESSURE: 0
VOMITING: 0
EYE PAIN: 0
SINUS PAIN: 0

## 2025-04-07 NOTE — PROGRESS NOTES
Well Adult Note  Name: Jeovany Anthony Today’s Date: 2025   MRN: [id number] Sex: Male   Age: 59 y.o. Ethnicity: Non- / Non    : 1965 Race: Black /       Jeovany Anthony is here for a well adult exam.       Assessment & Plan   Encounter for well adult exam without abnormal findings  -Normal examination  -Fasting labs completed at last appointment  -Consider labs to further evaluate fatigue based upon response to testosterone as well as follow-up with rheumatology next month  -Increase Zepbound to 10 mg weekly  -Continue other medications as ordered  -Follow-up in 6 months, or sooner if needed  Chronic fatigue  -Uncontrolled.  Following with rheumatology.  Did see a difference with prednisone taper.  Could be indication for uncontrolled rheumatologic disease.  Has follow-up with them next month.  Based upon this follow-up, consider additional lab work.  Also plan on rechecking testosterone 2 months after treatment.  Follow-up in 6 months.  Class 1 obesity due to excess calories with serious comorbidity and body mass index (BMI) of 30.0 to 30.9 in adult  -Improved but still not at goal.  Increase Zepbound to 10 mg.  Follow-up in 6 months.  -     tirzepatide-weight management (ZEPBOUND) 10 MG/0.5ML SOAJ subCUTAneous auto-injector pen; Inject 10 mg into the skin every 7 days, Disp-2 mL, R-0Normal  Gastroesophageal reflux disease without esophagitis  -Controlled.  Continue Nexium.  Follow-up in 6 months.  -     esomeprazole (NEXIUM) 40 MG delayed release capsule; Take 1 capsule by mouth every morning (before breakfast), Disp-90 capsule, R-2Failed OTCNormal  Low testosterone  -Uncontrolled.  Patient just started testosterone.  Recheck level in 2 months.  Based upon response, may need additional adjustment.  Still having chronic fatigue.        Return in about 6 months (around 10/7/2025) for Follow-up Chronic Conditions.       Subjective   History:  Lyle presents today for

## 2025-04-07 NOTE — PATIENT INSTRUCTIONS
hands, brush your teeth twice a day, and wear a seat belt in the car.   Where can you learn more?  Go to [email or web address].net/patientEd and enter P072 to learn more about \"Well Visit, Ages 18 to 65: Care Instructions.\"  Current as of: April 30, 2024  Content Version: 14.4  © 9983-5906 [employer].   Care instructions adapted under license by [employer]. If you have questions about a medical condition or this instruction, always ask your healthcare professional. [employer] disclaims any warranty or liability for your use of this information.

## 2025-04-13 DIAGNOSIS — F51.04 PSYCHOPHYSIOLOGIC INSOMNIA: ICD-10-CM

## 2025-04-14 RX ORDER — MIRTAZAPINE 15 MG/1
TABLET, FILM COATED ORAL
Qty: 90 TABLET | Refills: 2 | Status: SHIPPED | OUTPATIENT
Start: 2025-04-14

## 2025-04-17 ENCOUNTER — PATIENT MESSAGE (OUTPATIENT)
Dept: FAMILY MEDICINE CLINIC | Age: 60
End: 2025-04-17

## 2025-04-23 ENCOUNTER — PATIENT MESSAGE (OUTPATIENT)
Dept: FAMILY MEDICINE CLINIC | Age: 60
End: 2025-04-23

## 2025-04-23 DIAGNOSIS — F51.04 PSYCHOPHYSIOLOGIC INSOMNIA: Primary | ICD-10-CM

## 2025-04-23 RX ORDER — ESZOPICLONE 1 MG/1
1-3 TABLET, FILM COATED ORAL NIGHTLY
Qty: 90 TABLET | Refills: 0 | Status: SHIPPED | OUTPATIENT
Start: 2025-04-23 | End: 2025-05-23

## 2025-04-25 ENCOUNTER — TELEPHONE (OUTPATIENT)
Dept: ADMINISTRATIVE | Age: 60
End: 2025-04-25

## 2025-04-25 NOTE — TELEPHONE ENCOUNTER
Submitted PA for Eszopiclone 1MG tablets   Via [facility] Key: [id number] STATUS: PENDING.    Follow up done daily; if no decision with in three days we will refax.  If another three days goes by with no decision will call the insurance for status.

## 2025-04-30 ENCOUNTER — PATIENT MESSAGE (OUTPATIENT)
Dept: FAMILY MEDICINE CLINIC | Age: 60
End: 2025-04-30

## 2025-05-01 DIAGNOSIS — E66.09 CLASS 1 OBESITY DUE TO EXCESS CALORIES WITH SERIOUS COMORBIDITY AND BODY MASS INDEX (BMI) OF 30.0 TO 30.9 IN ADULT: ICD-10-CM

## 2025-05-01 DIAGNOSIS — E66.811 CLASS 1 OBESITY DUE TO EXCESS CALORIES WITH SERIOUS COMORBIDITY AND BODY MASS INDEX (BMI) OF 30.0 TO 30.9 IN ADULT: ICD-10-CM

## 2025-05-01 RX ORDER — TIRZEPATIDE 10 MG/.5ML
INJECTION, SOLUTION SUBCUTANEOUS
Qty: 2 ML | Refills: 2 | Status: SHIPPED | OUTPATIENT
Start: 2025-05-01

## 2025-05-01 NOTE — TELEPHONE ENCOUNTER
The medication is APPROVED.THROUGH 04/30/2026    If this requires a response please respond to the pool ( P MHCX P[facility] MEDICATION PRE-AUTH).      Thank you please advise patient.

## 2025-05-05 ENCOUNTER — TELEPHONE (OUTPATIENT)
Dept: ADMINISTRATIVE | Age: 60
End: 2025-05-05

## 2025-05-05 NOTE — TELEPHONE ENCOUNTER
HAS THERE BEEN ANY UPDATE? 2ND TIME ROUTING MESSAGE LOOKS LIKE IT WAS OPENED BY PA DEPARTMENT 5/1 AT 12:32 AND CLOSED OUT?

## 2025-05-05 NOTE — TELEPHONE ENCOUNTER
I am processing PA for Trintellix (formerly Brintellix) 5MG tablets, Key: [id number] The plan is asking for chart notes. PLEASE NOTE: Was the required documentation provided? NOTE: Physical documentation must be provided.* I do not see this medication mentioned in any chart note. Sorry if I am missing it.    Please advise. Thank you

## 2025-05-05 NOTE — TELEPHONE ENCOUNTER
I CANNOT FIND ANYTHING IN AN OFFICE NOTE IT WAS BASED OF MEDS HE WAS ON NOT WORKING AND THEY DISCUSSED THIS VIA [email or web address]     Lyle,     We have multiple options that we could try.  One that you have not tried that might be worth considering is called duloxetine or Cymbalta.  This has the added benefit that it may also help with any nerve related pain.  Could come in handy with the rheumatologic issues.  If we want to focus more on the mood itself without any added benefit, I commonly use Wellbutrin, Effexor, Viibryd, and and Trintellix.  Wellbutrin or Effexor would have an extra bump in anxiety management.  Viibryd or Trintellix tend to help a little more with the motivational piece of mood issues.  If any of these specifically seem to appeal to you, let me know, and I can send to the pharmacy.     Ilan KONG    Last read by Jeovany Anthony \"Lyle\" at 1:35PM on 4/22/2025.

## 2025-05-06 NOTE — TELEPHONE ENCOUNTER
Submitted PA for Trintellix (formerly Brintellix) 5MG tablets   Via [facility] Key: [id number] STATUS: PENDING.    Follow up done daily; if no decision with in three days we will refax.  If another three days goes by with no decision will call the insurance for status.

## 2025-05-07 ENCOUNTER — OFFICE VISIT (OUTPATIENT)
Dept: ENT CLINIC | Age: 60
End: 2025-05-07
Payer: COMMERCIAL

## 2025-05-07 VITALS
BODY MASS INDEX: 31.94 KG/M2 | DIASTOLIC BLOOD PRESSURE: 80 MMHG | WEIGHT: 241 LBS | HEIGHT: 73 IN | SYSTOLIC BLOOD PRESSURE: 116 MMHG | HEART RATE: 89 BPM

## 2025-05-07 DIAGNOSIS — J34.89 NASAL SEPTAL PERFORATION: ICD-10-CM

## 2025-05-07 DIAGNOSIS — R49.0 DYSPHONIA: ICD-10-CM

## 2025-05-07 DIAGNOSIS — M54.2 NECK PAIN: Primary | ICD-10-CM

## 2025-05-07 PROCEDURE — 3079F DIAST BP 80-89 MM HG: CPT | Performed by: OTOLARYNGOLOGY

## 2025-05-07 PROCEDURE — 31575 DIAGNOSTIC LARYNGOSCOPY: CPT | Performed by: OTOLARYNGOLOGY

## 2025-05-07 PROCEDURE — 3074F SYST BP LT 130 MM HG: CPT | Performed by: OTOLARYNGOLOGY

## 2025-05-07 PROCEDURE — 99213 OFFICE O/P EST LOW 20 MIN: CPT | Performed by: OTOLARYNGOLOGY

## 2025-05-07 NOTE — PROGRESS NOTES
[facility]  DIVISION OF OTOLARYNGOLOGY- HEAD & NECK SURGERY  Follow up      Patient Name: Jeovany Anthony  Medical Record Number:  [id number]  Primary Care Physician:  Alcides Villegas APRN - CNP  Date of Consultation: 5/7/2025    Chief Complaint: Neck pain        Interval History    Patient is following up for some issues.  When I saw him in November he was having some ear pain and cervical lymphadenopathy that went down after taking antibiotics.  He said for the past month he had more left neck pain.  He does have intermittent fluctuation in his speech, but this has been long-term.  He has a history of rheumatoid arthritis as well.  He had a septoplasty in 2018.  He is not a smoker.  He takes reflux medication          REVIEW OF SYSTEMS  As above    PHYSICAL EXAM  GENERAL: No Acute Distress, Alert and Oriented, no Hoarseness, strong voice  EYES: EOMI, Anti-icteric  HENT:   Head: Normocephalic and atraumatic.   Face:  Symmetric, facial nerve intact, no sinus tenderness  Right Ear: Normal external ear, normal external auditory canal, intact tympanic membrane with normal mobility and aerated middle ear  Left Ear: Normal external ear, normal external auditory canal, intact tympanic membrane with normal mobility and aerated middle ear  Mouth/Oral Cavity:  normal lips, Uvula is midline, no mucosal lesions, no trismus  Oropharynx/Larynx:  normal oropharynx,  Nose:Normal external nasal appearance.  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  A flexible scope was passed in the nasal cavity.  Nasopharynx was normal.  Base of tongue vallecula is normal.  No supraglottic or glottic masses.  Normal vocal cord mobility        ASSESSMENT/PLAN  1. Neck pain  This is to be more musculoskeletal in nature.  Offered to have

## 2025-05-14 NOTE — TELEPHONE ENCOUNTER
Addressed in MyChart encounter.  Needs to try 1 more covered agent before Trintellix.  Sending Viibryd.

## 2025-05-15 ENCOUNTER — TELEPHONE (OUTPATIENT)
Dept: ADMINISTRATIVE | Age: 60
End: 2025-05-15

## 2025-05-15 NOTE — TELEPHONE ENCOUNTER
I am processing PA for Vilazodone HCl 20MG tablets, Key: [id number] Plan is asking - PLEASE NOTE: Was the required documentation provided? NOTE: Physical documentation must be provided.*  I do not see any chart notes regarding depression. Sorry if I am missing them.    Please advise. Thank you

## 2025-05-15 NOTE — TELEPHONE ENCOUNTER
Ilan they are looking for notes that discuss depression, I am looking back and I do not see anything that mentions depression in any notes. Are you able to see anything. I see notes for anxiety but that was back in September 2024. Sc

## 2025-05-16 NOTE — TELEPHONE ENCOUNTER
Submitted PA for Vilazodone HCl 20MG tablets   Via [facility] Key: [id number] STATUS: PENDING. Mandy has not replied to your PA request. Turnaround time for review of a PA request is dependent upon insurance plan and can range from 24 hours to 5 calendar days.     Follow up done daily; if no decision with in three days we will refax.  If another three days goes by with no decision will call the insurance for status.

## 2025-05-23 NOTE — TELEPHONE ENCOUNTER
The medication Vilazodone HCl 20MG tablets  is APPROVED from 05/22/2025 to 05/22/2026.    Please notify the patient.    If this requires a response please respond to the pool ( P MHCX PSC MEDICATION PRE-AUTH).

## 2025-05-27 ENCOUNTER — OFFICE VISIT (OUTPATIENT)
Age: 60
End: 2025-05-27

## 2025-05-27 VITALS
TEMPERATURE: 98 F | HEIGHT: 73 IN | BODY MASS INDEX: 30.88 KG/M2 | WEIGHT: 233 LBS | HEART RATE: 90 BPM | OXYGEN SATURATION: 97 % | SYSTOLIC BLOOD PRESSURE: 132 MMHG | DIASTOLIC BLOOD PRESSURE: 92 MMHG

## 2025-05-27 DIAGNOSIS — H92.03 OTALGIA OF BOTH EARS: Primary | ICD-10-CM

## 2025-05-27 NOTE — PROGRESS NOTES
Jeovany Anthony (:  1965) is a 59 y.o. male,New patient, here for evaluation of the following chief complaint(s):  Ear Pain (Bilateral ear pain started a week ago )      Assessment & Plan :    Patient was seen and evaluated today for bilateral ear pain.  On examination it was only noted to see some fluid behind the left TM.  Advised patient to alternate ibuprofen and Tylenol as needed for pain and fever.  Advised patient to start a antihistamine daily to see if we get that fluid to drain.  Return to office and ER precautions given.   Follow up in 5-7 days if symptoms persist or if symptoms worsen.       Subjective :  HPI  HPI:   59 y.o. male presents with symptoms of having bilateral ear pain.  He states the right 1 feels like there is a crunching sound and it but that was much better today.  He states his left ear has pain that radiates down to his lymph nodes.  He states this has been going on for the last 10 days.  He denies any other symptoms related to his head.  He has not taken any medications to help with symptoms.  He denies fever, chills, weakness, dizziness, headaches, chest pain, shortness of breath or abdominal pain.      Vitals:    25 1719   BP: (!) 141/85   BP Site: Left Upper Arm   Patient Position: Sitting   BP Cuff Size: Large Adult   Pulse: 90   Temp: 98 °F (36.7 °C)   TempSrc: Oral   SpO2: 97%   Weight: 105.7 kg (233 lb)   Height: 1.854 m (6' 1\")          Objective   Physical Exam  Constitutional:       Appearance: Normal appearance.   HENT:      Head: Normocephalic.      Right Ear: Tympanic membrane, ear canal and external ear normal.      Ears:      Comments: Left ear to have some fluid behind TM  Eyes:      Extraocular Movements: Extraocular movements intact.   Cardiovascular:      Rate and Rhythm: Normal rate and regular rhythm.   Pulmonary:      Effort: Pulmonary effort is normal.      Breath sounds: Normal breath sounds.   Skin:     General: Skin is warm and dry.

## 2025-06-04 ENCOUNTER — PATIENT MESSAGE (OUTPATIENT)
Dept: FAMILY MEDICINE CLINIC | Age: 60
End: 2025-06-04

## 2025-06-04 DIAGNOSIS — H69.93 EUSTACHIAN TUBE DYSFUNCTION, BILATERAL: ICD-10-CM

## 2025-06-05 RX ORDER — IPRATROPIUM BROMIDE 42 UG/1
2 SPRAY, METERED NASAL 4 TIMES DAILY
Qty: 15 ML | Refills: 3 | Status: SHIPPED | OUTPATIENT
Start: 2025-06-05

## 2025-06-05 RX ORDER — METHYLPREDNISOLONE 4 MG/1
TABLET ORAL
Qty: 1 KIT | Refills: 0 | Status: SHIPPED | OUTPATIENT
Start: 2025-06-05 | End: 2025-06-11

## 2025-06-10 ENCOUNTER — PATIENT MESSAGE (OUTPATIENT)
Dept: FAMILY MEDICINE CLINIC | Age: 60
End: 2025-06-10

## 2025-06-10 DIAGNOSIS — B02.29 POST HERPETIC NEURALGIA: Primary | ICD-10-CM

## 2025-06-10 DIAGNOSIS — G50.0 TRIGEMINAL NEURALGIA: ICD-10-CM

## 2025-06-11 RX ORDER — LIDOCAINE 50 MG/G
1 PATCH TOPICAL DAILY
Qty: 10 PATCH | Refills: 0 | Status: SHIPPED | OUTPATIENT
Start: 2025-06-11 | End: 2025-06-21

## 2025-06-12 ENCOUNTER — TELEPHONE (OUTPATIENT)
Dept: ADMINISTRATIVE | Age: 60
End: 2025-06-12

## 2025-06-12 NOTE — TELEPHONE ENCOUNTER
Submitted PA for Lidocaine 5% patches   Via [facility] Key: [id number]  STATUS: PA Case: [id number], Status: Approved, Coverage Starts on: 6/12/2025 12:00:00 AM, Coverage Ends on: 6/12/2026 12:00:00 AM.  Effective Date: 6/12/2025  Authorization Expiration Date: 6/12/2026    Please notify patient. Thank you.

## 2025-06-20 ENCOUNTER — PATIENT MESSAGE (OUTPATIENT)
Dept: FAMILY MEDICINE CLINIC | Age: 60
End: 2025-06-20

## 2025-06-20 DIAGNOSIS — F51.04 PSYCHOPHYSIOLOGIC INSOMNIA: ICD-10-CM

## 2025-06-20 RX ORDER — ESZOPICLONE 1 MG/1
1-3 TABLET, FILM COATED ORAL NIGHTLY
Qty: 90 TABLET | Refills: 0 | Status: SHIPPED | OUTPATIENT
Start: 2025-06-20 | End: 2025-07-20

## 2025-06-24 ENCOUNTER — TELEPHONE (OUTPATIENT)
Dept: ADMINISTRATIVE | Age: 60
End: 2025-06-24

## 2025-06-24 NOTE — TELEPHONE ENCOUNTER
Submitted PA for Zepbound 10MG/0.5ML pen-injectors  Via [facility] Key: [id number] STATUS: PENDING.    Follow up done daily; if no decision with in three days we will refax.  If another three days goes by with no decision will call the insurance for status.

## 2025-06-30 NOTE — TELEPHONE ENCOUNTER
Just verify that sleep apnea is not a covered indication for this patient as it is now an indication for Zepbound.  If it is not, we will just have to let him know that they are only covering for cardiac reasons and not sleep apnea.

## 2025-07-01 NOTE — TELEPHONE ENCOUNTER
SPOKE TO PT HE STATES HE JUST RETIRED AND THERE WAS A LAPSE IN INSURANCE HE SAID HE CALLED [employer] WEEKS AGO THAT THERE SHOULD NOT HAVE BEEN A LAPSE SO HE IS WAITING TO SEE WHAT HAPPENED. THEY TOLD HIM IT SHOULD TAKE ONE TO 2 WEEKS TO HAVE INSURANCE AGAIN HE WILL FIND OUT IN A FEW DAYS I WILL SEND A MESSAGE BACK ONCE HE CALLS

## 2025-07-01 NOTE — TELEPHONE ENCOUNTER
SPOKE TO PT INFORMED OF MEDICATION DENIAL SENDING BACK TO PA TO MAKE SURE THAT SLEEP APNEA IS NOT A COVERED INDICATION FOR ZEPBOUND?

## 2025-07-01 NOTE — TELEPHONE ENCOUNTER
I called [facility] and I spoke to Janine GARRIDO. Ref# [id number] and she states that the patient's RX coverage termed as of today 07/01/2025 and that he needs to call member services on the back of his card. It's [phone]. She said he needs to call them and coordinate/update vendor services. They will know what this means. I asked if it was something I could just do and they said know that it has to be the patient. Once that is done I can find out about submitting  Zepbound 10MG/0.5ML pen-injectors with DX of MONISHA.    Please notify patient. Thank you.

## 2025-07-10 DIAGNOSIS — E66.09 CLASS 1 OBESITY DUE TO EXCESS CALORIES WITH SERIOUS COMORBIDITY AND BODY MASS INDEX (BMI) OF 30.0 TO 30.9 IN ADULT: ICD-10-CM

## 2025-07-10 DIAGNOSIS — E66.811 CLASS 1 OBESITY DUE TO EXCESS CALORIES WITH SERIOUS COMORBIDITY AND BODY MASS INDEX (BMI) OF 30.0 TO 30.9 IN ADULT: ICD-10-CM

## 2025-07-10 DIAGNOSIS — G47.33 OSA (OBSTRUCTIVE SLEEP APNEA): Primary | ICD-10-CM

## 2025-07-10 RX ORDER — TIRZEPATIDE 10 MG/.5ML
10 INJECTION, SOLUTION SUBCUTANEOUS WEEKLY
Qty: 2 ML | Refills: 2 | Status: SHIPPED | OUTPATIENT
Start: 2025-07-10

## 2025-07-22 ENCOUNTER — LAB (OUTPATIENT)
Dept: FAMILY MEDICINE CLINIC | Age: 60
End: 2025-07-22

## 2025-07-22 VITALS — SYSTOLIC BLOOD PRESSURE: 130 MMHG | DIASTOLIC BLOOD PRESSURE: 76 MMHG

## 2025-07-22 DIAGNOSIS — Z01.30 BLOOD PRESSURE CHECK: Primary | ICD-10-CM

## 2025-07-28 ENCOUNTER — PATIENT MESSAGE (OUTPATIENT)
Dept: FAMILY MEDICINE CLINIC | Age: 60
End: 2025-07-28

## 2025-07-28 DIAGNOSIS — F32.A EPISODE OF DEPRESSION: ICD-10-CM

## 2025-07-28 RX ORDER — VILAZODONE HYDROCHLORIDE 40 MG/1
40 TABLET ORAL DAILY
Qty: 90 TABLET | Refills: 1 | Status: SHIPPED | OUTPATIENT
Start: 2025-07-28

## 2025-08-11 DIAGNOSIS — F51.04 PSYCHOPHYSIOLOGIC INSOMNIA: Primary | ICD-10-CM

## 2025-08-11 RX ORDER — ESZOPICLONE 2 MG/1
2 TABLET, FILM COATED ORAL NIGHTLY
Qty: 30 TABLET | Refills: 1 | Status: SHIPPED | OUTPATIENT
Start: 2025-08-11 | End: 2025-10-09

## (undated) DEVICE — Z DISCONTINUED BY MEDLINE USE 2711682 TRAY SKIN PREP DRY W/ PREM GLV

## (undated) DEVICE — GLOVE ORANGE PI 7   MSG9070

## (undated) DEVICE — SUTURE MCRYL SZ 4-0 L18IN ABSRB UD L19MM PS-2 3/8 CIR PRIM Y496G

## (undated) DEVICE — GLOVE ORANGE PI 8   MSG9080

## (undated) DEVICE — COVER,MAYO STAND,XL,STERILE: Brand: MEDLINE

## (undated) DEVICE — CLEANER,CAUTERY TIP,2X2",STERILE: Brand: MEDLINE

## (undated) DEVICE — GOWN,SIRUS,POLYRNF,BRTHSLV,XLN/XXL,18/CS: Brand: MEDLINE

## (undated) DEVICE — Z CONVERTED USE 2273232 BANDAGE COMPR W6INXL11YD E KNIT DBL SELF CLSR EZE-BAND

## (undated) DEVICE — 3M™ TEGADERM™ TRANSPARENT FILM DRESSING FRAME STYLE, 1626W, 4 IN X 4-3/4 IN (10 CM X 12 CM), 50/CT 4CT/CASE: Brand: 3M™ TEGADERM™

## (undated) DEVICE — BLADE SHV L13CM DIA4MM CVD EXCALIBUR AGG COOLCUT

## (undated) DEVICE — SOLUTION IV IRRIG LACTATED RINGERS 3000ML 2B7487

## (undated) DEVICE — PENCIL ES L3M BTTN SWCH S STL HEX LOK BLDE ELECTRD HOLSTER

## (undated) DEVICE — GLOVE SURG SZ 75 L12IN FNGR THK79MIL GRN LTX FREE

## (undated) DEVICE — PADDING CAST W6INXL4YD ST COT RAYON MICROPLEATED HIGHLY

## (undated) DEVICE — SHEET, T, LAPAROTOMY, STERILE: Brand: MEDLINE

## (undated) DEVICE — STRIP,CLOSURE,WOUND,MEDI-STRIP,1/2X4: Brand: MEDLINE

## (undated) DEVICE — TUBING PMP L16FT MAIN DISP FOR AR-6400 AR-6475

## (undated) DEVICE — BLADE SHV L13CM DIA4MM EXCALIBUR AGG COOLCUT

## (undated) DEVICE — TUBING PMP L6FT CONT WAVE EXTN

## (undated) DEVICE — MAT FLR W32XL58IN

## (undated) DEVICE — MAJOR SET UP: Brand: MEDLINE INDUSTRIES, INC.

## (undated) DEVICE — GARMENT,MEDLINE,DVT,INT,CALF,MED, GEN2: Brand: MEDLINE

## (undated) DEVICE — GAUZE,SPONGE,2"X2",8PLY,STERILE,LF,2'S: Brand: MEDLINE

## (undated) DEVICE — 3M™ TEGADERM™ TRANSPARENT FILM DRESSING FRAME STYLE, 1624W, 2-3/8 IN X 2-3/4 IN (6 CM X 7 CM), 100/CT 4CT/CASE: Brand: 3M™ TEGADERM™

## (undated) DEVICE — Z DISCONTINUED USE 2272117 DRAPE SURG 3 QTR N INVASIVE 2 LAYR DISP

## (undated) DEVICE — GLOVE SURG SZ 7 CRM LTX FREE POLYISOPRENE POLYMER BEAD ANTI

## (undated) DEVICE — ZIMMER® STERILE DISPOSABLE TOURNIQUET CUFF WITH PLC, DUAL PORT, SINGLE BLADDER, 30 IN. (76 CM)

## (undated) DEVICE — TOWEL,STOP FLAG GOLD N-W: Brand: MEDLINE

## (undated) DEVICE — 3M™ STERI-STRIP™ COMPOUND BENZOIN TINCTURE 40 BAGS/CARTON 4 CARTONS/CASE C1544: Brand: 3M™ STERI-STRIP™

## (undated) DEVICE — KNEE ARTHROSCOPY: Brand: MEDLINE INDUSTRIES, INC.

## (undated) DEVICE — SOLUTION IV IRRIG POUR BRL 0.9% SODIUM CHL 2F7124

## (undated) DEVICE — COUNTER NDL 100 COUNT FOAM BLK DBL MAG ADH TAB W BLDE REMV

## (undated) DEVICE — SUTURE VCRL + SZ 3-0 L27IN ABSRB UD L26MM SH 1/2 CIR VCP416H

## (undated) DEVICE — GLOVE SURG SZ 8 L12IN FNGR THK79MIL GRN LTX FREE